# Patient Record
Sex: FEMALE | Race: WHITE | Employment: OTHER | URBAN - METROPOLITAN AREA
[De-identification: names, ages, dates, MRNs, and addresses within clinical notes are randomized per-mention and may not be internally consistent; named-entity substitution may affect disease eponyms.]

---

## 2020-06-23 ENCOUNTER — IP HISTORICAL/CONVERTED ENCOUNTER (OUTPATIENT)
Dept: OTHER | Age: 80
End: 2020-06-23

## 2022-01-29 ENCOUNTER — APPOINTMENT (OUTPATIENT)
Dept: CT IMAGING | Age: 82
DRG: 175 | End: 2022-01-29
Attending: STUDENT IN AN ORGANIZED HEALTH CARE EDUCATION/TRAINING PROGRAM
Payer: MEDICARE

## 2022-01-29 ENCOUNTER — APPOINTMENT (OUTPATIENT)
Dept: NON INVASIVE DIAGNOSTICS | Age: 82
DRG: 175 | End: 2022-01-29
Attending: STUDENT IN AN ORGANIZED HEALTH CARE EDUCATION/TRAINING PROGRAM
Payer: MEDICARE

## 2022-01-29 ENCOUNTER — HOSPITAL ENCOUNTER (INPATIENT)
Age: 82
LOS: 12 days | Discharge: HOME | DRG: 175 | End: 2022-02-10
Attending: STUDENT IN AN ORGANIZED HEALTH CARE EDUCATION/TRAINING PROGRAM | Admitting: INTERNAL MEDICINE
Payer: MEDICARE

## 2022-01-29 ENCOUNTER — APPOINTMENT (OUTPATIENT)
Dept: GENERAL RADIOLOGY | Age: 82
DRG: 175 | End: 2022-01-29
Attending: STUDENT IN AN ORGANIZED HEALTH CARE EDUCATION/TRAINING PROGRAM
Payer: MEDICARE

## 2022-01-29 DIAGNOSIS — R09.02 HYPOXIA: ICD-10-CM

## 2022-01-29 DIAGNOSIS — I26.99 ACUTE PULMONARY EMBOLISM, UNSPECIFIED PULMONARY EMBOLISM TYPE, UNSPECIFIED WHETHER ACUTE COR PULMONALE PRESENT (HCC): Primary | ICD-10-CM

## 2022-01-29 PROBLEM — D64.9 ANEMIA: Status: ACTIVE | Noted: 2022-01-29

## 2022-01-29 PROBLEM — U07.1 COVID-19: Chronic | Status: ACTIVE | Noted: 2022-01-29

## 2022-01-29 PROBLEM — J96.01 ACUTE RESPIRATORY FAILURE WITH HYPOXIA (HCC): Status: ACTIVE | Noted: 2022-01-29

## 2022-01-29 PROBLEM — I48.91 ATRIAL FIBRILLATION (HCC): Status: ACTIVE | Noted: 2022-01-29

## 2022-01-29 PROBLEM — J90 PLEURAL EFFUSION: Status: ACTIVE | Noted: 2022-01-29

## 2022-01-29 PROBLEM — J85.2 LUNG ABSCESS (HCC): Status: ACTIVE | Noted: 2022-01-29

## 2022-01-29 PROBLEM — Q44.1 GALLBLADDER ANOMALY: Status: ACTIVE | Noted: 2022-01-29

## 2022-01-29 PROBLEM — K59.00 CONSTIPATION: Status: ACTIVE | Noted: 2022-01-29

## 2022-01-29 PROBLEM — K25.7 CHRONIC GASTRIC ULCER: Chronic | Status: ACTIVE | Noted: 2022-01-29

## 2022-01-29 PROBLEM — J90 BILATERAL PLEURAL EFFUSION: Status: ACTIVE | Noted: 2022-01-29

## 2022-01-29 PROBLEM — K92.2 GIB (GASTROINTESTINAL BLEEDING): Chronic | Status: ACTIVE | Noted: 2022-01-29

## 2022-01-29 PROBLEM — K44.9 HIATAL HERNIA: Status: ACTIVE | Noted: 2022-01-29

## 2022-01-29 PROBLEM — E87.6 HYPOKALEMIA: Status: ACTIVE | Noted: 2022-01-29

## 2022-01-29 PROBLEM — I95.9 HYPOTENSION: Status: ACTIVE | Noted: 2022-01-29

## 2022-01-29 PROBLEM — I82.409 DEEP VEIN THROMBOSIS (DVT) OF LOWER EXTREMITY (HCC): Status: ACTIVE | Noted: 2022-01-29

## 2022-01-29 LAB
ALBUMIN SERPL-MCNC: 1.6 G/DL (ref 3.5–5)
ALBUMIN/GLOB SERPL: 0.5 {RATIO} (ref 1.1–2.2)
ALP SERPL-CCNC: 105 U/L (ref 45–117)
ALT SERPL-CCNC: 54 U/L (ref 12–78)
ANION GAP SERPL CALC-SCNC: 2 MMOL/L (ref 5–15)
APTT PPP: 32 SEC (ref 21.2–34.1)
AST SERPL W P-5'-P-CCNC: 60 U/L (ref 15–37)
BASOPHILS # BLD: 0 K/UL (ref 0–0.1)
BASOPHILS NFR BLD: 0 % (ref 0–1)
BILIRUB SERPL-MCNC: 1.6 MG/DL (ref 0.2–1)
BNP SERPL-MCNC: 2570 PG/ML
BUN SERPL-MCNC: 26 MG/DL (ref 6–20)
BUN/CREAT SERPL: 49 (ref 12–20)
CA-I BLD-MCNC: 7.6 MG/DL (ref 8.5–10.1)
CHLORIDE SERPL-SCNC: 102 MMOL/L (ref 97–108)
CO2 SERPL-SCNC: 36 MMOL/L (ref 21–32)
COVID-19 RAPID TEST, COVR: NOT DETECTED
CREAT SERPL-MCNC: 0.53 MG/DL (ref 0.55–1.02)
D DIMER PPP FEU-MCNC: 1.91 UG/ML(FEU)
DIFFERENTIAL METHOD BLD: ABNORMAL
ECHO AO ROOT DIAM: 2.1 CM
ECHO AO ROOT INDEX: 1.44 CM/M2
ECHO AV PEAK GRADIENT: 3 MMHG
ECHO AV PEAK VELOCITY: 0.9 M/S
ECHO EST RA PRESSURE: 15 MMHG
ECHO LA DIAMETER INDEX: 2.19 CM/M2
ECHO LA DIAMETER: 3.2 CM
ECHO LA TO AORTIC ROOT RATIO: 1.52
ECHO LV FRACTIONAL SHORTENING: 31 % (ref 28–44)
ECHO LV INTERNAL DIMENSION DIASTOLE INDEX: 2.19 CM/M2
ECHO LV INTERNAL DIMENSION DIASTOLIC: 3.2 CM (ref 3.9–5.3)
ECHO LV INTERNAL DIMENSION SYSTOLIC INDEX: 1.51 CM/M2
ECHO LV INTERNAL DIMENSION SYSTOLIC: 2.2 CM
ECHO LV IVSD: 1.3 CM (ref 0.6–0.9)
ECHO LV MASS 2D: 135.7 G (ref 67–162)
ECHO LV MASS INDEX 2D: 92.9 G/M2 (ref 43–95)
ECHO LV POSTERIOR WALL DIASTOLIC: 1.3 CM (ref 0.6–0.9)
ECHO LV RELATIVE WALL THICKNESS RATIO: 0.81
ECHO MV A VELOCITY: 0.56 M/S
ECHO MV E DECELERATION TIME (DT): 102 MS
ECHO MV E VELOCITY: 0.72 M/S
ECHO MV E/A RATIO: 1.29
ECHO PULMONARY ARTERY SYSTOLIC PRESSURE (PASP): 33 MMHG
ECHO PV MAX VELOCITY: 0.8 M/S
ECHO PV PEAK GRADIENT: 3 MMHG
ECHO RIGHT VENTRICULAR SYSTOLIC PRESSURE (RVSP): 33 MMHG
ECHO RV INTERNAL DIMENSION: 3.1 CM
ECHO TV REGURGITANT MAX VELOCITY: 2.13 M/S
ECHO TV REGURGITANT PEAK GRADIENT: 18 MMHG
EOSINOPHIL # BLD: 0.4 K/UL (ref 0–0.4)
EOSINOPHIL NFR BLD: 9 % (ref 0–7)
ERYTHROCYTE [DISTWIDTH] IN BLOOD BY AUTOMATED COUNT: 19.6 % (ref 11.5–14.5)
GLOBULIN SER CALC-MCNC: 3 G/DL (ref 2–4)
GLUCOSE SERPL-MCNC: 110 MG/DL (ref 65–100)
HCT VFR BLD AUTO: 28.3 % (ref 35–47)
HGB BLD-MCNC: 8.9 G/DL (ref 11.5–16)
IMM GRANULOCYTES # BLD AUTO: 0 K/UL (ref 0–0.04)
IMM GRANULOCYTES NFR BLD AUTO: 1 % (ref 0–0.5)
INR PPP: 1.5 (ref 0.9–1.1)
LACTATE SERPL-SCNC: 1.1 MMOL/L (ref 0.4–2)
LIPASE SERPL-CCNC: 59 U/L (ref 73–393)
LYMPHOCYTES # BLD: 0.7 K/UL (ref 0.8–3.5)
LYMPHOCYTES NFR BLD: 17 % (ref 12–49)
MCH RBC QN AUTO: 31 PG (ref 26–34)
MCHC RBC AUTO-ENTMCNC: 31.4 G/DL (ref 30–36.5)
MCV RBC AUTO: 98.6 FL (ref 80–99)
MONOCYTES # BLD: 0.4 K/UL (ref 0–1)
MONOCYTES NFR BLD: 10 % (ref 5–13)
NEUTS SEG # BLD: 2.5 K/UL (ref 1.8–8)
NEUTS SEG NFR BLD: 63 % (ref 32–75)
NRBC # BLD: 0 K/UL (ref 0–0.01)
NRBC BLD-RTO: 0 PER 100 WBC
PLATELET # BLD AUTO: 161 K/UL (ref 150–400)
PMV BLD AUTO: 10.9 FL (ref 8.9–12.9)
POTASSIUM SERPL-SCNC: 3 MMOL/L (ref 3.5–5.1)
PROT SERPL-MCNC: 4.6 G/DL (ref 6.4–8.2)
PROTHROMBIN TIME: 17.8 SEC (ref 11.9–14.7)
RBC # BLD AUTO: 2.87 M/UL (ref 3.8–5.2)
SODIUM SERPL-SCNC: 140 MMOL/L (ref 136–145)
SPECIMEN SOURCE: NORMAL
THERAPEUTIC RANGE,PTTT: NORMAL SEC (ref 82–109)
TROPONIN-HIGH SENSITIVITY: 18 NG/L (ref 0–51)
WBC # BLD AUTO: 4 K/UL (ref 3.6–11)

## 2022-01-29 PROCEDURE — 83605 ASSAY OF LACTIC ACID: CPT

## 2022-01-29 PROCEDURE — 99285 EMERGENCY DEPT VISIT HI MDM: CPT

## 2022-01-29 PROCEDURE — 74011250637 HC RX REV CODE- 250/637: Performed by: STUDENT IN AN ORGANIZED HEALTH CARE EDUCATION/TRAINING PROGRAM

## 2022-01-29 PROCEDURE — 85730 THROMBOPLASTIN TIME PARTIAL: CPT

## 2022-01-29 PROCEDURE — 74177 CT ABD & PELVIS W/CONTRAST: CPT

## 2022-01-29 PROCEDURE — 83690 ASSAY OF LIPASE: CPT

## 2022-01-29 PROCEDURE — 74011250636 HC RX REV CODE- 250/636: Performed by: INTERNAL MEDICINE

## 2022-01-29 PROCEDURE — 71045 X-RAY EXAM CHEST 1 VIEW: CPT

## 2022-01-29 PROCEDURE — 36415 COLL VENOUS BLD VENIPUNCTURE: CPT

## 2022-01-29 PROCEDURE — 85610 PROTHROMBIN TIME: CPT

## 2022-01-29 PROCEDURE — 83880 ASSAY OF NATRIURETIC PEPTIDE: CPT

## 2022-01-29 PROCEDURE — 93005 ELECTROCARDIOGRAM TRACING: CPT

## 2022-01-29 PROCEDURE — 87040 BLOOD CULTURE FOR BACTERIA: CPT

## 2022-01-29 PROCEDURE — 87635 SARS-COV-2 COVID-19 AMP PRB: CPT

## 2022-01-29 PROCEDURE — 84484 ASSAY OF TROPONIN QUANT: CPT

## 2022-01-29 PROCEDURE — 85025 COMPLETE CBC W/AUTO DIFF WBC: CPT

## 2022-01-29 PROCEDURE — 65270000029 HC RM PRIVATE

## 2022-01-29 PROCEDURE — 74011000636 HC RX REV CODE- 636: Performed by: STUDENT IN AN ORGANIZED HEALTH CARE EDUCATION/TRAINING PROGRAM

## 2022-01-29 PROCEDURE — 96361 HYDRATE IV INFUSION ADD-ON: CPT

## 2022-01-29 PROCEDURE — 74011250636 HC RX REV CODE- 250/636: Performed by: STUDENT IN AN ORGANIZED HEALTH CARE EDUCATION/TRAINING PROGRAM

## 2022-01-29 PROCEDURE — 71275 CT ANGIOGRAPHY CHEST: CPT

## 2022-01-29 PROCEDURE — 96375 TX/PRO/DX INJ NEW DRUG ADDON: CPT

## 2022-01-29 PROCEDURE — 93306 TTE W/DOPPLER COMPLETE: CPT

## 2022-01-29 PROCEDURE — 85379 FIBRIN DEGRADATION QUANT: CPT

## 2022-01-29 PROCEDURE — 80053 COMPREHEN METABOLIC PANEL: CPT

## 2022-01-29 PROCEDURE — 96374 THER/PROPH/DIAG INJ IV PUSH: CPT

## 2022-01-29 PROCEDURE — 74011000250 HC RX REV CODE- 250: Performed by: INTERNAL MEDICINE

## 2022-01-29 PROCEDURE — 74011250637 HC RX REV CODE- 250/637: Performed by: INTERNAL MEDICINE

## 2022-01-29 PROCEDURE — P9045 ALBUMIN (HUMAN), 5%, 250 ML: HCPCS | Performed by: INTERNAL MEDICINE

## 2022-01-29 RX ORDER — SODIUM CHLORIDE 0.9 % (FLUSH) 0.9 %
5-40 SYRINGE (ML) INJECTION AS NEEDED
Status: DISCONTINUED | OUTPATIENT
Start: 2022-01-29 | End: 2022-02-10 | Stop reason: HOSPADM

## 2022-01-29 RX ORDER — POLYETHYLENE GLYCOL 3350 17 G/17G
17 POWDER, FOR SOLUTION ORAL DAILY PRN
Status: DISCONTINUED | OUTPATIENT
Start: 2022-01-29 | End: 2022-02-10 | Stop reason: HOSPADM

## 2022-01-29 RX ORDER — HEPARIN SODIUM 1000 [USP'U]/ML
80 INJECTION, SOLUTION INTRAVENOUS; SUBCUTANEOUS AS NEEDED
Status: DISCONTINUED | OUTPATIENT
Start: 2022-01-29 | End: 2022-01-29

## 2022-01-29 RX ORDER — POLYETHYLENE GLYCOL 3350 17 G/17G
17 POWDER, FOR SOLUTION ORAL DAILY
Status: DISCONTINUED | OUTPATIENT
Start: 2022-01-29 | End: 2022-02-10 | Stop reason: HOSPADM

## 2022-01-29 RX ORDER — ACETAMINOPHEN 325 MG/1
650 TABLET ORAL
Status: DISCONTINUED | OUTPATIENT
Start: 2022-01-29 | End: 2022-02-10 | Stop reason: HOSPADM

## 2022-01-29 RX ORDER — SODIUM CHLORIDE 0.9 % (FLUSH) 0.9 %
5-40 SYRINGE (ML) INJECTION EVERY 8 HOURS
Status: DISCONTINUED | OUTPATIENT
Start: 2022-01-29 | End: 2022-02-10 | Stop reason: HOSPADM

## 2022-01-29 RX ORDER — SODIUM CHLORIDE 9 MG/ML
250 INJECTION, SOLUTION INTRAVENOUS ONCE
Status: COMPLETED | OUTPATIENT
Start: 2022-01-29 | End: 2022-01-29

## 2022-01-29 RX ORDER — HEPARIN SODIUM 1000 [USP'U]/ML
40 INJECTION, SOLUTION INTRAVENOUS; SUBCUTANEOUS AS NEEDED
Status: DISCONTINUED | OUTPATIENT
Start: 2022-01-29 | End: 2022-01-29

## 2022-01-29 RX ORDER — ACETAMINOPHEN 650 MG/1
650 SUPPOSITORY RECTAL
Status: DISCONTINUED | OUTPATIENT
Start: 2022-01-29 | End: 2022-02-10 | Stop reason: HOSPADM

## 2022-01-29 RX ORDER — AMIODARONE HYDROCHLORIDE 200 MG/1
200 TABLET ORAL DAILY
Status: DISCONTINUED | OUTPATIENT
Start: 2022-01-30 | End: 2022-02-10 | Stop reason: HOSPADM

## 2022-01-29 RX ORDER — POTASSIUM CHLORIDE 7.45 MG/ML
10 INJECTION INTRAVENOUS
Status: COMPLETED | OUTPATIENT
Start: 2022-01-29 | End: 2022-01-29

## 2022-01-29 RX ORDER — FUROSEMIDE 10 MG/ML
20 INJECTION INTRAMUSCULAR; INTRAVENOUS ONCE
Status: ACTIVE | OUTPATIENT
Start: 2022-01-29 | End: 2022-01-30

## 2022-01-29 RX ORDER — ONDANSETRON 2 MG/ML
4 INJECTION INTRAMUSCULAR; INTRAVENOUS
Status: DISCONTINUED | OUTPATIENT
Start: 2022-01-29 | End: 2022-02-10 | Stop reason: HOSPADM

## 2022-01-29 RX ORDER — HEPARIN SODIUM 1000 [USP'U]/ML
80 INJECTION, SOLUTION INTRAVENOUS; SUBCUTANEOUS ONCE
Status: DISCONTINUED | OUTPATIENT
Start: 2022-01-29 | End: 2022-01-29

## 2022-01-29 RX ORDER — POTASSIUM CHLORIDE 1.5 G/1.77G
40 POWDER, FOR SOLUTION ORAL
Status: COMPLETED | OUTPATIENT
Start: 2022-01-29 | End: 2022-01-29

## 2022-01-29 RX ORDER — MIDODRINE HYDROCHLORIDE 5 MG/1
5 TABLET ORAL 2 TIMES DAILY WITH MEALS
Status: DISCONTINUED | OUTPATIENT
Start: 2022-01-29 | End: 2022-02-08

## 2022-01-29 RX ORDER — ONDANSETRON 4 MG/1
4 TABLET, ORALLY DISINTEGRATING ORAL
Status: DISCONTINUED | OUTPATIENT
Start: 2022-01-29 | End: 2022-02-10 | Stop reason: HOSPADM

## 2022-01-29 RX ORDER — ALBUMIN HUMAN 50 G/1000ML
25 SOLUTION INTRAVENOUS ONCE
Status: COMPLETED | OUTPATIENT
Start: 2022-01-29 | End: 2022-01-30

## 2022-01-29 RX ORDER — PANTOPRAZOLE SODIUM 40 MG/1
40 TABLET, DELAYED RELEASE ORAL 2 TIMES DAILY
Status: DISCONTINUED | OUTPATIENT
Start: 2022-01-29 | End: 2022-02-10 | Stop reason: HOSPADM

## 2022-01-29 RX ADMIN — PANTOPRAZOLE SODIUM 40 MG: 40 TABLET, DELAYED RELEASE ORAL at 20:37

## 2022-01-29 RX ADMIN — SODIUM CHLORIDE, PRESERVATIVE FREE 10 ML: 5 INJECTION INTRAVENOUS at 22:11

## 2022-01-29 RX ADMIN — MIDODRINE HYDROCHLORIDE 5 MG: 5 TABLET ORAL at 21:07

## 2022-01-29 RX ADMIN — IOPAMIDOL 100 ML: 755 INJECTION, SOLUTION INTRAVENOUS at 14:00

## 2022-01-29 RX ADMIN — SODIUM CHLORIDE 250 ML: 9 INJECTION, SOLUTION INTRAVENOUS at 13:11

## 2022-01-29 RX ADMIN — POLYETHYLENE GLYCOL 3350 17 G: 17 POWDER, FOR SOLUTION ORAL at 16:56

## 2022-01-29 RX ADMIN — POTASSIUM CHLORIDE 10 MEQ: 7.46 INJECTION, SOLUTION INTRAVENOUS at 14:37

## 2022-01-29 RX ADMIN — SODIUM CHLORIDE, PRESERVATIVE FREE 1 G: 5 INJECTION INTRAVENOUS at 17:15

## 2022-01-29 RX ADMIN — LACTULOSE 15 ML: 20 SOLUTION ORAL at 17:17

## 2022-01-29 RX ADMIN — APIXABAN 5 MG: 5 TABLET, FILM COATED ORAL at 20:37

## 2022-01-29 RX ADMIN — SODIUM CHLORIDE 500 ML: 9 INJECTION, SOLUTION INTRAVENOUS at 22:26

## 2022-01-29 RX ADMIN — ALBUMIN (HUMAN) 25 G: 12.5 INJECTION, SOLUTION INTRAVENOUS at 20:35

## 2022-01-29 RX ADMIN — POTASSIUM CHLORIDE 40 MEQ: 1.5 POWDER, FOR SOLUTION ORAL at 20:37

## 2022-01-29 RX ADMIN — POTASSIUM CHLORIDE 10 MEQ: 7.46 INJECTION, SOLUTION INTRAVENOUS at 14:31

## 2022-01-29 NOTE — ED TRIAGE NOTES
Pt from home called EMS for abd pain x5 days, EMS arrives to pt with sat of 88% on RA, with no improvement in saturation per EMS on NC or NRB.  Arrives on CPAP in NAD

## 2022-01-29 NOTE — H&P
History and Physical    Patient: Sanam Miranda MRN: [id number]  SSN: [id number]    YOB: 1940  Age: 79 y.o. Sex: female      Subjective:      Sanam Miranda is a 79 y.o. female who presents to ER with abd pain/constipation. However when EMS found patient she was hypoxic and required O2 and brought in on CPAP; in ER she was continued on NC. Patient is elderly and can give ltd hx. Most hx obtained from daughter Samira Pena) at bedside. Patient has very complex acute and subacute medical course with Covid in December 2021 and most recently hospitalized at [facility] and treated for Lung Abscess, DVT/PE with subsequent GIB (requiring EGD cauterization of gastric ulceration) and then resumption of Eliquis, At Fib, and now at on home IV Merrem 1g q8 with RUE PICC. Has not been active, eating well; no BM for many days. Patient has [facility]. She has received Covid vaccines.       PMH: At Fib, Gastric Ulcer, Covid-19, GIB, Anemia, Lung Abscess, DVT/PE    Past Surgical History:   Procedure Laterality Date    PA EGD DELIVER THERMAL ENERGY SPHNCTR/CARDIA GERD        FH: Reviewed and negative    SH: Lives at home; no tobacco     MEDS: per daughter --> Amiodarone, Eliquis, Atorvastatin, Merrem, Antacid    No Known Allergies    Review of Systems:  Constitutional: No fevers, No chills, No night sweats, + fatigue/weakness, No significant weight change  Eyes: No visual disturbance, No loss of vision  HENT: No nasal congestion, No sore throat, No head lesion, No hearing deficit  Respiratory: + cough, No sputum, No wheezing, + SOB, No hemoptysis, No pleuritic CP  Cardiovascular: No chest pain, No lower extremity edema, No palpitations, No PND, No orthopnea  Gastrointestinal: No nausea, No vomiting, No diarrhea, ++ constipation, + abdominal pain, No Melena, No hematemesis, No BRBPR  Genitourinary: No frequency, No dysuria, No hematuria  Integument/Breast: No rash, No new skin lesion(s), No dryness, No new palpable nodule  Musculoskeletal: No arthralgias, No neck pain, No back pain, No joint pain, No fall or injury  Neurological: No headaches, No dizziness, No confusion,  No seizures, No focal weakness, No LOC, No paresthesia  Heme/Onc: No overt bleeding noted since GIB  Behavioral/Psychiatric: No change in mood; no SI; no hallucination      Objective:     Vitals:    01/29/22 1220 01/29/22 1253 01/29/22 1334 01/29/22 1442   BP: (!) 87/56 (!) 87/61 93/66 95/64   Pulse: 93 93 98 100   Resp: 16 14 16 18   Temp:       SpO2: 97% 98% 98% 98%   Weight:       Height:            Physical Exam:    General: Alert and responsive; elderly/frail  Head: atraumatic  Eye: No overt orbital findings, PERRLA   ENT: No overt hearing loss noted; No nasal lesion; Throat heriberto  Neck: Supple, No overt palpable mass, No significant palpable lymph nodes  Vascular: RUE with PICC  Lung: Coarse breath sounds, right  Heart: S1S2, irreg  Abdomen: Soft, NT, No rigidity, No rebound, Bowel sounds +; flat  Extremities: No edema  M/S: No traumatic change, active mobility noted  Skin: No cyanosis, No rash of significance (other than chronic lesions)  Neurologic: No overt focal motor deficit. Oriented.    Psychiatric: Coherent and age appropriate    Recent Results (from the past 24 hour(s))   COVID-19 RAPID TEST    Collection Time: 01/29/22 12:00 PM   Result Value Ref Range    Specimen source Nasopharyngeal      COVID-19 rapid test Not Detected Not Detected     CBC WITH AUTOMATED DIFF    Collection Time: 01/29/22 12:01 PM   Result Value Ref Range    WBC 4.0 3.6 - 11.0 K/uL    RBC 2.87 (L) 3.80 - 5.20 M/uL    HGB 8.9 (L) 11.5 - 16.0 g/dL    HCT 28.3 (L) 35.0 - 47.0 %    MCV 98.6 80.0 - 99.0 FL    MCH 31.0 26.0 - 34.0 PG    MCHC 31.4 30.0 - 36.5 g/dL    RDW 19.6 (H) 11.5 - 14.5 %    PLATELET 440 107 - 441 K/uL    MPV 10.9 8.9 - 12.9 FL    NRBC 0.0 0.0  WBC    ABSOLUTE NRBC 0.00 0.00 - 0.01 K/uL    NEUTROPHILS 63 32 - 75 %    LYMPHOCYTES 17 12 - 49 % MONOCYTES 10 5 - 13 %    EOSINOPHILS 9 (H) 0 - 7 %    BASOPHILS 0 0 - 1 %    IMMATURE GRANULOCYTES 1 (H) 0 - 0.5 %    ABS. NEUTROPHILS 2.5 1.8 - 8.0 K/UL    ABS. LYMPHOCYTES 0.7 (L) 0.8 - 3.5 K/UL    ABS. MONOCYTES 0.4 0.0 - 1.0 K/UL    ABS. EOSINOPHILS 0.4 0.0 - 0.4 K/UL    ABS. BASOPHILS 0.0 0.0 - 0.1 K/UL    ABS. IMM. GRANS. 0.0 0.00 - 0.04 K/UL    DF AUTOMATED     METABOLIC PANEL, COMPREHENSIVE    Collection Time: 01/29/22 12:01 PM   Result Value Ref Range    Sodium 140 136 - 145 mmol/L    Potassium 3.0 (L) 3.5 - 5.1 mmol/L    Chloride 102 97 - 108 mmol/L    CO2 36 (H) 21 - 32 mmol/L    Anion gap 2 (L) 5 - 15 mmol/L    Glucose 110 (H) 65 - 100 mg/dL    BUN 26 (H) 6 - 20 mg/dL    Creatinine 0.53 (L) 0.55 - 1.02 mg/dL    BUN/Creatinine ratio 49 (H) 12 - 20      GFR est AA >60 >60 ml/min/1.73m2    GFR est non-AA >60 >60 ml/min/1.73m2    Calcium 7.6 (L) 8.5 - 10.1 mg/dL    Bilirubin, total 1.6 (H) 0.2 - 1.0 mg/dL    AST (SGOT) 60 (H) 15 - 37 U/L    ALT (SGPT) 54 12 - 78 U/L    Alk.  phosphatase 105 45 - 117 U/L    Protein, total 4.6 (L) 6.4 - 8.2 g/dL    Albumin 1.6 (L) 3.5 - 5.0 g/dL    Globulin 3.0 2.0 - 4.0 g/dL    A-G Ratio 0.5 (L) 1.1 - 2.2     TROPONIN-HIGH SENSITIVITY    Collection Time: 01/29/22 12:01 PM   Result Value Ref Range    Troponin-High Sensitivity 18 0 - 51 ng/L   NT-PRO BNP    Collection Time: 01/29/22 12:01 PM   Result Value Ref Range    NT pro-BNP 2,570 (H) <450 pg/mL   LIPASE    Collection Time: 01/29/22 12:01 PM   Result Value Ref Range    Lipase 59 (L) 73 - 393 U/L   LACTIC ACID    Collection Time: 01/29/22 12:01 PM   Result Value Ref Range    Lactic acid 1.1 0.4 - 2.0 mmol/L   D DIMER    Collection Time: 01/29/22 12:01 PM   Result Value Ref Range    D DIMER 1.91 (H) <0.50 ug/ml(FEU)   PROTHROMBIN TIME + INR    Collection Time: 01/29/22 12:01 PM   Result Value Ref Range    Prothrombin time 17.8 (H) 11.9 - 14.7 sec    INR 1.5 (H) 0.9 - 1.1     PTT    Collection Time: 01/29/22 12:01 PM Result Value Ref Range    aPTT 32.0 21.2 - 34.1 sec    aPTT, therapeutic range   82 - 109 sec   ECHO ADULT COMPLETE    Collection Time: 01/29/22  3:42 PM   Result Value Ref Range    AV Peak Velocity 0.9 m/s    AV Peak Gradient 3 mmHg    Aortic Root 2.1 cm    IVSd 1.3 (A) 0.6 - 0.9 cm    LVIDd 3.2 (A) 3.9 - 5.3 cm    LVIDs 2.2 cm    LVPWd 1.3 (A) 0.6 - 0.9 cm    LA Diameter 3.2 cm    MV E Wave Deceleration Time 102.0 ms    MV A Velocity 0.56 m/s    MV E Velocity 0.72 m/s    PV Max Velocity 0.8 m/s    PV Peak Gradient 3 mmHg    Est. RA Pressure 15 mmHg    RVIDd 3.1 cm    TR Max Velocity 2.13 m/s    TR Peak Gradient 18 mmHg    Fractional Shortening 2D 31 28 - 44 %    LVIDd Index 2.19 cm/m2    LVIDs Index 1.51 cm/m2    LV RWT Ratio 0.81     LV Mass 2D 135.7 67 - 162 g    LV Mass 2D Index 92.9 43 - 95 g/m2    MV E/A 1.29     LA Size Index 2.19 cm/m2    LA/AO Root Ratio 1.52     Ao Root Index 1.44 cm/m2    RVSP 33 mmHg    PASP 33 mmHg       XR Results (maximum last 3): Results from [place] encounter on 01/29/22    XR CHEST PORT    Narrative  Chest one view. AP upright portable at 1204 dated 1/29/2022. Comparison 6/22/2020. The right arm PICC line tip is in the right atrium. The heart is normal in size. There is tortuosity in the aorta. The lungs are hyperexpanded with new right  hemithorax pleural thickening/pleural fluid. Opacification to the right of the trachea in the superior mediastinum may  reflect a thyroid lesion or tortuosity in brachiocephalic vessels, stable. There is incompletely evaluated thoracic/lumbar spine curvature which is convex  to the left. Impression  Comparison 6/22/2020. Central line position as above. Right hemithorax pleural fluid/pleural thickening. Additional chronic findings  as above. CT Results (maximum last 3):   Results from [place] encounter on 01/29/22    CT ABD PELV W CONT    Narrative  Dose Reduction:  All CT scans at this facility are performed using dose reduction optimization  techniques as appropriate to a performed exam including the following: Automated  exposure control, adjustments of the mA and/or kV according to patient size, or  use of iterative reconstruction technique. IV contrast: 100 cc Isovue 370    TECHNIQUE: Contrast-enhanced images of chest, abdomen, and pelvis. MIP reformats  of thoracic CT angiography. CTA chest: Prominent enlargement of thyroid gland. Asymmetric extension of left  thyroid lobe with multiple calcified nodules to involve the superior and middle  mediastinum, with slight mass effect on the airway at the thoracic inlet and the  mid and upper thoracic esophagus. Calcified tortuous thoracic aorta, without  focal aneurysmal dilatation. Cardiomegaly with dilated left atrium and enlarged  right atrium and right ventricle. Reflux of contrast into dilated IVC and  hepatic veins, consistent with right-sided cardiac dysfunction. Right lower lobe  pulmonary arterial filling defects, consistent with pulmonary emboli, acute  versus subacute. Main pulmonary artery 2.5 cm. Small bilateral pleural  effusions. Hiatus hernia with partially intrathoracic stomach. Thin-walled  cavitary lesion, right middle lobe, possibly abscess or pneumatocele, less  likely neoplasm. Upper lobe/apical predominant emphysema. Diffuse bronchial wall  thickening with endobronchial opacities in the lower lobes bilaterally. No focal  lytic or blastic bone lesion. Degenerative changes of mid thoracic spine. CT abdomen/pelvis: Subcentimeter bilateral hepatic lobe lesions are  statistically likely benign but too small to characterize definitively. Layering  sludge is suspected in the gallbladder. No evidence of radiodense gallbladder  stones. Normal caliber bile duct. Borderline prominence of pancreatic duct. No  focal abnormality is seen involving the pancreas, spleen, or adrenal glands. Subcentimeter renal lesions, not well characterized. No intrinsic abnormality of  the urinary bladder. Hysterectomy. Moderate rectal distention by stool. Mild  edema of perirectal fat. No evidence of intestinal wall thickening or mechanical  obstruction. Appendix not discretely visualized. No definite pneumoperitoneum. Atherosclerotic calcification and tortuosity of the aorta, without aneurysmal  dilatation. Prominent atherosclerotic calcification of iliac and femoral  arteries. No pathologic pelvic or retroperitoneal adenopathy. Degenerative  changes of the lumbar spine with convex leftward lumbar scoliosis. Impression  Positive for pulmonary embolus, right lower lobe, acute versus subacute; the  report communicated to Cheyanne Guajardo, 1451 hours. Cardiomegaly with bilateral  pleural effusions. Atherosclerosis. Hiatus hernia with partially intrathoracic  stomach. Distended gallbladder with sludge. Moderate rectal distention by stool. Degenerative changes of the spine. Convex leftward scoliosis, centered at the  L1-L2 level. CTA CHEST W OR W WO CONT    Narrative  Dose Reduction:  All CT scans at this facility are performed using dose reduction optimization  techniques as appropriate to a performed exam including the following: Automated  exposure control, adjustments of the mA and/or kV according to patient size, or  use of iterative reconstruction technique. IV contrast: 100 cc Isovue 370    TECHNIQUE: Contrast-enhanced images of chest, abdomen, and pelvis. MIP reformats  of thoracic CT angiography. CTA chest: Prominent enlargement of thyroid gland. Asymmetric extension of left  thyroid lobe with multiple calcified nodules to involve the superior and middle  mediastinum, with slight mass effect on the airway at the thoracic inlet and the  mid and upper thoracic esophagus. Calcified tortuous thoracic aorta, without  focal aneurysmal dilatation. Cardiomegaly with dilated left atrium and enlarged  right atrium and right ventricle.  Reflux of contrast into dilated IVC and  hepatic veins, consistent with right-sided cardiac dysfunction. Right lower lobe  pulmonary arterial filling defects, consistent with pulmonary emboli, acute  versus subacute. Main pulmonary artery 2.5 cm. Small bilateral pleural  effusions. Hiatus hernia with partially intrathoracic stomach. Thin-walled  cavitary lesion, right middle lobe, possibly abscess or pneumatocele, less  likely neoplasm. Upper lobe/apical predominant emphysema. Diffuse bronchial wall  thickening with endobronchial opacities in the lower lobes bilaterally. No focal  lytic or blastic bone lesion. Degenerative changes of mid thoracic spine. CT abdomen/pelvis: Subcentimeter bilateral hepatic lobe lesions are  statistically likely benign but too small to characterize definitively. Layering  sludge is suspected in the gallbladder. No evidence of radiodense gallbladder  stones. Normal caliber bile duct. Borderline prominence of pancreatic duct. No  focal abnormality is seen involving the pancreas, spleen, or adrenal glands. Subcentimeter renal lesions, not well characterized. No intrinsic abnormality of  the urinary bladder. Hysterectomy. Moderate rectal distention by stool. Mild  edema of perirectal fat. No evidence of intestinal wall thickening or mechanical  obstruction. Appendix not discretely visualized. No definite pneumoperitoneum. Atherosclerotic calcification and tortuosity of the aorta, without aneurysmal  dilatation. Prominent atherosclerotic calcification of iliac and femoral  arteries. No pathologic pelvic or retroperitoneal adenopathy. Degenerative  changes of the lumbar spine with convex leftward lumbar scoliosis. Impression  Positive for pulmonary embolus, right lower lobe, acute versus subacute; the  report communicated to Sandra Elam, 1451 hours. Cardiomegaly with bilateral  pleural effusions. Atherosclerosis. Hiatus hernia with partially intrathoracic  stomach. Distended gallbladder with sludge.  Moderate rectal distention by stool. Degenerative changes of the spine. Convex leftward scoliosis, centered at the  L1-L2 level. STAT ECHO    Principal Problem:    Acute respiratory failure with hypoxia ([address] (1/29/2022)    Active Problems:    Pulmonary emboli ([address] (1/29/2022)      Lung abscess ([address] (1/29/2022)      Deep vein thrombosis (DVT) of lower extremity (HCC) (1/29/2022)      Atrial fibrillation ([address] (1/29/2022)      Constipation (1/29/2022)      GIB (gastrointestinal bleeding) (1/29/2022)      Overview: Recent      Chronic gastric ulcer (1/29/2022)      Overview: Recent      COVID-19 (1/29/2022)      Overview: Dec 2021      Anemia (1/29/2022)      Hypokalemia (1/29/2022)      Bilateral pleural effusion (1/29/2022)      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 Questionable GB distention with sludge (probably incidental). I discussed with ER Physician and patient's daughter (at bedside in ER, Heriberto Ko) of all findings and mgmt plans.    ---> ADMIT    1) Reconsult her subspecialists: Pulm, ID, Card  2) O2  3) Cont Merrem, Amiodarone  4) Heparin gtt for now as ordered by ER ---> STAT ECHO (as reported shows no RV Strain) --> Resume Eliquis  5) Midodrine;  Albumin/Lasix x1  6) Laxatives  7) Replace K+  8) PT/OT      DVT Prophylaxis: On active AC  Code Status: Full per daughter  POA: Daughter Ryan Hazel)    Critical Care Total Time: 45 mins      Signed By: Michael Del Castillo MD     January 29, 2022

## 2022-01-30 ENCOUNTER — APPOINTMENT (OUTPATIENT)
Dept: GENERAL RADIOLOGY | Age: 82
DRG: 175 | End: 2022-01-30
Attending: INTERNAL MEDICINE
Payer: MEDICARE

## 2022-01-30 LAB
ALBUMIN SERPL-MCNC: 1.7 G/DL (ref 3.5–5)
ALBUMIN/GLOB SERPL: 0.8 {RATIO} (ref 1.1–2.2)
ALP SERPL-CCNC: 82 U/L (ref 45–117)
ALT SERPL-CCNC: 53 U/L (ref 12–78)
ANION GAP SERPL CALC-SCNC: 0 MMOL/L (ref 5–15)
AST SERPL W P-5'-P-CCNC: 59 U/L (ref 15–37)
BASOPHILS # BLD: 0 K/UL (ref 0–0.1)
BASOPHILS NFR BLD: 0 % (ref 0–1)
BILIRUB SERPL-MCNC: 1.7 MG/DL (ref 0.2–1)
BUN SERPL-MCNC: 23 MG/DL (ref 6–20)
BUN/CREAT SERPL: 47 (ref 12–20)
CA-I BLD-MCNC: 7.3 MG/DL (ref 8.5–10.1)
CHLORIDE SERPL-SCNC: 105 MMOL/L (ref 97–108)
CO2 SERPL-SCNC: 36 MMOL/L (ref 21–32)
CREAT SERPL-MCNC: 0.49 MG/DL (ref 0.55–1.02)
DIFFERENTIAL METHOD BLD: ABNORMAL
EOSINOPHIL # BLD: 0.4 K/UL (ref 0–0.4)
EOSINOPHIL NFR BLD: 20 % (ref 0–7)
ERYTHROCYTE [DISTWIDTH] IN BLOOD BY AUTOMATED COUNT: 19.8 % (ref 11.5–14.5)
GLOBULIN SER CALC-MCNC: 2.2 G/DL (ref 2–4)
GLUCOSE SERPL-MCNC: 84 MG/DL (ref 65–100)
HCT VFR BLD AUTO: 23.4 % (ref 35–47)
HCT VFR BLD AUTO: 27.5 % (ref 35–47)
HGB BLD-MCNC: 7.4 G/DL (ref 11.5–16)
HGB BLD-MCNC: 8.7 G/DL (ref 11.5–16)
IMM GRANULOCYTES # BLD AUTO: 0 K/UL (ref 0–0.04)
IMM GRANULOCYTES NFR BLD AUTO: 1 % (ref 0–0.5)
LYMPHOCYTES # BLD: 0.3 K/UL (ref 0.8–3.5)
LYMPHOCYTES NFR BLD: 16 % (ref 12–49)
MCH RBC QN AUTO: 31.2 PG (ref 26–34)
MCHC RBC AUTO-ENTMCNC: 31.6 G/DL (ref 30–36.5)
MCV RBC AUTO: 98.7 FL (ref 80–99)
MONOCYTES # BLD: 0.1 K/UL (ref 0–1)
MONOCYTES NFR BLD: 6 % (ref 5–13)
NEUTS SEG # BLD: 1.2 K/UL (ref 1.8–8)
NEUTS SEG NFR BLD: 57 % (ref 32–75)
NRBC # BLD: 0 K/UL (ref 0–0.01)
NRBC BLD-RTO: 0 PER 100 WBC
PLATELET # BLD AUTO: 147 K/UL (ref 150–400)
PMV BLD AUTO: 10.6 FL (ref 8.9–12.9)
POTASSIUM SERPL-SCNC: 3 MMOL/L (ref 3.5–5.1)
PROT SERPL-MCNC: 3.9 G/DL (ref 6.4–8.2)
RBC # BLD AUTO: 2.37 M/UL (ref 3.8–5.2)
SODIUM SERPL-SCNC: 141 MMOL/L (ref 136–145)
WBC # BLD AUTO: 2.1 K/UL (ref 3.6–11)

## 2022-01-30 PROCEDURE — 74011000250 HC RX REV CODE- 250: Performed by: INTERNAL MEDICINE

## 2022-01-30 PROCEDURE — 74011250636 HC RX REV CODE- 250/636: Performed by: INTERNAL MEDICINE

## 2022-01-30 PROCEDURE — 36415 COLL VENOUS BLD VENIPUNCTURE: CPT

## 2022-01-30 PROCEDURE — 65270000029 HC RM PRIVATE

## 2022-01-30 PROCEDURE — 74011250637 HC RX REV CODE- 250/637: Performed by: INTERNAL MEDICINE

## 2022-01-30 PROCEDURE — 77010033678 HC OXYGEN DAILY

## 2022-01-30 PROCEDURE — 94761 N-INVAS EAR/PLS OXIMETRY MLT: CPT

## 2022-01-30 PROCEDURE — 94760 N-INVAS EAR/PLS OXIMETRY 1: CPT

## 2022-01-30 PROCEDURE — 94640 AIRWAY INHALATION TREATMENT: CPT

## 2022-01-30 PROCEDURE — 74011250637 HC RX REV CODE- 250/637: Performed by: STUDENT IN AN ORGANIZED HEALTH CARE EDUCATION/TRAINING PROGRAM

## 2022-01-30 PROCEDURE — 94640 AIRWAY INHALATION TREATMENT: CPT | Performed by: INTERNAL MEDICINE

## 2022-01-30 PROCEDURE — 71045 X-RAY EXAM CHEST 1 VIEW: CPT

## 2022-01-30 PROCEDURE — 80053 COMPREHEN METABOLIC PANEL: CPT

## 2022-01-30 PROCEDURE — 85018 HEMOGLOBIN: CPT

## 2022-01-30 PROCEDURE — 85025 COMPLETE CBC W/AUTO DIFF WBC: CPT

## 2022-01-30 RX ORDER — IPRATROPIUM BROMIDE AND ALBUTEROL SULFATE 2.5; .5 MG/3ML; MG/3ML
3 SOLUTION RESPIRATORY (INHALATION)
Status: DISCONTINUED | OUTPATIENT
Start: 2022-01-30 | End: 2022-02-10 | Stop reason: HOSPADM

## 2022-01-30 RX ORDER — GUAIFENESIN 600 MG/1
600 TABLET, EXTENDED RELEASE ORAL EVERY 12 HOURS
Status: DISCONTINUED | OUTPATIENT
Start: 2022-01-30 | End: 2022-02-10 | Stop reason: HOSPADM

## 2022-01-30 RX ORDER — IPRATROPIUM BROMIDE AND ALBUTEROL SULFATE 2.5; .5 MG/3ML; MG/3ML
3 SOLUTION RESPIRATORY (INHALATION)
Status: DISCONTINUED | OUTPATIENT
Start: 2022-01-30 | End: 2022-01-30

## 2022-01-30 RX ADMIN — SODIUM CHLORIDE, PRESERVATIVE FREE 1 G: 5 INJECTION INTRAVENOUS at 08:07

## 2022-01-30 RX ADMIN — IPRATROPIUM BROMIDE AND ALBUTEROL SULFATE 3 ML: .5; 2.5 SOLUTION RESPIRATORY (INHALATION) at 20:00

## 2022-01-30 RX ADMIN — APIXABAN 5 MG: 5 TABLET, FILM COATED ORAL at 08:01

## 2022-01-30 RX ADMIN — SODIUM CHLORIDE, PRESERVATIVE FREE 10 ML: 5 INJECTION INTRAVENOUS at 06:55

## 2022-01-30 RX ADMIN — APIXABAN 5 MG: 5 TABLET, FILM COATED ORAL at 22:08

## 2022-01-30 RX ADMIN — PANTOPRAZOLE SODIUM 40 MG: 40 TABLET, DELAYED RELEASE ORAL at 22:08

## 2022-01-30 RX ADMIN — SODIUM CHLORIDE, PRESERVATIVE FREE 1 G: 5 INJECTION INTRAVENOUS at 01:15

## 2022-01-30 RX ADMIN — POLYETHYLENE GLYCOL 3350 17 G: 17 POWDER, FOR SOLUTION ORAL at 08:01

## 2022-01-30 RX ADMIN — MIDODRINE HYDROCHLORIDE 5 MG: 5 TABLET ORAL at 18:48

## 2022-01-30 RX ADMIN — PANTOPRAZOLE SODIUM 40 MG: 40 TABLET, DELAYED RELEASE ORAL at 08:01

## 2022-01-30 RX ADMIN — MIDODRINE HYDROCHLORIDE 5 MG: 5 TABLET ORAL at 07:54

## 2022-01-30 RX ADMIN — GUAIFENESIN 600 MG: 600 TABLET, EXTENDED RELEASE ORAL at 22:08

## 2022-01-30 RX ADMIN — AMIODARONE HYDROCHLORIDE 200 MG: 200 TABLET ORAL at 10:08

## 2022-01-30 RX ADMIN — SODIUM CHLORIDE, PRESERVATIVE FREE 10 ML: 5 INJECTION INTRAVENOUS at 22:09

## 2022-01-30 RX ADMIN — IPRATROPIUM BROMIDE AND ALBUTEROL SULFATE 3 ML: .5; 3 SOLUTION RESPIRATORY (INHALATION) at 12:00

## 2022-01-30 RX ADMIN — SODIUM CHLORIDE, PRESERVATIVE FREE 10 ML: 5 INJECTION INTRAVENOUS at 14:00

## 2022-01-30 RX ADMIN — SODIUM CHLORIDE, PRESERVATIVE FREE 1 G: 5 INJECTION INTRAVENOUS at 18:16

## 2022-01-30 NOTE — PROGRESS NOTES
Hospitalist Progress Note    Subjective:   Daily Progress Note: 1/30/2022 3:36 PM    On Admission:     Diana Gayle is a 79 y.o. female who presents to ER with abd pain/constipation. However when EMS found patient she was hypoxic and required O2 and brought in on CPAP; in ER she was continued on NC. Patient is elderly and can give ltd hx. Most hx obtained from daughter Governor Giovani) at bedside. Patient has very complex acute and subacute medical course with Covid in December 2021 and most recently hospitalized at [facility] and treated for Lung Abscess, DVT/PE with subsequent GIB (requiring EGD cauterization of duodenal bulb ulceration) and then resumption of Eliquis, At [facility], and now at on home IV Merrem 1g q8 with [facility] PICC. Has not been active, eating well; no BM for many days. Patient has [facility]. She has received Covid vaccines.        Subjective: Feels better. Still no BM. But trying to eat. Son at bedside. No active CP. + Cough/congestion. No active N/V.         Current Facility-Administered Medications   Medication Dose Route Frequency    albuterol-ipratropium (DUO-NEB) 2.5 MG-0.5 MG/3 ML  3 mL Nebulization Q6HWA    guaiFENesin ER (MUCINEX) tablet 600 mg  600 mg Oral Q12H    polyethylene glycol (MIRALAX) packet 17 g  17 g Oral DAILY    meropenem (MERREM) 1 g in 0.9% sodium chloride 20 mL IV syringe  1 g IntraVENous Q8H    amiodarone (CORDARONE) tablet 200 mg  200 mg Oral DAILY    pantoprazole (PROTONIX) tablet 40 mg  40 mg Oral BID    midodrine (PROAMATINE) tablet 5 mg  5 mg Oral BID WITH MEALS    apixaban (ELIQUIS) tablet 5 mg  5 mg Oral BID    sodium chloride (NS) flush 5-40 mL  5-40 mL IntraVENous Q8H    sodium chloride (NS) flush 5-40 mL  5-40 mL IntraVENous PRN    acetaminophen (TYLENOL) tablet 650 mg  650 mg Oral Q6H PRN    Or    acetaminophen (TYLENOL) suppository 650 mg  650 mg Rectal Q6H PRN    polyethylene glycol (MIRALAX) packet 17 g  17 g Oral DAILY PRN    ondansetron (ZOFRAN ODT) tablet 4 mg  4 mg Oral Q8H PRN    Or    ondansetron (ZOFRAN) injection 4 mg  4 mg IntraVENous Q6H PRN     No current outpatient medications on file. Review of Systems    Constitutional: No - fever, chills    HEENT: No - sore throat, sinus pressure, ear pain    Respiratory: No - hemoptysis    Cardiovascular: No - chest pain, palpitations, extremity edema    Gastrointestinal: No - n/v/d/bleeding    Genitourinary: No - dysuria, hematuria    Neurological: No - headache, focal weakness    Skin: No - new rash    M/S: No - joint pain, back pain      Objective:     Visit Vitals  /63   Pulse 100   Temp 98.1 °F (36.7 °C)   Resp 16   Ht 5' 4\" (1.626 m)   Wt 45.4 kg (100 lb)   SpO2 100%   BMI 17.16 kg/m²    O2 Flow Rate (L/min): 3 l/min O2 Device: Nasal cannula    No data recorded. No intake/output data recorded. 01/28 1901 - 01/30 0700  In: 1000 [I.V.:1000]  Out: -       PHYSICAL EXAM    Constitutional: NAD, Awake; much more alert    HEENT: No lesions, rash    Neck: Supple     Cardiovascular: S1S2, irreg    Respiratory:  Coarse right post/lat     GI: Soft, NT; no rigidity; + bowel sounds    : voiding, clear yellow urine    Musculoskeletal: Moving all extremities spontaneously; no overt injury    Neurological:  AxOx2; No new focal weakness; No tremors    Psychiatric: Mood appears appropriate     Skin: No new rash    Vascular: Palpable pulses;  No edema      Data Review    Recent Results (from the past 24 hour(s))   ECHO ADULT COMPLETE    Collection Time: 01/29/22  3:42 PM   Result Value Ref Range    AV Peak Velocity 0.9 m/s    AV Peak Gradient 3 mmHg    Aortic Root 2.1 cm    IVSd 1.3 (A) 0.6 - 0.9 cm    LVIDd 3.2 (A) 3.9 - 5.3 cm    LVIDs 2.2 cm    LVPWd 1.3 (A) 0.6 - 0.9 cm    LA Diameter 3.2 cm    MV E Wave Deceleration Time 102.0 ms    MV A Velocity 0.56 m/s    MV E Velocity 0.72 m/s    PV Max Velocity 0.8 m/s    PV Peak Gradient 3 mmHg    Est. RA Pressure 15 mmHg    RVIDd 3.1 cm    TR Max Velocity 2.13 m/s    TR Peak Gradient 18 mmHg    Fractional Shortening 2D 31 28 - 44 %    LVIDd Index 2.19 cm/m2    LVIDs Index 1.51 cm/m2    LV RWT Ratio 0.81     LV Mass 2D 135.7 67 - 162 g    LV Mass 2D Index 92.9 43 - 95 g/m2    MV E/A 1.29     LA Size Index 2.19 cm/m2    LA/AO Root Ratio 1.52     Ao Root Index 1.44 cm/m2    RVSP 33 mmHg    PASP 33 mmHg   CBC WITH AUTOMATED DIFF    Collection Time: 01/30/22  5:09 AM   Result Value Ref Range    WBC 2.1 (L) 3.6 - 11.0 K/uL    RBC 2.37 (L) 3.80 - 5.20 M/uL    HGB 7.4 (L) 11.5 - 16.0 g/dL    HCT 23.4 (L) 35.0 - 47.0 %    MCV 98.7 80.0 - 99.0 FL    MCH 31.2 26.0 - 34.0 PG    MCHC 31.6 30.0 - 36.5 g/dL    RDW 19.8 (H) 11.5 - 14.5 %    PLATELET 162 (L) 289 - 400 K/uL    MPV 10.6 8.9 - 12.9 FL    NRBC 0.0 0.0  WBC    ABSOLUTE NRBC 0.00 0.00 - 0.01 K/uL    NEUTROPHILS 57 32 - 75 %    LYMPHOCYTES 16 12 - 49 %    MONOCYTES 6 5 - 13 %    EOSINOPHILS 20 (H) 0 - 7 %    BASOPHILS 0 0 - 1 %    IMMATURE GRANULOCYTES 1 (H) 0 - 0.5 %    ABS. NEUTROPHILS 1.2 (L) 1.8 - 8.0 K/UL    ABS. LYMPHOCYTES 0.3 (L) 0.8 - 3.5 K/UL    ABS. MONOCYTES 0.1 0.0 - 1.0 K/UL    ABS. EOSINOPHILS 0.4 0.0 - 0.4 K/UL    ABS. BASOPHILS 0.0 0.0 - 0.1 K/UL    ABS. IMM. GRANS. 0.0 0.00 - 0.04 K/UL    DF AUTOMATED     METABOLIC PANEL, COMPREHENSIVE    Collection Time: 01/30/22  5:09 AM   Result Value Ref Range    Sodium 141 136 - 145 mmol/L    Potassium 3.0 (L) 3.5 - 5.1 mmol/L    Chloride 105 97 - 108 mmol/L    CO2 36 (H) 21 - 32 mmol/L    Anion gap 0 (L) 5 - 15 mmol/L    Glucose 84 65 - 100 mg/dL    BUN 23 (H) 6 - 20 mg/dL    Creatinine 0.49 (L) 0.55 - 1.02 mg/dL    BUN/Creatinine ratio 47 (H) 12 - 20      GFR est AA >60 >60 ml/min/1.73m2    GFR est non-AA >60 >60 ml/min/1.73m2    Calcium 7.3 (L) 8.5 - 10.1 mg/dL    Bilirubin, total 1.7 (H) 0.2 - 1.0 mg/dL    AST (SGOT) 59 (H) 15 - 37 U/L    ALT (SGPT) 53 12 - 78 U/L    Alk.  phosphatase 82 45 - 117 U/L    Protein, total 3.9 (L) 6.4 - 8.2 g/dL Albumin 1.7 (L) 3.5 - 5.0 g/dL    Globulin 2.2 2.0 - 4.0 g/dL    A-G Ratio 0.8 (L) 1.1 - 2.2         XR CHEST PORT   Final Result   Comparison 1/29/2022. Interval development of mild pulmonary edema. CTA CHEST W OR W WO CONT   Final Result   Positive for pulmonary embolus, right lower lobe, acute versus subacute; the   report communicated to Gary Mormon, 1451 hours. Cardiomegaly with bilateral   pleural effusions. Atherosclerosis. Hiatus hernia with partially intrathoracic   stomach. Distended gallbladder with sludge. Moderate rectal distention by stool. Degenerative changes of the spine. Convex leftward scoliosis, centered at the   L1-L2 level. CT ABD PELV W CONT   Final Result   Positive for pulmonary embolus, right lower lobe, acute versus subacute; the   report communicated to Gary Mormon, 1451 hours. Cardiomegaly with bilateral   pleural effusions. Atherosclerosis. Hiatus hernia with partially intrathoracic   stomach. Distended gallbladder with sludge. Moderate rectal distention by stool. Degenerative changes of the spine. Convex leftward scoliosis, centered at the   L1-L2 level. XR CHEST PORT   Final Result   Comparison 6/22/2020. Central line position as above. Right hemithorax pleural fluid/pleural thickening. Additional chronic findings   as above. ECHO 1/29/2022  Left Ventricle: Left ventricle size is normal. Mildly increased wall thickness. Normal wall motion. Normal left ventricular systolic function with a visually estimated EF of 65 - 70%.   Mitral Valve: Mild mitral annular calcification.   Right Atrium: Right atrium is mildly dilated.   Pericardium: Small (<1 cm) pericardial effusion present. No indication of cardiac tamponade.   Technical qualifiers: Echo study was technically difficult due to low parasternal window.     PA systolic pressure is 91YNF of Hg        Principal Problem:    Acute respiratory failure with hypoxia ([address] (1/29/2022)    Active Problems:    Pulmonary emboli ([facility] (1/29/2022)      Lung abscess ([facility] (1/29/2022)      Deep vein thrombosis (DVT) of lower extremity ([facility]) (1/29/2022)      Atrial fibrillation ([address] (1/29/2022)      Constipation (1/29/2022)      GIB (gastrointestinal bleeding) (1/29/2022)      Overview: Recent      Chronic gastric ulcer (1/29/2022)      Overview: Recent      COVID-19 (1/29/2022)      Overview: Dec 2021      Anemia (1/29/2022)      Hypokalemia (1/29/2022)      Bilateral pleural effusion (1/29/2022)      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 O2 better. Continue current measures. Evaluated by ID, Pulm and Card.        DVT Prophylaxis: On AC  Code Status: Full Code  POA: Daughter Austen Jalloh)    Care Plan discussed with: son and Dr. Arpita Malone  _______________________________________________________________    Aroldo Nicole MD

## 2022-01-30 NOTE — ED NOTES
TRANSFER - OUT REPORT:    Verbal report given to 4w RN (name) on Dwight Quiles  being transferred to 4w(unit) for routine progression of care       Report consisted of patients Situation, Background, Assessment and   Recommendations(SBAR). Information from the following report(s) SBAR and MAR was reviewed with the receiving nurse. Lines:   PICC Single Lumen  Right (Active)       PICC Single Lumen Right;Basilic (Active)   Central Line Being Utilized Yes 01/29/22 2255   Criteria for Appropriate Use Long term IV/antibiotic administration 01/29/22 2255   Site Assessment Clean, dry, & intact 01/29/22 2255   Phlebitis Assessment 0 01/29/22 2255   Dressing Status Clean, dry, & intact 01/29/22 2255   Dressing Type 4 X 4 01/29/22 2255   Hub Color/Line Status Blue 01/29/22 2255       Peripheral IV 01/29/22 Right Antecubital (Active)   Site Assessment Clean, dry, & intact 01/29/22 2253   Phlebitis Assessment 0 01/29/22 2253   Dressing Status Clean, dry, & intact 01/29/22 2253   Dressing Type 4 X 4 01/29/22 2253   Hub Color/Line Status Blue 01/29/22 2253        Opportunity for questions and clarification was provided.       Patient transported with:   Monitor  Registered Nurse

## 2022-01-30 NOTE — CONSULTS
Full consultation dictated. [id number]  Patient has subacute pulmonary emboli. Continue with Eliquis. History of pulmonary abscess and was getting IV meropenem at home via right upper extremity PICC line. Continue with meropenem. ID has been consulted. History of COVID-19 pneumonia in December 2021. History of atrial fibrillation but also history of gastric ulcer/upper GI bleed requiring EGD and cauterization at [facility]. Continue with Protonix. She has small to moderate bilateral pleural effusions due to malnutrition. Albumin is very low. Furthermore I suspect that she may be aspirating. We will consult speech therapist.  She is frail, emaciated and appears to have lost weight. Thank you.

## 2022-01-30 NOTE — ED PROVIDER NOTES
EMERGENCY DEPARTMENT HISTORY AND PHYSICAL EXAM      Date: 1/29/2022  Patient Name: Vitaliy Gómez    History of Presenting Illness     Chief Complaint   Patient presents with    Abdominal Pain       HPI: Vitaliy Gómez, 79 y.o. female with a past medical history of lung abscess/empyema, DVT/PE, GI bleed with EGD characterization, A. fib on Eliquis, and home meropenem. She is presenting today for abdominal pain however she was found to be hypoxic and brought in with CPAP. She denies any chest pain. No lightheadedness, dizziness, or syncope. No worsening confusion.       PCP: Keron Elam DO    Current Facility-Administered Medications   Medication Dose Route Frequency Provider Last Rate Last Admin    guaiFENesin ER (MUCINEX) tablet 600 mg  600 mg Oral Q12H Wendy Daniels MD        albuterol-ipratropium (DUO-NEB) 2.5 MG-0.5 MG/3 ML  3 mL Nebulization Q6HWA RT Michael Alaniz DO   3 mL at 01/30/22 2000    polyethylene glycol (MIRALAX) packet 17 g  17 g Oral DAILY Reggie Loya MD   17 g at 01/30/22 0801    meropenem (MERREM) 1 g in 0.9% sodium chloride 20 mL IV syringe  1 g IntraVENous Q8H Monse Watts MD   1 g at 01/30/22 1816    amiodarone (CORDARONE) tablet 200 mg  200 mg Oral DAILY Monse Watts MD   200 mg at 01/30/22 1008    pantoprazole (PROTONIX) tablet 40 mg  40 mg Oral BID Monse Watts MD   40 mg at 01/30/22 0801    midodrine (PROAMATINE) tablet 5 mg  5 mg Oral BID WITH MEALS Monse Watts MD   5 mg at 01/30/22 1848    apixaban (ELIQUIS) tablet 5 mg  5 mg Oral BID Monse Watts MD   5 mg at 01/30/22 0801    sodium chloride (NS) flush 5-40 mL  5-40 mL IntraVENous Q8H Monse Watts MD   10 mL at 01/30/22 1400    sodium chloride (NS) flush 5-40 mL  5-40 mL IntraVENous PRN Monse Watts MD        acetaminophen (TYLENOL) tablet 650 mg  650 mg Oral Q6H PRN Monse Watts MD        Or    acetaminophen (TYLENOL) suppository 650 mg  650 mg Rectal Q6H PRN Monse Watts MD       [facility] polyethylene glycol (MIRALAX) packet 17 g  17 g Oral DAILY PRN Mesfin Lovett MD        ondansetron (ZOFRAN ODT) tablet 4 mg  4 mg Oral Q8H PRN Mesfin Lovett MD        Or    [facility] injection 4 mg  4 mg IntraVENous Q6H PRN Mesfin Lovett MD           Medical History   I reviewed the medical, surgical, family, and social history, as well as allergies:    Past Medical History:  History reviewed. No pertinent past medical history. Past Surgical History:  Past Surgical History:   Procedure Laterality Date    FL EGD DELIVER THERMAL ENERGY SPHNCTR/CARDIA GERD         Family History:  Family History   Family history unknown: Yes       Social History:  Social History     Tobacco Use    Smoking status: Not on file    Smokeless tobacco: Not on file   Substance Use Topics    Alcohol use: Not on file    Drug use: Not on file       Allergies: Allergies   Allergen Reactions    Penicillins Other (comments)       Review of Systems     Review of Systems  All other systems negative. Physical Exam and Vital Signs   Vital Signs - Reviewed the patient's vital signs. Patient Vitals for the past 12 hrs:   Pulse Resp BP SpO2   01/30/22 1948    95 %   01/30/22 1759    98 %   01/30/22 1317    100 %   01/30/22 1006 100 16 103/63 99 %   01/30/22 0925 94 15 90/60 98 %       Physical Exam:    GENERAL: awake, alert, cooperative, not in distress  HEENT:  * Pupils equal, EOMI  * Head atraumatic  CV:  * regular rhythm  * warm and perfused extremities bilaterally  PULMONARY: Good air movement, no wheezes or crackles  ABDOMEN: soft, not distended, no guarding, not tenderness to palpation  : No suprapubic tenderness  EXTREMITIES/BACK: warm and perfused, no tenderness, no edema  SKIN: no rashes or signs of trauma  NEURO:  * Speech clear  * Moves U&LE to command      Medical Decision Making and ED Course   - I am the first and primary provider for this patient and am the primary provider of record.   - I reviewed the vital signs, available nursing notes, past medical history, past surgical history, family history and social history. - Initial assessment performed. The patients presenting problems have been discussed, and the staff are in agreement with the care plan formulated and outlined with them. I have encouraged them to ask questions as they arise throughout their visit. - Available medical records, nursing notes, old EKGs, and EMS run sheets (if patient was EMS transported) were reviewed    MDM:   Patient is a 79 y.o. female presenting for AP. Vitals reveal hypoxia and physical exam reveals no abnormalities. Based on the history, physical exam, risk factors, and vitals signs, differential includes: S1 CHF component, compression, PE, colitis, enteritis, hepatitis, pancreatitis, ALEJANDRO, electrolyte abnormalities. Results     Labs:  No results found for this or any previous visit (from the past 12 hour(s)). Radiologic Studies:  CT Results  (Last 48 hours)               01/29/22 1359  CTA CHEST W OR W WO CONT Final result    Impression:  Positive for pulmonary embolus, right lower lobe, acute versus subacute; the   report communicated to [facility], 1451 hours. Cardiomegaly with bilateral   pleural effusions. Atherosclerosis. Hiatus hernia with partially intrathoracic   stomach. Distended gallbladder with sludge. Moderate rectal distention by stool. Degenerative changes of the spine. Convex leftward scoliosis, centered at the   L1-L2 level. Narrative:  Dose Reduction:    All CT scans at this facility are performed using dose reduction optimization   techniques as appropriate to a performed exam including the following: Automated   exposure control, adjustments of the mA and/or kV according to patient size, or   use of iterative reconstruction technique. IV contrast: 100 cc Isovue 370       TECHNIQUE: Contrast-enhanced images of chest, abdomen, and pelvis.  MIP reformats   of thoracic CT angiography. CTA chest: Prominent enlargement of thyroid gland. Asymmetric extension of left   thyroid lobe with multiple calcified nodules to involve the superior and middle   mediastinum, with slight mass effect on the airway at the thoracic inlet and the   mid and upper thoracic esophagus. Calcified tortuous thoracic aorta, without   focal aneurysmal dilatation. Cardiomegaly with dilated left atrium and enlarged   right atrium and right ventricle. Reflux of contrast into dilated IVC and   hepatic veins, consistent with right-sided cardiac dysfunction. Right lower lobe   pulmonary arterial filling defects, consistent with pulmonary emboli, acute   versus subacute. Main pulmonary artery 2.5 cm. Small bilateral pleural   effusions. Hiatus hernia with partially intrathoracic stomach. Thin-walled   cavitary lesion, right middle lobe, possibly abscess or pneumatocele, less   likely neoplasm. Upper lobe/apical predominant emphysema. Diffuse bronchial wall   thickening with endobronchial opacities in the lower lobes bilaterally. No focal   lytic or blastic bone lesion. Degenerative changes of mid thoracic spine. CT abdomen/pelvis: Subcentimeter bilateral hepatic lobe lesions are   statistically likely benign but too small to characterize definitively. Layering   sludge is suspected in the gallbladder. No evidence of radiodense gallbladder   stones. Normal caliber bile duct. Borderline prominence of pancreatic duct. No   focal abnormality is seen involving the pancreas, spleen, or adrenal glands. Subcentimeter renal lesions, not well characterized. No intrinsic abnormality of   the urinary bladder. Hysterectomy. Moderate rectal distention by stool. Mild   edema of perirectal fat. No evidence of intestinal wall thickening or mechanical   obstruction. Appendix not discretely visualized. No definite pneumoperitoneum. Atherosclerotic calcification and tortuosity of the aorta, without aneurysmal   dilatation. Prominent atherosclerotic calcification of iliac and femoral   arteries. No pathologic pelvic or retroperitoneal adenopathy. Degenerative   changes of the lumbar spine with convex leftward lumbar scoliosis. 01/29/22 1359  CT ABD PELV W CONT Final result    Impression:  Positive for pulmonary embolus, right lower lobe, acute versus subacute; the   report communicated to [facility], 1451 hours. Cardiomegaly with bilateral   pleural effusions. Atherosclerosis. Hiatus hernia with partially intrathoracic   stomach. Distended gallbladder with sludge. Moderate rectal distention by stool. Degenerative changes of the spine. Convex leftward scoliosis, centered at the   L1-L2 level. Narrative:  Dose Reduction:    All CT scans at this facility are performed using dose reduction optimization   techniques as appropriate to a performed exam including the following: Automated   exposure control, adjustments of the mA and/or kV according to patient size, or   use of iterative reconstruction technique. IV contrast: 100 cc Isovue 370       TECHNIQUE: Contrast-enhanced images of chest, abdomen, and pelvis. MIP reformats   of thoracic CT angiography. CTA chest: Prominent enlargement of thyroid gland. Asymmetric extension of left   thyroid lobe with multiple calcified nodules to involve the superior and middle   mediastinum, with slight mass effect on the airway at the thoracic inlet and the   mid and upper thoracic esophagus. Calcified tortuous thoracic aorta, without   focal aneurysmal dilatation. Cardiomegaly with dilated left atrium and enlarged   right atrium and right ventricle. Reflux of contrast into dilated IVC and   hepatic veins, consistent with right-sided cardiac dysfunction. Right lower lobe   pulmonary arterial filling defects, consistent with pulmonary emboli, acute   versus subacute. Main pulmonary artery 2.5 cm. Small bilateral pleural   effusions.  Hiatus hernia with partially intrathoracic stomach. Thin-walled   cavitary lesion, right middle lobe, possibly abscess or pneumatocele, less   likely neoplasm. Upper lobe/apical predominant emphysema. Diffuse bronchial wall   thickening with endobronchial opacities in the lower lobes bilaterally. No focal   lytic or blastic bone lesion. Degenerative changes of mid thoracic spine. CT abdomen/pelvis: Subcentimeter bilateral hepatic lobe lesions are   statistically likely benign but too small to characterize definitively. Layering   sludge is suspected in the gallbladder. No evidence of radiodense gallbladder   stones. Normal caliber bile duct. Borderline prominence of pancreatic duct. No   focal abnormality is seen involving the pancreas, spleen, or adrenal glands. Subcentimeter renal lesions, not well characterized. No intrinsic abnormality of   the urinary bladder. Hysterectomy. Moderate rectal distention by stool. Mild   edema of perirectal fat. No evidence of intestinal wall thickening or mechanical   obstruction. Appendix not discretely visualized. No definite pneumoperitoneum. Atherosclerotic calcification and tortuosity of the aorta, without aneurysmal   dilatation. Prominent atherosclerotic calcification of iliac and femoral   arteries. No pathologic pelvic or retroperitoneal adenopathy. Degenerative   changes of the lumbar spine with convex leftward lumbar scoliosis. CXR Results  (Last 48 hours)               01/30/22 0926  XR CHEST PORT Final result    Impression:  Comparison 1/29/2022. Interval development of mild pulmonary edema. Narrative:  Chest one view. AP semiupright portable at 0925 dated 1/30/2022. Comparison 1/29/2022. A right arm PICC line remains in place. The heart is enlarged with calcified   plaque and tortuosity in the aorta. Right pleural fluid/pleural thickening is redemonstrated.        There has been interval development of mild interstitial edema in perihilar and lower lobe distributions. There is no lobar consolidation or pneumothorax. 01/29/22 1204  XR CHEST PORT Final result    Impression:  Comparison 6/22/2020. Central line position as above. Right hemithorax pleural fluid/pleural thickening. Additional chronic findings   as above. Narrative:  Chest one view. AP upright portable at 1204 dated 1/29/2022. Comparison 6/22/2020. The right arm PICC line tip is in the right atrium. The heart is normal in size. There is tortuosity in the aorta. The lungs are hyperexpanded with new right   hemithorax pleural thickening/pleural fluid. Opacification to the right of the trachea in the superior mediastinum may   reflect a thyroid lesion or tortuosity in brachiocephalic vessels, stable. There is incompletely evaluated thoracic/lumbar spine curvature which is convex   to the left.                  Medications ordered:  Medications   polyethylene glycol (MIRALAX) packet 17 g (17 g Oral Given 1/30/22 0801)   meropenem (MERREM) 1 g in 0.9% sodium chloride 20 mL IV syringe (1 g IntraVENous Given 1/30/22 1816)   amiodarone (CORDARONE) tablet 200 mg (200 mg Oral Given 1/30/22 1008)   pantoprazole (PROTONIX) tablet 40 mg (40 mg Oral Given 1/30/22 0801)   furosemide (LASIX) injection 20 mg ( IntraVENous Canceled Entry 1/30/22 0800)   midodrine (PROAMATINE) tablet 5 mg (5 mg Oral Given 1/30/22 1848)   apixaban (ELIQUIS) tablet 5 mg (5 mg Oral Given 1/30/22 0801)   sodium chloride (NS) flush 5-40 mL (10 mL IntraVENous Given 1/30/22 1400)   sodium chloride (NS) flush 5-40 mL (has no administration in time range)   acetaminophen (TYLENOL) tablet 650 mg (has no administration in time range)     Or   acetaminophen (TYLENOL) suppository 650 mg (has no administration in time range)   polyethylene glycol (MIRALAX) packet 17 g (has no administration in time range)   ondansetron (ZOFRAN ODT) tablet 4 mg (has no administration in time range)     Or   ondansetron (ZOFRAN) injection 4 mg (has no administration in time range)   guaiFENesin ER (MUCINEX) tablet 600 mg (0 mg Oral Held 1/30/22 1022)   albuterol-ipratropium (DUO-NEB) 2.5 MG-0.5 MG/3 ML (3 mL Nebulization Given 1/30/22 2000)   0.9% sodium chloride infusion 250 mL (0 mL IntraVENous IV Completed 1/29/22 1337)   potassium chloride 10 mEq in 100 ml IVPB (0 mEq IntraVENous IV Completed 1/29/22 1657)   iopamidoL (ISOVUE-370) 76 % injection 100 mL (100 mL IntraVENous Given 1/29/22 1400)   potassium chloride (KLOR-CON) packet for solution 40 mEq (40 mEq Oral Given 1/29/22 2037)   lactulose (CHRONULAC) 10 gram/15 mL solution 15 mL (15 mL Oral Given 1/29/22 1717)   albumin human 5% (BUMINATE) solution 25 g (0 g IntraVENous IV Completed 1/30/22 0112)   sodium chloride 0.9 % bolus infusion 500 mL (0 mL IntraVENous IV Completed 1/29/22 2326)        ED Course     ED Course:     ED Course as of 01/30/22 2009   Sat Jan 29, 2022   1245 CBC shows a white count of 4 and a hemoglobin of 8.9, unknown baselines. [SS]   1328 BNP elevated, CXRR shows pleural effusion. Hypokalemia noted. Trop 18, will trend. DD elevated will do CTPE. Lactate is within normal limits. No concern for severe sepsis, septic shock, or ischemia. Lipase is not significantly elevated. [SS]   239 [id number] testing is negative.   [SS]   [phone] showed PE with concern for RHS. Will obtain stat echo and cards consult. Will add high intensity heparin. [SS]      ED Course User Index  [SS] Hero Kwok MD       Reassessment / Disposition / Discussion:    Will admit    Final Disposition     Disposition: Condition stable  Admitted to Floor Medical Floor the case was discussed with the admitting physician     ADMISSION: After completion of ED workup and discussion of results and diagnoses with the patient, patient was admitted to the hospital. All the patient's questions were answered.  Case was discussed with the receiving team.        Diagnosis     Clinical Impression:   1. Acute pulmonary embolism, unspecified pulmonary embolism type, unspecified whether acute cor pulmonale present ([facility] [address]    2. Hypoxia        Attestations:    Winnie Garcia MD    Please note that this dictation was completed with [employer], the computer voice recognition software. Quite often unanticipated grammatical, syntax, homophones, and other interpretive errors are inadvertently transcribed by the computer software. Please disregard these errors. Please excuse any errors that have escaped final proofreading. Thank you.

## 2022-01-30 NOTE — ED NOTES
Spoke with Dr. Ciarra Hunter in regards to the patient's blood pressure. States to have the patient with a blood pressure map between 60-65. Will notify Dr. Ciarra Hunter if patient's blood pressure falls out of the goal limits of 60-65 to have further interventions.

## 2022-01-30 NOTE — PROGRESS NOTES
SLP attempted bedside swallow evaluation. However, patient was in pain and not appropriate at this time. Respiratory therapist, Nurse, and patient's daughter was present. SLP will attempt tomorrow.

## 2022-01-30 NOTE — CONSULTS
Consult Date: 1/30/2022    IP CONSULT TO INFECTIOUS DISEASES  Consult performed by: Rose Mary Mullins MD  Consult ordered by: Temo Rueda MD      4K with past medical history of hypertension hyperlipidemia CVA. Reportedly positive for coronavirus the first week of december 2021. 1/2/22 presents to [facility] with altered mental status. During the ED course was found to have an infiltrate on chest imaging, and started on broad spectrum antibiotics. Admitted to hospital and improved during the hospital course with meropenem. Hospital course further complicated by pulmonary embolism. 1/6/22 discharged from hospital on oral anticoagulation. 1/7/22 returns to hospital for acute hypoxic respiratory failure. CT reveals new development of Cavitary lesion of the inferior lateral right middle lobe. Admitted to hospital and I have been asked to see her in consultation. 1/10/22 underwent EGD with Dr Sudeep Granger  Impression:   Large duodenal bulb ulcer - injected and cauterized  Hiatal hernia  Recommendations:   PPI continuous infusion  NPO, IVF    For the cavitary pneumonia I had her on empiric meropenem with an anticipated stop date of 2/4/22.  1/24/22 discharged from [facility]    1/29/22 presents to [facility] because of lower abdominal pains. Reports constipation for the past few days. No improvement with enemas at home. Admitted to hospital for stabilization and further workup of her condition. Subjective     History reviewed. No pertinent past medical history.    Past Surgical History:   Procedure Laterality Date    [place] EGD DELIVER THERMAL ENERGY SPHNCTR/CARDIA GERD       Family History   Family history unknown: Yes      Social History     Tobacco Use    Smoking status: Not on file    Smokeless tobacco: Not on file   Substance Use Topics    Alcohol use: Not on file       Current Facility-Administered Medications   Medication Dose Route Frequency Provider Last Rate Last Admin    albuterol-ipratropium (DUO-NEB) 2.5 MG-0.5 MG/3 ML  3 mL Nebulization Q6HWA Eleno Daniels MD        guaiFENesin ER (MUCINEX) tablet 600 mg  600 mg Oral Q12H Nalini Daniels MD        polyethylene glycol (MIRALAX) packet 17 g  17 g Oral DAILY Reggie Loya MD   17 g at 01/30/22 0801    meropenem (MERREM) 1 g in 0.9% sodium chloride 20 mL IV syringe  1 g IntraVENous Q8H Bao Dennis MD   1 g at 01/30/22 6033    amiodarone (CORDARONE) tablet 200 mg  200 mg Oral DAILY Bao Dennis MD   200 mg at 01/30/22 1008    pantoprazole (PROTONIX) tablet 40 mg  40 mg Oral BID Bao Dennis MD   40 mg at 01/30/22 0801    midodrine (PROAMATINE) tablet 5 mg  5 mg Oral BID WITH MEALS Bao Dennis MD   5 mg at 01/30/22 0754    apixaban (ELIQUIS) tablet 5 mg  5 mg Oral BID Bao Dennis MD   5 mg at 01/30/22 0801    sodium chloride (NS) flush 5-40 mL  5-40 mL IntraVENous Q8H Bao Dennis MD   10 mL at 01/30/22 0655    sodium chloride (NS) flush 5-40 mL  5-40 mL IntraVENous PRN Bao Dennis MD        acetaminophen (TYLENOL) tablet 650 mg  650 mg Oral Q6H PRN Bao Dennis MD        Or   Coreen Portillo acetaminophen (TYLENOL) suppository 650 mg  650 mg Rectal Q6H PRN Bao Dennis MD        polyethylene glycol (MIRALAX) packet 17 g  17 g Oral DAILY PRN Bao Dennis MD        ondansetron (ZOFRAN ODT) tablet 4 mg  4 mg Oral Q8H PRN Bao Dennis MD        Or    ondansetron [facility]) injection 4 mg  4 mg IntraVENous Q6H PRN Bao Dennis MD            Review of Systems   All other systems reviewed and are negative. Objective     Vital signs for last 24 hours:  Visit Vitals  /63   Pulse 100   Temp 98.1 °F (36.7 °C)   Resp 16   Ht 5' 4\" (1.626 m)   Wt 45.4 kg (100 lb)   SpO2 99%   BMI 17.16 kg/m²       Intake/Output this shift:  Current Shift: No intake/output data recorded.   Last 3 Shifts: 01/28 1901 - 01/30 0700  In: 1000 [I.V.:1000]  Out: -     Data Review:   Recent Results (from the past 24 hour(s))   ECHO ADULT COMPLETE    Collection Time: 01/29/22  3:42 PM   Result Value Ref Range    AV Peak Velocity 0.9 m/s    AV Peak Gradient 3 mmHg    Aortic Root 2.1 cm    IVSd 1.3 (A) 0.6 - 0.9 cm    LVIDd 3.2 (A) 3.9 - 5.3 cm    LVIDs 2.2 cm    LVPWd 1.3 (A) 0.6 - 0.9 cm    LA Diameter 3.2 cm    MV E Wave Deceleration Time 102.0 ms    MV A Velocity 0.56 m/s    MV E Velocity 0.72 m/s    PV Max Velocity 0.8 m/s    PV Peak Gradient 3 mmHg    Est. RA Pressure 15 mmHg    RVIDd 3.1 cm    TR Max Velocity 2.13 m/s    TR Peak Gradient 18 mmHg    Fractional Shortening 2D 31 28 - 44 %    LVIDd Index 2.19 cm/m2    LVIDs Index 1.51 cm/m2    LV RWT Ratio 0.81     LV Mass 2D 135.7 67 - 162 g    LV Mass 2D Index 92.9 43 - 95 g/m2    MV E/A 1.29     LA Size Index 2.19 cm/m2    LA/AO Root Ratio 1.52     Ao Root Index 1.44 cm/m2    RVSP 33 mmHg    PASP 33 mmHg   CBC WITH AUTOMATED DIFF    Collection Time: 01/30/22  5:09 AM   Result Value Ref Range    WBC 2.1 (L) 3.6 - 11.0 K/uL    RBC 2.37 (L) 3.80 - 5.20 M/uL    HGB 7.4 (L) 11.5 - 16.0 g/dL    HCT 23.4 (L) 35.0 - 47.0 %    MCV 98.7 80.0 - 99.0 FL    MCH 31.2 26.0 - 34.0 PG    MCHC 31.6 30.0 - 36.5 g/dL    RDW 19.8 (H) 11.5 - 14.5 %    PLATELET 158 (L) 525 - 400 K/uL    MPV 10.6 8.9 - 12.9 FL    NRBC 0.0 0.0  WBC    ABSOLUTE NRBC 0.00 0.00 - 0.01 K/uL    NEUTROPHILS 57 32 - 75 %    LYMPHOCYTES 16 12 - 49 %    MONOCYTES 6 5 - 13 %    EOSINOPHILS 20 (H) 0 - 7 %    BASOPHILS 0 0 - 1 %    IMMATURE GRANULOCYTES 1 (H) 0 - 0.5 %    ABS. NEUTROPHILS 1.2 (L) 1.8 - 8.0 K/UL    ABS. LYMPHOCYTES 0.3 (L) 0.8 - 3.5 K/UL    ABS. MONOCYTES 0.1 0.0 - 1.0 K/UL    ABS. EOSINOPHILS 0.4 0.0 - 0.4 K/UL    ABS. BASOPHILS 0.0 0.0 - 0.1 K/UL    ABS. IMM.  GRANS. 0.0 0.00 - 0.04 K/UL    DF AUTOMATED     METABOLIC PANEL, COMPREHENSIVE    Collection Time: 01/30/22  5:09 AM   Result Value Ref Range    Sodium 141 136 - 145 mmol/L    Potassium 3.0 (L) 3.5 - 5.1 mmol/L    Chloride 105 97 - 108 mmol/L    CO2 36 (H) 21 - 32 mmol/L    Anion gap 0 (L) 5 - 15 mmol/L    Glucose 84 65 - 100 mg/dL    BUN 23 (H) 6 - 20 mg/dL    Creatinine 0.49 (L) 0.55 - 1.02 mg/dL    BUN/Creatinine ratio 47 (H) 12 - 20      GFR est AA >60 >60 ml/min/1.73m2    GFR est non-AA >60 >60 ml/min/1.73m2    Calcium 7.3 (L) 8.5 - 10.1 mg/dL    Bilirubin, total 1.7 (H) 0.2 - 1.0 mg/dL    AST (SGOT) 59 (H) 15 - 37 U/L    ALT (SGPT) 53 12 - 78 U/L    Alk. phosphatase 82 45 - 117 U/L    Protein, total 3.9 (L) 6.4 - 8.2 g/dL    Albumin 1.7 (L) 3.5 - 5.0 g/dL    Globulin 2.2 2.0 - 4.0 g/dL    A-G Ratio 0.8 (L) 1.1 - 2.2         Physical Exam  General: Alert and responsive; elderly/frail  Head: atraumatic  Eye: No overt orbital findings, PERRLA   ENT: No overt hearing loss noted; No nasal lesion; Throat heriberto  Neck: Supple, No overt palpable mass, No significant palpable lymph nodes  Vascular: RUE with PICC  Lung: Coarse breath sounds, right  Heart: S1S2, irreg  Abdomen: Soft, NT, No rigidity, No rebound, Bowel sounds +; flat  Extremities: No edema  M/S: No traumatic change, active mobility noted  Skin: No cyanosis, No rash of significance (other than chronic lesions)  Neurologic: No overt focal motor deficit. Oriented. Psychiatric: Coherent and age appropriate    CT Results  (Last 48 hours)               01/29/22 1359  CTA CHEST W OR W WO CONT Final result    Impression:  Positive for pulmonary embolus, right lower lobe, acute versus subacute; the   report communicated to [facility], [place], operated by [facility], 1451 hours. Cardiomegaly with bilateral   pleural effusions. Atherosclerosis. Hiatus hernia with partially intrathoracic   stomach. Distended gallbladder with sludge. Moderate rectal distention by stool. Degenerative changes of the spine. Convex leftward scoliosis, centered at the   L1-L2 level.        Narrative:  Dose Reduction:    All CT scans at this facility are performed using dose reduction optimization   techniques as appropriate to a performed exam including the following: Automated   exposure control, adjustments of the mA and/or kV according to patient size, or   use of iterative reconstruction technique. IV contrast: 100 cc Isovue 370       TECHNIQUE: Contrast-enhanced images of chest, abdomen, and pelvis. MIP reformats   of thoracic CT angiography. CTA chest: Prominent enlargement of thyroid gland. Asymmetric extension of left   thyroid lobe with multiple calcified nodules to involve the superior and middle   mediastinum, with slight mass effect on the airway at the thoracic inlet and the   mid and upper thoracic esophagus. Calcified tortuous thoracic aorta, without   focal aneurysmal dilatation. Cardiomegaly with dilated left atrium and enlarged   right atrium and right ventricle. Reflux of contrast into dilated IVC and   hepatic veins, consistent with right-sided cardiac dysfunction. Right lower lobe   pulmonary arterial filling defects, consistent with pulmonary emboli, acute   versus subacute. Main pulmonary artery 2.5 cm. Small bilateral pleural   effusions. Hiatus hernia with partially intrathoracic stomach. Thin-walled   cavitary lesion, right middle lobe, possibly abscess or pneumatocele, less   likely neoplasm. Upper lobe/apical predominant emphysema. Diffuse bronchial wall   thickening with endobronchial opacities in the lower lobes bilaterally. No focal   lytic or blastic bone lesion. Degenerative changes of mid thoracic spine. CT abdomen/pelvis: Subcentimeter bilateral hepatic lobe lesions are   statistically likely benign but too small to characterize definitively. Layering   sludge is suspected in the gallbladder. No evidence of radiodense gallbladder   stones. Normal caliber bile duct. Borderline prominence of pancreatic duct. No   focal abnormality is seen involving the pancreas, spleen, or adrenal glands. Subcentimeter renal lesions, not well characterized. No intrinsic abnormality of   the urinary bladder. Hysterectomy. Moderate rectal distention by stool.  Mild   edema of perirectal fat. No evidence of intestinal wall thickening or mechanical   obstruction. Appendix not discretely visualized. No definite pneumoperitoneum. Atherosclerotic calcification and tortuosity of the aorta, without aneurysmal   dilatation. Prominent atherosclerotic calcification of iliac and femoral   arteries. No pathologic pelvic or retroperitoneal adenopathy. Degenerative   changes of the lumbar spine with convex leftward lumbar scoliosis. 01/29/22 1359  CT ABD PELV W CONT Final result    Impression:  Positive for pulmonary embolus, right lower lobe, acute versus subacute; the   report communicated to [facility], 1451 hours. Cardiomegaly with bilateral   pleural effusions. Atherosclerosis. Hiatus hernia with partially intrathoracic   stomach. Distended gallbladder with sludge. Moderate rectal distention by stool. Degenerative changes of the spine. Convex leftward scoliosis, centered at the   L1-L2 level. Narrative:  Dose Reduction:    All CT scans at this facility are performed using dose reduction optimization   techniques as appropriate to a performed exam including the following: Automated   exposure control, adjustments of the mA and/or kV according to patient size, or   use of iterative reconstruction technique. IV contrast: 100 cc Isovue 370       TECHNIQUE: Contrast-enhanced images of chest, abdomen, and pelvis. MIP reformats   of thoracic CT angiography. CTA chest: Prominent enlargement of thyroid gland. Asymmetric extension of left   thyroid lobe with multiple calcified nodules to involve the superior and middle   mediastinum, with slight mass effect on the airway at the thoracic inlet and the   mid and upper thoracic esophagus. Calcified tortuous thoracic aorta, without   focal aneurysmal dilatation. Cardiomegaly with dilated left atrium and enlarged   right atrium and right ventricle.  Reflux of contrast into dilated IVC and   hepatic veins, consistent with right-sided cardiac dysfunction. Right lower lobe   pulmonary arterial filling defects, consistent with pulmonary emboli, acute   versus subacute. Main pulmonary artery 2.5 cm. Small bilateral pleural   effusions. Hiatus hernia with partially intrathoracic stomach. Thin-walled   cavitary lesion, right middle lobe, possibly abscess or pneumatocele, less   likely neoplasm. Upper lobe/apical predominant emphysema. Diffuse bronchial wall   thickening with endobronchial opacities in the lower lobes bilaterally. No focal   lytic or blastic bone lesion. Degenerative changes of mid thoracic spine. CT abdomen/pelvis: Subcentimeter bilateral hepatic lobe lesions are   statistically likely benign but too small to characterize definitively. Layering   sludge is suspected in the gallbladder. No evidence of radiodense gallbladder   stones. Normal caliber bile duct. Borderline prominence of pancreatic duct. No   focal abnormality is seen involving the pancreas, spleen, or adrenal glands. Subcentimeter renal lesions, not well characterized. No intrinsic abnormality of   the urinary bladder. Hysterectomy. Moderate rectal distention by stool. Mild   edema of perirectal fat. No evidence of intestinal wall thickening or mechanical   obstruction. Appendix not discretely visualized. No definite pneumoperitoneum. Atherosclerotic calcification and tortuosity of the aorta, without aneurysmal   dilatation. Prominent atherosclerotic calcification of iliac and femoral   arteries. No pathologic pelvic or retroperitoneal adenopathy. Degenerative   changes of the lumbar spine with convex leftward lumbar scoliosis. 81F with past medical history of hypertension hyperlipidemia CVA. Reportedly positive for coronavirus the first week of december 2021. 1/2/22 presents to [facility] with altered mental status. During the ED course was found to have an infiltrate on chest imaging, and started on broad spectrum antibiotics. Admitted to hospital and improved during the hospital course with meropenem. Hospital course further complicated by pulmonary embolism. 1/6/22 discharged from hospital on oral anticoagulation. 1/7/22 returns to hospital for acute hypoxic respiratory failure. CT reveals new development of Cavitary lesion of the inferior lateral right middle lobe. Admitted to hospital and I have been asked to see her in consultation. 1/10/22 underwent EGD with Dr Martin Arguello  Impression:   Large duodenal bulb ulcer - injected and cauterized  Hiatal hernia  Recommendations:   PPI continuous infusion  NPO, IVF    For the cavitary pneumonia I had her on empiric meropenem with an anticipated stop date of 2/4/22.  1/24/22 discharged from [facility]    1/29/22 presents to [facility] because of lower abdominal pains. Reports constipation for the past few days. No improvement with enemas at home. Admitted to hospital for stabilization and further workup of her condition. MICROBIOLOGY    1/2/22  Covid 19 Positive  1/2/22  Blood  Negative  1/3/22  Urine  Negative    1/7/22  Blood  Negative    1/29/22 Covid 19 Negative  1/29/22 Blood  Negative so far    ASSESSMENT AND RECOMMENDATIONS    1) Pneumonia with development in the setting of SARS-CoV-2 coronaviral respiratory syndrome. Further complicated by new development of right sided cavitary lesion, improved with a long course of meropenem.      Repeat CT chest shows improvement in her pulmonary condition   Meropenem iv through 2/4/22

## 2022-01-30 NOTE — CONSULTS
Consult    Patient: Madiha Reis MRN: [id number]  SSN: [id number]    YOB: 1940  Age: 79 y.o. Sex: female      Subjective:      Madiha Reis is a 79 y.o. female who is being seen for atrial fibrillation. PMHx of covid, pna, on meropenem, history of PE, found to have PE again on CT scan along with bilateral pleural effusion, malnutrition, ?aspiration. Cardiology was asked to help with the management of patient's atrial fibrillation. She has been getting weaker and mostly with issues of abdominal pain - not able to defecate for close to a week now. Patient has had poor PO intake as well, denies any chest pain or shortness of breathe, dizziness, or lightheadedness. History reviewed. No pertinent past medical history.   Past Surgical History:   Procedure Laterality Date    [place] EGD DELIVER THERMAL ENERGY SPHNCTR/CARDIA GERD        Family History   Family history unknown: Yes     Social History     Tobacco Use    Smoking status: Not on file    Smokeless tobacco: Not on file   Substance Use Topics    Alcohol use: Not on file      Current Facility-Administered Medications   Medication Dose Route Frequency Provider Last Rate Last Admin    albuterol-ipratropium (DUO-NEB) 2.5 MG-0.5 MG/3 ML  3 mL Nebulization Q6HWA Nalini Daniels MD   3 mL at 01/30/22 1200    guaiFENesin ER (MUCINEX) tablet 600 mg  600 mg Oral Q12H Nalini Daniels MD        polyethylene glycol (MIRALAX) packet 17 g  17 g Oral DAILY Reggie Loya MD   17 g at 01/30/22 0801    meropenem (MERREM) 1 g in 0.9% sodium chloride 20 mL IV syringe  1 g IntraVENous Q8H Daniella Ca MD   1 g at 01/30/22 0274    amiodarone (CORDARONE) tablet 200 mg  200 mg Oral DAILY Daniella Ca MD   200 mg at 01/30/22 1008    pantoprazole (PROTONIX) tablet 40 mg  40 mg Oral BID Daniella Ca MD   40 mg at 01/30/22 0801    midodrine (PROAMATINE) tablet 5 mg  5 mg Oral BID WITH MEALS Daniella Ca MD   5 mg at 01/30/22 0754    apixaban (ELIQUIS) tablet 5 mg  5 mg Oral BID Bao Dennis MD   5 mg at 01/30/22 0801    sodium chloride (NS) flush 5-40 mL  5-40 mL IntraVENous Q8H Bao Dennis MD   10 mL at 01/30/22 1400    sodium chloride (NS) flush 5-40 mL  5-40 mL IntraVENous PRN Bao Dennis MD        acetaminophen (TYLENOL) tablet 650 mg  650 mg Oral Q6H PRN Bao Dennis MD        Or   Coreen Portillo acetaminophen (TYLENOL) suppository 650 mg  650 mg Rectal Q6H PRN Bao Dennis MD        polyethylene glycol (MIRALAX) packet 17 g  17 g Oral DAILY PRN Bao Dennis MD        ondansetron (ZOFRAN ODT) tablet 4 mg  4 mg Oral Q8H PRN Bao Dennis MD        Or    ondansetron [facility] injection 4 mg  4 mg IntraVENous Q6H PRN Bao Dennis MD            Allergies   Allergen Reactions    Penicillins Other (comments)       Review of Systems:  As per hpi    Objective:     Vitals:    01/30/22 0715 01/30/22 0925 01/30/22 1006 01/30/22 1317   BP: (!) 87/61 90/60 103/63    Pulse: 90 94 100    Resp: 16 15 16    Temp:       SpO2: 95% 98% 99% 100%   Weight:       Height:            Physical Exam:    Cachexia/frail  ncat eomi perrl  irregulary irregular heart sounds with variable s2, decreased breathe sounds at lung bases.    Assessment:     Hospital Problems  Date Reviewed: 1/29/2022          Codes Class Noted POA    Pulmonary emboli ([facility].) ICD-10-CM: I26.99  ICD-9-CM: 415.19  1/29/2022 Yes        * (Principal) Acute respiratory failure with hypoxia [facility]) ICD-10-CM: J96.01  ICD-9-CM: 518.81  1/29/2022 Yes        Lung [facility] ([facility] ICD-10-CM: J85.2  ICD-9-CM: 513.0  1/29/2022 Yes        Deep vein thrombosis (DVT) of lower extremity (HCC) ICD-10-CM: I82.409  ICD-9-CM: 453.40  1/29/2022 Yes        Atrial fibrillation (HCC) ICD-10-CM: I48.91  ICD-9-CM: 427.31  1/29/2022 Yes        Constipation ICD-10-CM: K59.00  ICD-9-CM: 564.00  1/29/2022 Yes        GIB (gastrointestinal bleeding) (Chronic) ICD-10-CM: B52.8  ICD-9-CM: 578.9  1/29/2022 Yes    Overview Signed 1/29/2022  5:04 PM by Wilmar Zamora MD     Recent             Chronic gastric ulcer (Chronic) ICD-10-CM: K25.7  ICD-9-CM: 531.70  1/29/2022 No    Overview Signed 1/29/2022  5:05 PM by Wilmar Zamora MD     Recent             COVID-19 (Chronic) ICD-10-CM: U07.1  ICD-9-CM: 079.89  1/29/2022 Yes    Overview Signed 1/29/2022  5:05 PM by Wilmar Zamora MD     Dec 2021             Anemia ICD-10-CM: D64.9  ICD-9-CM: 285.9  1/29/2022 Yes        Hypokalemia ICD-10-CM: E87.6  ICD-9-CM: 276.8  1/29/2022 Yes        Bilateral pleural effusion ICD-10-CM: J90  ICD-9-CM: 511.9  1/29/2022 Yes        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 I agree that her pleural effusion likely a combination of prior pna and malnutrition. I would hold off on diuretics at this time as she examines intravascular volume depleted. Keep on telemetry -       Left Ventricle: Left ventricle size is normal. Mildly increased wall thickness. Normal wall motion. Normal left ventricular systolic function with a visually estimated EF of 65 - 70%.   Mitral Valve: Mild mitral annular calcification.   Right Atrium: Right atrium is mildly dilated.   Pericardium: Small (<1 cm) pericardial effusion present. No indication of cardiac tamponade.   Technical qualifiers: Echo study was technically difficult due to low parasternal window.   PA systolic pressure is 62ZRV of Hg      Cardiology will continue to follow, thank you for the consult.     Signed By: Karol Crain DO     January 30, 2022

## 2022-01-30 NOTE — ED NOTES
Bedside shift change report given to Sh[address] [place] (oncoming nurse) by Mau Flores (offgoing nurse). Report included the following information SBAR, ED Summary, Procedure Summary, Intake/Output, MAR, Recent Results and Cardiac Rhythm Sinus.

## 2022-01-30 NOTE — CONSULTS
[address] [place] [address]    Name:  Jeffrey Cee  MR#:  [id number]  :  1940  ACCOUNT #:  [de-identified]  DATE OF SERVICE:  2022    ATTENDING PHYSICIAN:  Dr. Rosendo Romberg, hospitalist    REASON FOR CONSULTATION:  The patient with pulmonary embolism, history of COVID-19 pneumonia, lung abscess, and COPD. HISTORY OF PRESENT ILLNESS:  The patient is an 50-year-old frail  female. Information is obtained from current medical records and also from the son at the bedside. I have known the patient from [facility] as well. She has a history of very extensive tobacco abuse and she quit smoking just a few months ago. Apparently in 2021, she was diagnosed with COVID-19. She has received her vaccine some months ago but has not received the booster because it is not time yet. She had a more recent hospitalization at [facility] with venous thromboembolism but was also diagnosed to have a right middle lobe pulmonary abscess. ID was consulted. She was discharged home on Monday of last week with IV meropenem to finish 10 days of treatment. Also on Eliquis. It should be mentioned that her hospitalization was complicated with upper GI bleed due to gastric ulcer which was cauterized and Eliquis was resumed. She also has atrial fibrillation. The son states that ever since her discharge since Monday, she has just been doing okay. Her appetite has been poor. She has not been up on her feet for about a month. However, she has been constipated. She was complaining of abdominal pain because of constipation. She was sweating. Because of this, paramedics were called and she was brought over to the emergency room. She has been on oxygen 2 L at home. Further investigations ensued. She had a CT of the chest as well as abdominal CT, discussed below. Currently, she is rather comfortable on 2 L of oxygen. She is a full code.     PAST MEDICAL HISTORY:  Significant for underlying COPD with a history of very extensive tobacco abuse, COVID-19 pneumonia in 12/2021, atrial fibrillation, dyslipidemia, right middle lobe lung abscess and has been on meropenem IV at home, upper GI bleed due to gastric abscess, status post EGD and cauterization at [facility], venous thromboembolism and has been on Eliquis. MEDICATIONS:  Currently, the patient is started on:  1. Amiodarone 200 mg p.o. daily. 2.  Eliquis 5 mg p.o. b.i.d.  3.  Meropenem 1 g IV q. 8 hourly. 4.  Midodrine 5 mg p.o. b.i.d. with meals. 5.  Protonix 40 mg p.o. b.i.d.  6.  MiraLax 17 g p.o. daily. 7.  KVO normal saline. 8.  She is also on other p.r.n. medications. ALLERGIES:  PENICILLIN. SOCIAL HISTORY:  She currently lives at home. She has not been on her feet for the last one month or so. There is a history of very extensive tobacco abuse and she quit a few months ago. No drug or alcohol abuse. FAMILY HISTORY:  Mostly noncontributory. REVIEW OF SYSTEMS:  Complete review of systems was undertaken. Pertinent findings are discussed above. All other systems were reviewed and are negative. PHYSICAL EXAMINATION:  GENERAL:  The patient is an elderly frail white female who does not appear to be in any distress. She is somnolent but arousable. On 2 L oxygen. Saturation 98%. VITAL SIGNS:  Temperature is 98.1, pulse is 94 per minute, respiratory rate is 15 per minute, and blood pressure is 90/60. She does run chronically low blood pressure. HEENT:  Pupils are equal and reactive to light. Pallor is noted. No icterus. Nasal passages are patent. She is on nasal cannula O2. Oropharynx without thrush. She is edentulous. NECK:  Supple. Trachea is central.  No obvious JVD or lymphadenopathy. She is not using accessory muscle of respiration. CHEST:  Symmetrical.  She has a few scattered rhonchi which improve by coughing. She has some basilar crackles. No wheezing at this time.   HEART: Rhythm is irregularly irregular with monitor showing atrial fibrillation. No loud murmur or gallop. ABDOMEN:  Soft. However, she has some generalized tenderness. She grimaces with palpation. No masses or organomegaly. EXTREMITIES:  Do not show any cyanosis or clubbing. No ankle edema. However, she has dependent edema of her elbows. Pulses are palpable. NEUROLOGICAL:  Examination is grossly intact. SKIN:  Warm and dry. She has right upper extremity PICC line in place. DIAGNOSTIC AND LABORATORY DATA:  She had a CT of the chest done on 01/29/2022, personally reviewed. There is subacute pulmonary emboli in the right lower lobe identified. PA artery measures 2.5 cm in size. She has mild to moderate bilateral pleural effusion with associated mild atelectasis. There is a large hiatal hernia with part of the stomach in the chest.  There is a cystic lesion in the right middle lobe with minimal fluid accumulation in it. There is bronchial wall thickening in the lower lobe with some debris in it. There is enlargement of the thyroid with extension to the mediastinum as well as mediastinal calcified lymphadenopathy. BNP is elevated at 2570. WBC count is 2.1 with neutrophils of 57%, hemoglobin is 7.4, platelet count of 601. INR is 1.5. D-dimer is 1.91. Electrolytes are within normal limits, although potassium is 3.0.  BUN is 23, creatinine is 0.49. Blood glucose of 84. Rapid COVID test was negative. Echocardiogram done on 01/29/2022 showed an EF of 65-70% and PA pressure of 33. Right ventricle is not dilated. CT of the abdomen showed gallbladder sludge and constipation. ASSESSMENT AND PLAN:  1. An 70-year-old frail  female with a history of very extensive tobacco abuse. She has underlying chronic obstructive pulmonary disease but does not appear to be in exacerbation. She had a recent hospitalization in [facility] a couple of times.   In 12/2021, she had COVID-19 pneumonia. More recently hospitalized with right middle lobe abscess and has been getting IV meropenem at home. Also venous thromboembolism and has been on Eliquis. Meropenem and Eliquis have been resumed. CTA shows subacute thrombus. There is no focal airspace disease. Continue with oxygen 2 L which she already has at home. I will start her on nebulized bronchodilator treatments around-the-clock and Mucinex. However, her CT scan findings are also suggestive of occult aspiration. I will involve speech therapist.  She appears to be at risk for aspiration. Her oral intake has been poor and there is apparently some weight loss. 2.  Small to moderate bilateral pleural effusions. She appears to be third spacing. She also has dependent edema. Albumin is 1.7 only. Her EF is normal.  I believe that her effusions and third spacing is due to hypoalbuminemia and malnutrition. 3.  Atrial fibrillation and venous thromboembolism. Continue with Eliquis. Monitor hemoglobin. 4.  History of gastrointestinal bleed due to gastric ulcer, status post cauterization at [facility]. Continue with Protonix p.o. b.i.d. She also has gallbladder sludge. 5.  Constipation. Further management as per primary attending. 6.  Enlarged thyroid and part of that appears to be retrosternal.  She also has mediastinal calcified lymphadenopathy. I believe this is all part and parcel of prior granulomatous disease. No acute problems. Thank you for allowing me to participate in the care of this patient. I will follow the patient closely with you. Discussed with her son at the bedside. She is a full code. Prognosis, however, appears to be poor. The patient was evaluated in the ER. Discussed with the nursing staff.       MD [email or web address] [id number]  D:  01/30/2022 10:12  T:  01/30/2022 14:04  JOB #:  [id number]

## 2022-01-31 LAB
ALBUMIN SERPL-MCNC: 1.8 G/DL (ref 3.5–5)
ALBUMIN/GLOB SERPL: 0.7 {RATIO} (ref 1.1–2.2)
ALP SERPL-CCNC: 97 U/L (ref 45–117)
ALT SERPL-CCNC: 80 U/L (ref 12–78)
ANION GAP SERPL CALC-SCNC: 2 MMOL/L (ref 5–15)
AST SERPL W P-5'-P-CCNC: 76 U/L (ref 15–37)
ATRIAL RATE: 300 BPM
BASOPHILS # BLD: 0 K/UL (ref 0–0.1)
BASOPHILS NFR BLD: 0 % (ref 0–1)
BILIRUB SERPL-MCNC: 1.9 MG/DL (ref 0.2–1)
BUN SERPL-MCNC: 24 MG/DL (ref 6–20)
BUN/CREAT SERPL: 43 (ref 12–20)
CA-I BLD-MCNC: 7.8 MG/DL (ref 8.5–10.1)
CALCULATED R AXIS, ECG10: -10 DEGREES
CALCULATED T AXIS, ECG11: -120 DEGREES
CHLORIDE SERPL-SCNC: 105 MMOL/L (ref 97–108)
CO2 SERPL-SCNC: 33 MMOL/L (ref 21–32)
CREAT SERPL-MCNC: 0.56 MG/DL (ref 0.55–1.02)
DIAGNOSIS, 93000: NORMAL
DIFFERENTIAL METHOD BLD: ABNORMAL
EOSINOPHIL # BLD: 0.2 K/UL (ref 0–0.4)
EOSINOPHIL NFR BLD: 18 % (ref 0–7)
ERYTHROCYTE [DISTWIDTH] IN BLOOD BY AUTOMATED COUNT: 19.8 % (ref 11.5–14.5)
GLOBULIN SER CALC-MCNC: 2.7 G/DL (ref 2–4)
GLUCOSE SERPL-MCNC: 113 MG/DL (ref 65–100)
HCT VFR BLD AUTO: 28.3 % (ref 35–47)
HGB BLD-MCNC: 8.9 G/DL (ref 11.5–16)
IMM GRANULOCYTES # BLD AUTO: 0 K/UL (ref 0–0.04)
IMM GRANULOCYTES NFR BLD AUTO: 1 % (ref 0–0.5)
LYMPHOCYTES # BLD: 0.5 K/UL (ref 0.8–3.5)
LYMPHOCYTES NFR BLD: 47 % (ref 12–49)
MCH RBC QN AUTO: 31.1 PG (ref 26–34)
MCHC RBC AUTO-ENTMCNC: 31.4 G/DL (ref 30–36.5)
MCV RBC AUTO: 99 FL (ref 80–99)
MONOCYTES # BLD: 0.1 K/UL (ref 0–1)
MONOCYTES NFR BLD: 11 % (ref 5–13)
NEUTS SEG # BLD: 0.2 K/UL (ref 1.8–8)
NEUTS SEG NFR BLD: 23 % (ref 32–75)
NRBC # BLD: 0 K/UL (ref 0–0.01)
NRBC BLD-RTO: 0 PER 100 WBC
PLATELET # BLD AUTO: 211 K/UL (ref 150–400)
PMV BLD AUTO: 11.2 FL (ref 8.9–12.9)
POTASSIUM SERPL-SCNC: 3.6 MMOL/L (ref 3.5–5.1)
PROT SERPL-MCNC: 4.5 G/DL (ref 6.4–8.2)
Q-T INTERVAL, ECG07: 342 MS
QRS DURATION, ECG06: 82 MS
QTC CALCULATION (BEZET), ECG08: 436 MS
RBC # BLD AUTO: 2.86 M/UL (ref 3.8–5.2)
SODIUM SERPL-SCNC: 140 MMOL/L (ref 136–145)
VENTRICULAR RATE, ECG03: 98 BPM
WBC # BLD AUTO: 1 K/UL (ref 3.6–11)

## 2022-01-31 PROCEDURE — 36415 COLL VENOUS BLD VENIPUNCTURE: CPT

## 2022-01-31 PROCEDURE — 80053 COMPREHEN METABOLIC PANEL: CPT

## 2022-01-31 PROCEDURE — 74011000250 HC RX REV CODE- 250: Performed by: INTERNAL MEDICINE

## 2022-01-31 PROCEDURE — 74011250637 HC RX REV CODE- 250/637: Performed by: INTERNAL MEDICINE

## 2022-01-31 PROCEDURE — 65270000029 HC RM PRIVATE

## 2022-01-31 PROCEDURE — 97162 PT EVAL MOD COMPLEX 30 MIN: CPT

## 2022-01-31 PROCEDURE — 94640 AIRWAY INHALATION TREATMENT: CPT

## 2022-01-31 PROCEDURE — 77010033678 HC OXYGEN DAILY

## 2022-01-31 PROCEDURE — 97530 THERAPEUTIC ACTIVITIES: CPT

## 2022-01-31 PROCEDURE — 94760 N-INVAS EAR/PLS OXIMETRY 1: CPT

## 2022-01-31 PROCEDURE — 74011250636 HC RX REV CODE- 250/636: Performed by: INTERNAL MEDICINE

## 2022-01-31 PROCEDURE — 97165 OT EVAL LOW COMPLEX 30 MIN: CPT

## 2022-01-31 PROCEDURE — 74011250637 HC RX REV CODE- 250/637: Performed by: STUDENT IN AN ORGANIZED HEALTH CARE EDUCATION/TRAINING PROGRAM

## 2022-01-31 PROCEDURE — 92610 EVALUATE SWALLOWING FUNCTION: CPT

## 2022-01-31 PROCEDURE — 85025 COMPLETE CBC W/AUTO DIFF WBC: CPT

## 2022-01-31 RX ADMIN — POLYETHYLENE GLYCOL 3350 17 G: 17 POWDER, FOR SOLUTION ORAL at 10:06

## 2022-01-31 RX ADMIN — SODIUM CHLORIDE, PRESERVATIVE FREE 1 G: 5 INJECTION INTRAVENOUS at 10:05

## 2022-01-31 RX ADMIN — PANTOPRAZOLE SODIUM 40 MG: 40 TABLET, DELAYED RELEASE ORAL at 21:19

## 2022-01-31 RX ADMIN — IPRATROPIUM BROMIDE AND ALBUTEROL SULFATE 3 ML: .5; 2.5 SOLUTION RESPIRATORY (INHALATION) at 07:41

## 2022-01-31 RX ADMIN — LACTULOSE 30 ML: 20 SOLUTION ORAL at 14:20

## 2022-01-31 RX ADMIN — SODIUM CHLORIDE, PRESERVATIVE FREE 10 ML: 5 INJECTION INTRAVENOUS at 14:00

## 2022-01-31 RX ADMIN — IPRATROPIUM BROMIDE AND ALBUTEROL SULFATE 3 ML: .5; 2.5 SOLUTION RESPIRATORY (INHALATION) at 19:26

## 2022-01-31 RX ADMIN — MIDODRINE HYDROCHLORIDE 5 MG: 5 TABLET ORAL at 17:04

## 2022-01-31 RX ADMIN — AMIODARONE HYDROCHLORIDE 200 MG: 200 TABLET ORAL at 10:05

## 2022-01-31 RX ADMIN — SODIUM CHLORIDE, PRESERVATIVE FREE 1 G: 5 INJECTION INTRAVENOUS at 01:53

## 2022-01-31 RX ADMIN — SODIUM CHLORIDE, PRESERVATIVE FREE 1 G: 5 INJECTION INTRAVENOUS at 17:04

## 2022-01-31 RX ADMIN — GUAIFENESIN 600 MG: 600 TABLET, EXTENDED RELEASE ORAL at 21:19

## 2022-01-31 RX ADMIN — APIXABAN 5 MG: 5 TABLET, FILM COATED ORAL at 21:19

## 2022-01-31 RX ADMIN — MIDODRINE HYDROCHLORIDE 5 MG: 5 TABLET ORAL at 10:05

## 2022-01-31 RX ADMIN — PANTOPRAZOLE SODIUM 40 MG: 40 TABLET, DELAYED RELEASE ORAL at 10:06

## 2022-01-31 RX ADMIN — IPRATROPIUM BROMIDE AND ALBUTEROL SULFATE 3 ML: .5; 2.5 SOLUTION RESPIRATORY (INHALATION) at 13:17

## 2022-01-31 RX ADMIN — APIXABAN 5 MG: 5 TABLET, FILM COATED ORAL at 10:06

## 2022-01-31 RX ADMIN — SODIUM CHLORIDE, PRESERVATIVE FREE 10 ML: 5 INJECTION INTRAVENOUS at 07:07

## 2022-01-31 RX ADMIN — GUAIFENESIN 600 MG: 600 TABLET, EXTENDED RELEASE ORAL at 10:05

## 2022-01-31 RX ADMIN — SODIUM CHLORIDE, PRESERVATIVE FREE 10 ML: 5 INJECTION INTRAVENOUS at 21:19

## 2022-01-31 NOTE — PROGRESS NOTES
Infectious Diseases Progress Note  Alex Serra MD  [phone]    Date:2022       Room:[facility]  Patient Chico Carlson     YOB: 1940     Age:81 y.o. 81F with past medical history of hypertension hyperlipidemia CVA. Reportedly positive for coronavirus the first week of 2021.     22 presents to [facility] with altered mental status. During the ED course was found to have an infiltrate on chest imaging, and started on broad spectrum antibiotics. Admitted to hospital and improved during the hospital course with meropenem. Hospital course further complicated by pulmonary embolism. 22 discharged from hospital on oral anticoagulation.     22 returns to hospital for acute hypoxic respiratory failure. CT reveals new development of Cavitary lesion of the inferior lateral right middle lobe. Admitted to hospital and I have been asked to see her in consultation.     1/10/22 underwent EGD with Dr Malaika Mackey:   Large duodenal bulb ulcer - injected and cauterized  Hiatal hernia  Recommendations:   PPI continuous infusion  NPO, IVF     For the cavitary pneumonia I had her on empiric meropenem with an anticipated stop date of 22.  22 discharged from [facility]     22 presents to [facility] because of lower abdominal pains. Reports constipation for the past few days. No improvement with enemas at home. Admitted to hospital for stabilization and further workup of her condition. Subjective    Subjective:  Symptoms:  Stable. Review of Systems   All other systems reviewed and are negative.     Objective         Vitals Last 24 Hours:  TEMPERATURE:  Temp  Av.7 °F (36.5 °C)  Min: 97.6 °F (36.4 °C)  Max: 97.8 °F (36.6 °C)  RESPIRATIONS RANGE: Resp  Av.3  Min: 16  Max: 18  PULSE OXIMETRY RANGE: SpO2  Av.6 %  Min: 95 %  Max: 100 %  PULSE RANGE: Pulse  Av.6  Min: 80  Max: 115  BLOOD PRESSURE RANGE: Systolic (12JDX), KLL:51 , Min:90 , PTJ:950   ; Diastolic (24hrs), Av, Min:60, Max:75    I/O (24Hr): Intake/Output Summary (Last 24 hours) at 2022 1217  Last data filed at 2022 0220  Gross per 24 hour   Intake    Output 1 ml   Net -1 ml     Objective:  General Appearance:  Comfortable. Vital signs: (most recent): Blood pressure 97/75, pulse 89, temperature 97.7 °F (36.5 °C), resp. rate 16, height 5' 4\" (1.626 m), weight 45.4 kg (100 lb), SpO2 100 %. Vital signs are normal.    HEENT: Normal HEENT exam.    Lungs:  Normal effort and normal respiratory rate. Heart: Normal rate. Abdomen: Abdomen is soft. Neurological: Patient is alert. Skin:  Warm and dry. Labs/Imaging/Diagnostics    Labs:  CBC:  Recent Labs     22  20422  0509 22  1201   WBC  --  2.1* 4.0   RBC  --  2.37* 2.87*   HGB 8.7* 7.4* 8.9*   HCT 27.5* 23.4* 28.3*   MCV  --  98.7 98.6   RDW  --  19.8* 19.6*   PLT  --  147* 161     CHEMISTRIES:  Recent Labs     22  0509 22  1201    140   K 3.0* 3.0*    102   CO2 36* 36*   BUN 23* 26*   CA 7.3* 7.6*   PT/INR:  Recent Labs     22  1201   INR 1.5*     APTT:  Recent Labs     22  1201   APTT 32.0     LIVER PROFILE:  Recent Labs     22  0509 22  1201   AST 59* 60*   ALT 53 54     Lab Results   Component Value Date/Time    ALT (SGPT) 53 2022 05:09 AM    AST (SGOT) 59 (H) 2022 05:09 AM    Alk. phosphatase 82 2022 05:09 AM    Bilirubin, total 1.7 (H) 2022 05:09 AM       Imaging Last 24 Hours:  No results found.   Assessment//Plan   Principal Problem:    Acute respiratory failure with hypoxia ([address] (2022)    Active Problems:    Pulmonary emboli ([address] (2022)      Lung abscess ([address] (2022)      Deep vein thrombosis (DVT) of lower extremity (HCC) (2022)      Atrial fibrillation ([address] (2022)      Constipation (2022)      GIB (gastrointestinal bleeding) (2022)      Overview: Recent      Chronic gastric ulcer (2022) Method Of Treatment: injection Overview: Recent      COVID-19 (1/29/2022)      Overview: Dec 2021      Anemia (1/29/2022)      Hypokalemia (1/29/2022)      Bilateral pleural effusion (1/29/2022)      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 Reportedly positive for coronavirus the first week of december 2021.     1/2/22 presents to [facility] with altered mental status. During the ED course was found to have an infiltrate on chest imaging, and started on broad spectrum antibiotics. Admitted to hospital and improved during the hospital course with meropenem. Hospital course further complicated by pulmonary embolism. 1/6/22 discharged from hospital on oral anticoagulation.     1/7/22 returns to hospital for acute hypoxic respiratory failure. CT reveals new development of Cavitary lesion of the inferior lateral right middle lobe. Admitted to hospital and I have been asked to see her in consultation.     1/10/22 underwent EGD with Dr Warner Soni:   Large duodenal bulb ulcer - injected and cauterized  Hiatal hernia  Recommendations:   PPI continuous infusion  NPO, IVF     For the cavitary pneumonia I had her on empiric meropenem with an anticipated stop date of 2/4/22.  1/24/22 discharged from [facility]     1/29/22 presents to [facility] because of lower abdominal pains. Reports constipation for the past few days. No improvement with enemas at home.  Admitted to hospital for stabilization and further workup of her condition.     MICROBIOLOGY     1/2/22              Covid 19          Positive  1/2/22              Blood               Negative  1/3/22              Urine                Negative  1/7/22              Blood               Negative  1/29/22            Covid 19          Negative  1/29/22            Blood               Negative     ASSESSMENT AND RECOMMENDATIONS     1) Concentration (Units/Cc): 1 Pneumonia with development in the setting of SARS-CoV-2 coronaviral respiratory syndrome.  Further complicated by new development of right sided cavitary lesion, improved with a long course of meropenem.                 Repeat CT chest shows improvement in her pulmonary condition              Meropenem iv through 2/4/22      Electronically signed by Michelet Kendall MD on 1/31/2022 at 12:17 PM Consent: The patient's consent was obtained including but not limited to risks of crusting, scabbing, scarring, blistering, flagellate hyperpigmentation, local vasospasm, darker or lighter pigmentary change, recurrence, incomplete removal and infection. Post-Care Instructions: I reviewed with the patient in detail post-care instructions. The patient understands that the treated areas should be washed off 6 to 8 hours after application. Bill J-Code: yes Total Volume (Ccs): 0.6 Add 52 Modifier (Optional): no Detail Level: Detailed Anesthesia Type: 1% lidocaine with epinephrine

## 2022-01-31 NOTE — PROGRESS NOTES
Pulmonary/CC Progress Note  Elderly patient with history of extensive tobacco abuse, COPD recently treated for COVID-19 pneumonia in 2021. Recently she was hospitalized with right middle lobe abscess and had been on IV meropenem. CT scan showed subacute thrombus    Subjective:   Daily Progress Note: 2022 4:16 PM    Patient without significant distress. Afebrile.     Current Facility-Administered Medications   Medication Dose Route Frequency    lactulose (CHRONULAC) 10 gram/15 mL solution 30 mL  30 mL Oral DAILY PRN    guaiFENesin ER (MUCINEX) tablet 600 mg  600 mg Oral Q12H    albuterol-ipratropium (DUO-NEB) 2.5 MG-0.5 MG/3 ML  3 mL Nebulization Q6HWA RT    polyethylene glycol (MIRALAX) packet 17 g  17 g Oral DAILY    meropenem (MERREM) 1 g in 0.9% sodium chloride 20 mL IV syringe  1 g IntraVENous Q8H    amiodarone (CORDARONE) tablet 200 mg  200 mg Oral DAILY    pantoprazole (PROTONIX) tablet 40 mg  40 mg Oral BID    midodrine (PROAMATINE) tablet 5 mg  5 mg Oral BID WITH MEALS    apixaban (ELIQUIS) tablet 5 mg  5 mg Oral BID    sodium chloride (NS) flush 5-40 mL  5-40 mL IntraVENous Q8H    sodium chloride (NS) flush 5-40 mL  5-40 mL IntraVENous PRN    acetaminophen (TYLENOL) tablet 650 mg  650 mg Oral Q6H PRN    Or    acetaminophen (TYLENOL) suppository 650 mg  650 mg Rectal Q6H PRN    polyethylene glycol (MIRALAX) packet 17 g  17 g Oral DAILY PRN    ondansetron (ZOFRAN ODT) tablet 4 mg  4 mg Oral Q8H PRN    Or    ondansetron (ZOFRAN) injection 4 mg  4 mg IntraVENous Q6H PRN        Review of Systems  Unchanged from initial consult    Objective:     Visit Vitals  BP 95/63 (BP 1 Location: Left upper arm)   Pulse 66   Temp 97.9 °F (36.6 °C)   Resp 20   Ht 5' 4\" (1.626 m)   Wt 45.4 kg (100 lb)   SpO2 94%   BMI 17.16 kg/m²    O2 Flow Rate (L/min): 2 l/min O2 Device: Nasal cannula    Temp (24hrs), Av.7 °F (36.5 °C), Min:97.3 °F (36.3 °C), Max:97.9 °F (36.6 °C)      No intake/output data recorded. 01/29 1901 - 01/31 0700  In: 1000 [I.V.:1000]  Out: 1     HEENT: Normal HEENT exam.    Lungs:  Normal effort and normal respiratory rate. Heart: Normal rate. Abdomen: Abdomen is soft. Neurological: Patient is alert. Skin:  Warm and dry. Lab/Data Review:  Recent Labs     01/31/22  1316 01/30/22  2042 01/30/22  0509 01/29/22  1201 01/29/22  1201   WBC 1.0*  --  2.1*  --  4.0   HGB 8.9* 8.7* 7.4*   < > 8.9*   HCT 28.3* 27.5* 23.4*   < > 28.3*     --  147*  --  161    < > = values in this interval not displayed. Recent Labs     01/31/22  1316 01/30/22  0509 01/29/22  1201    141 140   K 3.6 3.0* 3.0*    105 102   CO2 33* 36* 36*   * 84 110*   BUN 24* 23* 26*   CREA 0.56 0.49* 0.53*   CA 7.8* 7.3* 7.6*   ALB 1.8* 1.7* 1.6*   TBILI 1.9* 1.7* 1.6*   ALT 80* 53 54   INR  --   --  1.5*     No results for input(s): PH, PCO2, PO2, HCO3, FIO2 in the last 72 hours. Diagnostics   CXR Results  (Last 48 hours)               01/30/22 0926  XR CHEST PORT Final result    Impression:  Comparison 1/29/2022. Interval development of mild pulmonary edema. Narrative:  Chest one view. AP semiupright portable at 0925 dated 1/30/2022. Comparison 1/29/2022. A right arm PICC line remains in place. The heart is enlarged with calcified   plaque and tortuosity in the aorta. Right pleural fluid/pleural thickening is redemonstrated. There has been interval development of mild interstitial edema in perihilar and   lower lobe distributions. There is no lobar consolidation or pneumothorax.                     Assessment/Plan:     Principal Problem:    Acute respiratory failure with hypoxia ([facility] (1/29/2022)    Active Problems:    Pulmonary emboli ([address] (1/29/2022)      Lung abscess ([address] (1/29/2022)      Deep vein thrombosis (DVT) of lower extremity ([address] (1/29/2022)      Atrial fibrillation ([address] (1/29/2022)      Constipation (1/29/2022)      GIB (gastrointestinal bleeding) (1/29/2022)      Overview: Recent      Chronic gastric ulcer (1/29/2022)      Overview: Recent      COVID-19 (1/29/2022)      Overview: Dec 2021      Anemia (1/29/2022)      Hypokalemia (1/29/2022)      Bilateral pleural effusion (1/29/2022)      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 An 18-year-old frail  female with a history of very extensive tobacco abuse. She has underlying chronic obstructive pulmonary disease but does not appear to be in exacerbation. She had a recent hospitalization in [facility] a couple of times. In 12/2021, she had COVID-19 pneumonia. More recently hospitalized with right middle lobe abscess and has been getting IV meropenem at home. Also venous thromboembolism and has been on Eliquis. Meropenem and Eliquis have been resumed. CTA shows subacute thrombus. There is no focal airspace disease. Continue with oxygen 2 L which she already has at home. I will start her on nebulized bronchodilator treatments around-the-clock and Mucinex. However, her CT scan findings are also suggestive of occult aspiration. I will involve speech therapist.  She appears to be at risk for aspiration. Her oral intake has been poor and there is apparently some weight loss. 2.  Small to moderate bilateral pleural effusions. She appears to be third spacing. She also has dependent edema. Albumin is 1.7 only. Her EF is normal.  I believe that her effusions and third spacing is due to hypoalbuminemia and malnutrition. 3.  Atrial fibrillation and venous thromboembolism. Continue with Eliquis. Monitor hemoglobin. 4.  History of gastrointestinal bleed due to gastric ulcer, status post cauterization at [facility]. Continue with Protonix p.o. b.i.d. She also has gallbladder sludge. 5.  Constipation. Further management as per primary attending.   6. Enlarged thyroid and part of that appears to be retrosternal.  She also has mediastinal calcified lymphadenopathy. I believe this is all part and parcel of prior granulomatous disease. No acute problems. Care Plan discussed with: Patient    Total time spent with patient: 30 minutes.     Yvon Gao MD  [facility]

## 2022-01-31 NOTE — PROGRESS NOTES
Problem: Dysphagia (Adult)  Goal: *Acute Goals and Plan of Care (Insert Text)  Description: Speech Therapy Goals  Initiated 1/31/2022  -Patient will tolerate soft/bite size diet with thin liquids without signs/symptoms of aspiration given no cues within 7 day(s). [ ] Not met  [ ]  MET   [ ] Progressing  [ ] Amy Banuelos  -Patient will demonstrate understanding of swallow safety precautions and aspiration precautions, diet recs with no cues within 7  day(s). [ ] Not met  [ ]  MET   [ ] Progressing  [ ] Discontinue    Outcome: Not Met   SPEECH [address] EVALUATION  Patient: Dwight Quiles (21 y.o. female)  Date: 1/31/2022  Primary Diagnosis: Acute respiratory failure with hypoxia (HCC) [J96.01]  Pleural effusion [J90]  Lung abscess ([address] [J85.2]        Precautions: aspiration       ASSESSMENT :  Based on the objective data described below, the patient presents with oropharyngeal sw function largely [facility] with underlying GI concerns. Pt seen for bedside sw evaluation. Pt with complicated recent medical hx with note of recent COVID-19 dx, gastric ulcer requiring cauterization at OSH. Left pleural effusion noted, lung abscess. CTA chest results note with RLL PE per report. Pt and daughter report pt with significant ongoing constipation with significant abdominal pain with PO intake. Typical diet is regular/thin. Oral motor function is symmetrical with mild global weakness present. Pt given trials of thin via straw, puree and soft solids. Upper and lower dentures present. Oral phase largely [facility] for trials provided. Pharyngeal phase with min delay, WFL once initiated. Pt with DELAYED wet cough after rapid sequential sips of thin, otherwise, no overt s/s aspiration with limited trials accepted. Pt moans in pain after PO trials. Pt also noted to have wet cough at baseline. Patient will benefit from skilled intervention to address the above impairments.   Patients rehabilitation potential is considered to be Guarded     PLAN :  Recommendations and Planned Interventions: At this time, pt's oropharyngeal sw function is appropriate for soft and bite sized diet with thin liquids with 1:1 assistance with ALL PO intake, STRICT aspiration and GERD precautions, monitor pt closely for s/s aspiration, meds crushed if able in puree, FEED ONLY IF AWAKE AND ALERT. However, given pt's significant abdominal pain, pt may benefit from downgrade to clear liquids pending further GI assessment. Would not recommend MBS at this time due to constipation and pain reported; however, will request MBS if aspiration concerns persist.   Frequency/Duration: Patient will be followed by speech-language pathology 4 times a week to address goals. Discharge Recommendations: cont SLP tx at this time. SUBJECTIVE:   Patient alert, agreeable, Daughter Gladys Nicole is present with pt's consent. OBJECTIVE:     CXR Results  (Last 48 hours)                 01/30/22 0926  XR CHEST PORT Final result    Impression:  Comparison 1/29/2022. Interval development of mild pulmonary edema. Narrative:  Chest one view. AP semiupright portable at 0925 dated 1/30/2022. Comparison 1/29/2022. A right arm PICC line remains in place. The heart is enlarged with calcified   plaque and tortuosity in the aorta. Right pleural fluid/pleural thickening is redemonstrated. There has been interval development of mild interstitial edema in perihilar and   lower lobe distributions. There is no lobar consolidation or pneumothorax. 01/29/22 1204  XR CHEST PORT Final result    Impression:  Comparison 6/22/2020. Central line position as above. Right hemithorax pleural fluid/pleural thickening. Additional chronic findings   as above. Narrative:  Chest one view. AP upright portable at 1204 dated 1/29/2022. Comparison 6/22/2020. The right arm PICC line tip is in the right atrium.  The heart is normal in size. There is tortuosity in the aorta. The lungs are hyperexpanded with new right   hemithorax pleural thickening/pleural fluid. Opacification to the right of the trachea in the superior mediastinum may   reflect a thyroid lesion or tortuosity in brachiocephalic vessels, stable. There is incompletely evaluated thoracic/lumbar spine curvature which is convex   to the left. CT Results  (Last 48 hours)                 01/29/22 1359  CTA CHEST W OR W WO CONT Final result    Impression:  Positive for pulmonary embolus, right lower lobe, acute versus subacute; the   report communicated to Sadia Jones, 1451 hours. Cardiomegaly with bilateral   pleural effusions. Atherosclerosis. Hiatus hernia with partially intrathoracic   stomach. Distended gallbladder with sludge. Moderate rectal distention by stool. Degenerative changes of the spine. Convex leftward scoliosis, centered at the   L1-L2 level. Narrative:  Dose Reduction:    All CT scans at this facility are performed using dose reduction optimization   techniques as appropriate to a performed exam including the following: Automated   exposure control, adjustments of the mA and/or kV according to patient size, or   use of iterative reconstruction technique. IV contrast: 100 cc Isovue 370       TECHNIQUE: Contrast-enhanced images of chest, abdomen, and pelvis. MIP reformats   of thoracic CT angiography. CTA chest: Prominent enlargement of thyroid gland. Asymmetric extension of left   thyroid lobe with multiple calcified nodules to involve the superior and middle   mediastinum, with slight mass effect on the airway at the thoracic inlet and the   mid and upper thoracic esophagus. Calcified tortuous thoracic aorta, without   focal aneurysmal dilatation. Cardiomegaly with dilated left atrium and enlarged   right atrium and right ventricle.  Reflux of contrast into dilated IVC and   hepatic veins, consistent with right-sided cardiac dysfunction. Right lower lobe   pulmonary arterial filling defects, consistent with pulmonary emboli, acute   versus subacute. Main pulmonary artery 2.5 cm. Small bilateral pleural   effusions. Hiatus hernia with partially intrathoracic stomach. Thin-walled   cavitary lesion, right middle lobe, possibly abscess or pneumatocele, less   likely neoplasm. Upper lobe/apical predominant emphysema. Diffuse bronchial wall   thickening with endobronchial opacities in the lower lobes bilaterally. No focal   lytic or blastic bone lesion. Degenerative changes of mid thoracic spine. CT abdomen/pelvis: Subcentimeter bilateral hepatic lobe lesions are   statistically likely benign but too small to characterize definitively. Layering   sludge is suspected in the gallbladder. No evidence of radiodense gallbladder   stones. Normal caliber bile duct. Borderline prominence of pancreatic duct. No   focal abnormality is seen involving the pancreas, spleen, or adrenal glands. Subcentimeter renal lesions, not well characterized. No intrinsic abnormality of   the urinary bladder. Hysterectomy. Moderate rectal distention by stool. Mild   edema of perirectal fat. No evidence of intestinal wall thickening or mechanical   obstruction. Appendix not discretely visualized. No definite pneumoperitoneum. Atherosclerotic calcification and tortuosity of the aorta, without aneurysmal   dilatation. Prominent atherosclerotic calcification of iliac and femoral   arteries. No pathologic pelvic or retroperitoneal adenopathy. Degenerative   changes of the lumbar spine with convex leftward lumbar scoliosis. 01/29/22 1359  CT ABD PELV W CONT Final result    Impression:  Positive for pulmonary embolus, right lower lobe, acute versus subacute; the   report communicated to Kasia Armenta, 1451 hours. Cardiomegaly with bilateral   pleural effusions. Atherosclerosis. Hiatus hernia with partially intrathoracic   stomach. Distended gallbladder with sludge. Moderate rectal distention by stool. Degenerative changes of the spine. Convex leftward scoliosis, centered at the   L1-L2 level. Narrative:  Dose Reduction:    All CT scans at this facility are performed using dose reduction optimization   techniques as appropriate to a performed exam including the following: Automated   exposure control, adjustments of the mA and/or kV according to patient size, or   use of iterative reconstruction technique. IV contrast: 100 cc Isovue 370       TECHNIQUE: Contrast-enhanced images of chest, abdomen, and pelvis. MIP reformats   of thoracic CT angiography. CTA chest: Prominent enlargement of thyroid gland. Asymmetric extension of left   thyroid lobe with multiple calcified nodules to involve the superior and middle   mediastinum, with slight mass effect on the airway at the thoracic inlet and the   mid and upper thoracic esophagus. Calcified tortuous thoracic aorta, without   focal aneurysmal dilatation. Cardiomegaly with dilated left atrium and enlarged   right atrium and right ventricle. Reflux of contrast into dilated IVC and   hepatic veins, consistent with right-sided cardiac dysfunction. Right lower lobe   pulmonary arterial filling defects, consistent with pulmonary emboli, acute   versus subacute. Main pulmonary artery 2.5 cm. Small bilateral pleural   effusions. Hiatus hernia with partially intrathoracic stomach. Thin-walled   cavitary lesion, right middle lobe, possibly abscess or pneumatocele, less   likely neoplasm. Upper lobe/apical predominant emphysema. Diffuse bronchial wall   thickening with endobronchial opacities in the lower lobes bilaterally. No focal   lytic or blastic bone lesion. Degenerative changes of mid thoracic spine. CT abdomen/pelvis: Subcentimeter bilateral hepatic lobe lesions are   statistically likely benign but too small to characterize definitively.  Layering   sludge is suspected in the gallbladder. No evidence of radiodense gallbladder   stones. Normal caliber bile duct. Borderline prominence of pancreatic duct. No   focal abnormality is seen involving the pancreas, spleen, or adrenal glands. Subcentimeter renal lesions, not well characterized. No intrinsic abnormality of   the urinary bladder. Hysterectomy. Moderate rectal distention by stool. Mild   edema of perirectal fat. No evidence of intestinal wall thickening or mechanical   obstruction. Appendix not discretely visualized. No definite pneumoperitoneum. Atherosclerotic calcification and tortuosity of the aorta, without aneurysmal   dilatation. Prominent atherosclerotic calcification of iliac and femoral   arteries. No pathologic pelvic or retroperitoneal adenopathy. Degenerative   changes of the lumbar spine with convex leftward lumbar scoliosis. History reviewed. No pertinent past medical history. Past Surgical History:   Procedure Laterality Date    MO EGD DELIVER THERMAL ENERGY SPHNCTR/CARDIA GERD       Prior Level of Function/Home Situation:   Home Situation  Home Environment: Private residence  One/Two Story Residence: One story  Living Alone: No  Support Systems: Child(mynor),Other Family Member(s)  Patient Expects to be Discharged to[de-identified] Home  Current DME Used/Available at Home: Oxygen, portable,Walker  Diet prior to admission: soft-regular  Current Diet:  Regular/thin   Cognitive and Communication Status:  Neurologic State: Alert  Orientation Level: Oriented to person,Oriented to place    Pain:  Pain Scale 1: Numeric (0 - 10)  Pain Intensity 1: 0       After treatment:   Patient left in no apparent distress in bed, Call bell within reach, Nursing notified and Caregiver / family present    COMMUNICATION/EDUCATION:   Patient and daughter were educated regarding purpose of SLP assessment, POC, diet recs and sw safety precautions. Patient demonstrated Good understanding as evidenced by verbal responsiveness.     The patient's plan of care including recommendations, planned interventions, and recommended diet changes were discussed with: Registered nurse and Physician. Patient/family have participated as able in goal setting and plan of care. Patient/family agree to work toward stated goals and plan of care.     Thank you for this referral.  Aramis Matamoros M.S. CCC-SLP  Time Calculation: 17 mins

## 2022-01-31 NOTE — PROGRESS NOTES
Problem: Pressure Injury - Risk of  Goal: *Prevention of pressure injury  Description: Document Zeyad Scale and appropriate interventions in the flowsheet. Outcome: Progressing Towards Goal  Note: Pressure Injury Interventions:  Sensory Interventions: Discuss PT/OT consult with provider,Keep linens dry and wrinkle-free,Turn and reposition approx. every two hours (pillows and wedges if needed)    Moisture Interventions: Limit adult briefs,Moisture barrier,Offer toileting Q_hr    Activity Interventions: Increase time out of bed,PT/OT evaluation    Mobility Interventions: HOB 30 degrees or less,PT/OT evaluation                          Problem: Falls - Risk of  Goal: *Absence of Falls  Description: Document Esther Fall Risk and appropriate interventions in the flowsheet.   Outcome: Progressing Towards Goal  Note: Fall Risk Interventions:       Mentation Interventions: Bed/chair exit alarm,More frequent rounding,Reorient patient,Toileting rounds    Medication Interventions: Bed/chair exit alarm,Patient to call before getting OOB,Teach patient to arise slowly    Elimination Interventions: Bed/chair exit alarm,Call light in reach,Toileting schedule/hourly rounds

## 2022-01-31 NOTE — PROGRESS NOTES
Comprehensive Nutrition Assessment    Type and Reason for Visit: Initial,Positive nutrition screen (BMI)    Nutrition Recommendations/Plan:   Continue diet (CL). Recommend ON(Clear Liquid)x3  Recommend Protein modular(Gelatein) x1  Recommend GI consult. Recommend obtaining weight via bed scale. Nutrition Assessment:  Admitted for ARF w/ hypoxia, abdominal pain and constipation. H/o frequent rehospitalization- EGD on 1/10 for upper GI bleed at [facility]. Pt seen asleep in room w/ daughter. Daughter reported Pt just turned by Nsg and went to sleep. PO intake reported as fair w/ intake prior to admission poor; not documented in I/Os. Fluids, foods high in fiber and enema were encouraged while at home for improvement in BMs- unsuccessful. Per MD, Pt suspected of aspirating- SLP consult was placed. SLP rec'd Clear Liquids at this time. Recommend ONS while on Clear liquids. Rec'd GI consult given continued constipation and daughter stating Pt reports attempt to void or defecate results in sharp pain in abdomen. Pt previously on 1L Fluid restritction; orders D/C'd- no h/o CHF documented. Labs:  K 3.0, CO2 36, BUN 23, Cr 0.49, Ca 7.3, Bili 1.7, AST 59, Alb 1.7, Phos 3.9, H/H 8.7/27.5. Monitor given risk for refeeding. Meds: PPI, midodrine, merem, miralax, amiodarone. Malnutrition Assessment:  Malnutrition Status:  Mild malnutrition    Context:  Acute illness     Findings of the 6 clinical characteristics of malnutrition:   Energy Intake:  1 - 75% or less of est energy req for 7 or more days  Weight Loss:  No significant weight loss (VWG=222 lbs in 12/21.)     Body Fat Loss:  1 - Mild body fat loss, Orbital,Buccal region   Muscle Mass Loss:  Unable to assess,    Fluid Accumulation:  No significant fluid accumulation,     Strength:  Not performed     Estimated Daily Nutrient Needs:  Energy (kcal): 1650 kcal/d (30 kcal/kg); Weight Used for Energy Requirements: Ideal  Protein (g): 66 gm/d (1.2 gm/kg);  Weight Used for Protein Requirements: Ideal  Fluid (ml/day): 1650 mL/d; Method Used for Fluid Requirements: 1 ml/kcal    Nutrition Related Findings:   No NFPE completed at this time given Pt asleep. No edema reported per MD. No N/V/D reported per daughter. Reported no BM since prior to admission; per I/Os small black formed stool on 1/30. Diet modified to Clear Liquids. Wounds:    Moisture associate skin damage (MASD- sacrum, groin.)       Current Nutrition Therapies:  ADULT DIET Clear Liquid  ADULT ORAL NUTRITION SUPPLEMENT Breakfast, Lunch, Dinner; Clear Liquid  ADULT ORAL NUTRITION SUPPLEMENT Breakfast, Lunch, Dinner; Protein Modular    Anthropometric Measures:  · Height:  5' 4\" (162.6 cm)  · Current Body Wt:  45.4 kg (100 lb)   · Admission Body Wt:       · Usual Body Wt:  47.6 kg (105 lb)     · Ideal Body Wt:  120 lbs:  83.3 %   · Adjusted Body Weight:   ; Weight Adjustment for: No adjustment   · Adjusted BMI:       · BMI Category:  Underweight (BMI less than 22) age over 72       Nutrition Diagnosis:   · Inadequate oral intake related to altered GI function,cognitive or neurological impairment as evidenced by NPO or clear liquid status due to medical condition,BMI,poor intake prior to admission,constipation,GI abnormality,lab values      Nutrition Interventions:   Food and/or Nutrient Delivery: Continue current diet,Start oral nutrition supplement  Nutrition Education and Counseling: No recommendations at this time  Coordination of Nutrition Care: Continue to monitor while inpatient,Speech therapy    Goals:  Meet >65% of EENs in 3-5 days. Gradual weight gain of 1-2 lbs/week. Improve labs/lytes to WNL. Improve skin integrity.        Nutrition Monitoring and Evaluation:   Behavioral-Environmental Outcomes: None identified  Food/Nutrient Intake Outcomes: Food and nutrient intake,Diet advancement/tolerance,Supplement intake  Physical Signs/Symptoms Outcomes: Biochemical data,Chewing or swallowing,Weight,GI status,Constipation,Meal time behavior    Discharge Planning: Too soon to determine     Electronically signed by Rosie Adame RD on 1/31/2022 at 2:23 PM    Contact: Andrew.

## 2022-01-31 NOTE — PROGRESS NOTES
Patient complaining of abdominal pain and constipation. Patient can not remember last BM. Patient refused a laxative or enema and also refused something for the abdominal pain. After patient had a small BM, she stated her abdomin felt a little better and patient then resting with eyes closed.

## 2022-01-31 NOTE — PROGRESS NOTES
PHYSICAL THERAPY EVALUATION  Patient: Loletta Favre (13 y.o. female)  Date: 1/31/2022  Primary Diagnosis: Acute respiratory failure with hypoxia (HCC) [J96.01]  Pleural effusion [J90]  Lung abscess [facility]) [J85.2]        Precautions:  Fall    ASSESSMENT  Pt is an 78 yo female admitted on 1/29/2022 for abdominal pain and contispation; chest CT showed acute/subacute right lower lobe PE. Pt is currently on 2L O2 via NC.    PMH: afib, gastric ulcer, covid 19, GIB, anemia, lung abscess, DVT/PE. Pt semi-supine upon PT/OT arrival, agreeable to evaluation. Pt A&O x self only; knew she was in a hospital but thought [facility]. Per pt report pt resides with daughter but unknown home environment, states she I with ADLs/IADLs, and has walker. Based on the objective data described below, the patient presents with generalized weakness, impaired functional mobility, impaired amb, impaired balance, decreased activity tolerance, poor command following, confusion, and increased letheragy. Pt noted to be soiled upon arrival, PT/OT assisted with toileting hygiene, chux, and linen change (see OT noted for details). Pt required mod to max A for bed mobility, mod to max Ax2 supine <> sit, max to total A sit <> stand, and max to total A for stand pivot transfers. Amb not attempted this session due to poor command following and sig assistance required for bed <> chair transfers. Pt did poor with session today with poor command following, confusion, c/o pain while seated in chair requiring increased cueing to remain in upright position. Pt will benefit from continued skilled PT to address above deficits and return to PLOF. Current PT DC recommendation SNF. Would benefit from clarification of PLOF.       Current Level of Function Impacting Discharge (mobility/balance): mod to total A    Other factors to consider for discharge: severity of deficits, confusion, poor command following      PLAN :  Recommendations and Planned Interventions: bed mobility training, transfer training, gait training, therapeutic exercises, neuromuscular re-education, patient and family training/education and therapeutic activities      Recommend with staff: bedpan for toileting     Frequency/Duration: Patient will be followed by physical therapy:  3-5x/week to address goals. Recommendation for discharge: (in order for the patient to meet his/her long term goals)  Krishan Belcher discharge recommendation:  Has been made in collaboration with the attending provider and/or case management    IF patient discharges home will need the following DME: to be determined (TBD)         SUBJECTIVE:   Patient complained of severe buttock pain while seated in chair. OBJECTIVE DATA SUMMARY:   HISTORY:    History reviewed. No pertinent past medical history. Past Surgical History:   Procedure Laterality Date    [place] EGD DELIVER THERMAL ENERGY SPHNCTR/CARDIA GERD         Home Situation  Home Environment: Private residence  One/Two Story Residence: One story  Living Alone: No  Support Systems: Child(mynor)  Patient Expects to be Discharged to[de-identified] Home  Current DME Used/Available at Home: Walker,Oxygen, portable    EXAMINATION/PRESENTATION/DECISION MAKING:   Critical Behavior:  Neurologic State: Alert  Orientation Level: Oriented to person,Disoriented to place,Disoriented to situation,Disoriented to time  Cognition: Impaired decision making,Poor safety awareness,Decreased command following     Hearing: Auditory  Auditory Impairment: Hard of hearing, bilateral  Skin:  Sig redness and macerated skin noted in buttock and sacral region  Edema: none noted  Range Of Motion:  AROM: Generally decreased, functional           PROM: Generally decreased, functional           Strength:    Strength: Generally decreased, functional (BLE grossly 3-/5)             Functional Mobility:  Bed Mobility:  Rolling:  Moderate assistance;Maximum assistance  Supine to Sit: Moderate assistance;Maximum assistance;Assist x2  Sit to Supine: Maximum assistance;Assist x2  Scooting: Moderate assistance;Maximum assistance  Transfers:  Sit to Stand: Maximum assistance  Stand to Sit: Maximum assistance  Stand Pivot Transfers: Maximum assistance                    Balance:   Sitting: Impaired; With support  Sitting - Static: Poor (constant support); Prop sitting  Sitting - Dynamic: Poor (constant support); Prop sitting  Standing: Impaired; With support  Standing - Static: Poor;Constant support  Standing - Dynamic : Poor;Constant support  Ambulation/Gait Training:              Gait Description (WDL): Exceptions to WDL          Therapeutic Exercises:   Not completed this session    Functional Measure:  [address] Short Form  How much difficulty does the patient currently have. .. Unable A Lot A Little None   1. Turning over in bed (including adjusting bedclothes, sheets and blankets)? [] 1   [x] 2   [] 3   [] 4   2. Sitting down on and standing up from a chair with arms ( e.g., wheelchair, bedside commode, etc.)   [] 1   [x] 2   [] 3   [] 4   3. Moving from lying on back to sitting on the side of the bed? [] 1   [x] 2   [] 3   [] 4          How much help from another person does the patient currently need. .. Total A Lot A Little None   4. Moving to and from a bed to a chair (including a wheelchair)? [] 1   [x] 2   [] 3   [] 4   5. Need to walk in hospital room? [] 1   [x] 2   [] 3   [] 4   6. Climbing 3-5 steps with a railing? [x] 1   [] 2   [] 3   [] 4   © 2007, Trustees of [address] [place] [address] [address] [address], under license to [employer]. All rights reserved     Score:  Initial: 11/24 Most Recent: X (Date: 1/31/22 )   Interpretation of Tool:  Represents activities that are increasingly more difficult (i.e. Bed mobility, Transfers, Gait).   Score 24 23 22-20 19-15 14-10 9-7 6   Modifier CH CI CJ CK CL CM CN         Physical Therapy Evaluation Charge Determination   History Examination Presentation Decision-Making   HIGH Complexity :3+ comorbidities / personal factors will impact the outcome/ POC  HIGH Complexity : 4+ Standardized tests and measures addressing body structure, function, activity limitation and / or participation in recreation  MEDIUM Complexity : Evolving with changing characteristics  Other outcome measures ampac 6  mod      Based on the above components, the patient evaluation is determined to be of the following complexity level: MEDIUM    Pain Rating:  Pt c/o severe buttock pain, no grimace noted, unable to quantify    Activity Tolerance:   Poor, desaturates with exertion and requires oxygen, requires rest breaks and pain limiting mobility     After treatment patient left in no apparent distress:   Supine in bed, Call bell within reach, Bed / chair alarm activated and Side rails x 3 and nsg updated. GOALS:    Problem: Mobility Impaired (Adult and Pediatric)  Goal: *Acute Goals and Plan of Care (Insert Text)  Description: Pt will be I with LE HEP in 7 days. Pt will perform bed mobility with min Ax1 in 7 days. Pt will perform transfers with min Ax1 in 7 days. Pt will amb 25-50 feet with LRAD safely with min Ax1 in 7 days. Outcome: Not Met       COMMUNICATION/EDUCATION:   The patients plan of care was discussed with: Occupational therapist, Registered nurse and Case management. Fall prevention education was provided and the patient/caregiver indicated understanding., Patient/family have participated as able in goal setting and plan of care. and Patient/family agree to work toward stated goals and plan of care. requires reinforcement. PT/OT sessions occurred together for increased safety of pt and clinician.      Thank you for this referral.  Dorota Reina, PT, DPT   Time Calculation: 36 mins

## 2022-01-31 NOTE — PROGRESS NOTES
Progress Note    Patient: Shemar Mom MRN: [id number]  SSN: [id number]    YOB: 1940  Age: 79 y.o. Sex: female      Admit Date: 1/29/2022    LOS: 2 days     Subjective:     Patient seen and examined. Patient is followed for atrial fibrillation. Patient has multiple co-morbidities. She is resting comfortably in the bed. Daughter at the bedside. States that her mother has not had a bowel movement in several days. No active chest pain this morning. Telemetry Review: No acute events noted in the past 24 hours. Sinus rhythm; no ectopy. Review of Symptoms:   Review of Systems   Constitutional: Negative. Cardiovascular: Negative for chest pain, dyspnea on exertion, irregular heartbeat and palpitations. Respiratory: Negative for cough, shortness of breath and wheezing. Gastrointestinal: Negative for abdominal pain, constipation, nausea and vomiting. Neurological: Negative for light-headedness. Objective:     Vitals:    01/31/22 0400 01/31/22 0450 01/31/22 0741 01/31/22 0743   BP:  106/70  97/75   Pulse: 80 80  89   Resp:  18  16   Temp:  97.8 °F (36.6 °C)  97.7 °F (36.5 °C)   SpO2:  98% 100% 100%   Weight:       Height:            Intake and Output:  Current Shift: No intake/output data recorded. Last three shifts: 01/29 1901 - 01/31 0700  In: 1000 [I.V.:1000]  Out: 1     Physical Exam:   . Cloteal Menjivar Physical Exam  Constitutional:       General: She is not in acute distress. Cardiovascular:      Rate and Rhythm: Normal rate and regular rhythm. Pulses: Normal pulses. Heart sounds: Normal heart sounds. Pulmonary:      Effort: Pulmonary effort is normal. No respiratory distress. Breath sounds: Normal breath sounds. No stridor. No wheezing, rhonchi or rales. Abdominal:      General: Abdomen is flat. Bowel sounds are normal. There is no distension. Palpations: Abdomen is soft. Tenderness: There is no abdominal tenderness. There is no rebound.    Skin:     General: Skin is warm and dry. Neurological:      General: No focal deficit present. Mental Status: She is alert and oriented to person, place, and time. Psychiatric:         Mood and Affect: Mood normal.         Behavior: Behavior normal.           Lab/Data Review: All lab results for the last 24 hours reviewed.          Current Facility-Administered Medications:     guaiFENesin ER (MUCINEX) tablet 600 mg, 600 mg, Oral, Q12H, Nalini Daniels MD, 600 mg at 01/31/22 1005    albuterol-ipratropium (DUO-NEB) 2.5 MG-0.5 MG/3 ML, 3 mL, Nebulization, Q6HWA RT, Michael Alaniz DO, 3 mL at 01/31/22 0741    polyethylene glycol (MIRALAX) packet 17 g, 17 g, Oral, DAILY, Reggie Loya MD, 17 g at 01/31/22 1006    meropenem (MERREM) 1 g in 0.9% sodium chloride 20 mL IV syringe, 1 g, IntraVENous, Q8H, Bonnie Clitnon MD, 1 g at 01/31/22 1005    amiodarone (CORDARONE) tablet 200 mg, 200 mg, Oral, DAILY, Bonnie Clinton MD, 200 mg at 01/31/22 1005    pantoprazole (PROTONIX) tablet 40 mg, 40 mg, Oral, BID, Teofilo Santana MD, 40 mg at 01/31/22 1006    midodrine (PROAMATINE) tablet 5 mg, 5 mg, Oral, BID WITH MEALS, Bonnie Clinton MD, 5 mg at 01/31/22 1005    apixaban (ELIQUIS) tablet 5 mg, 5 mg, Oral, BID, Teofilo Santana MD, 5 mg at 01/31/22 1006    sodium chloride (NS) flush 5-40 mL, 5-40 mL, IntraVENous, Q8H, Bonnie Clinton MD, 10 mL at 01/31/22 0707    sodium chloride (NS) flush 5-40 mL, 5-40 mL, IntraVENous, PRN, Teofilo Santana MD    acetaminophen (TYLENOL) tablet 650 mg, 650 mg, Oral, Q6H PRN **OR** acetaminophen (TYLENOL) suppository 650 mg, 650 mg, Rectal, Q6H PRN, Bonnie Haile MD    polyethylene glycol (MIRALAX) packet 17 g, 17 g, Oral, DAILY PRN, Bonnie Haile MD    ondansetron (ZOFRAN ODT) tablet 4 mg, 4 mg, Oral, Q8H PRN **OR** ondansetron (ZOFRAN) injection 4 mg, 4 mg, IntraVENous, Q6H PRN, Teofilo Santana MD      Assessment:     Principal Problem:    Acute respiratory failure with hypoxia ([facility] (1/29/2022)    Active Problems:    Pulmonary emboli ([address] (1/29/2022)      Lung abscess ([address] (1/29/2022)      Deep vein thrombosis (DVT) of lower extremity (HCC) (1/29/2022)      Atrial fibrillation ([address] (1/29/2022)      Constipation (1/29/2022)      GIB (gastrointestinal bleeding) (1/29/2022)      Overview: Recent      Chronic gastric ulcer (1/29/2022)      Overview: Recent      COVID-19 (1/29/2022)      Overview: Dec 2021      Anemia (1/29/2022)      Hypokalemia (1/29/2022)      Bilateral pleural effusion (1/29/2022)      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 Bryce Walton and our recommendations are as follows:     1. Atrial fibrillation:   - Rate controlled  - continue amiodarone and Eliquis  - continue telemetry    2. History of PE  - continue Eliquis    Thank you for involving us in the care of this patient. Please do not hesitate to call if additional questions arise. If after hours please call [phone]. Signed By: Tia Christie NP     January 31, 2022        Dr. Rogelio Churchill Cardiologist    [facility]  [address] [place] [address].    [address] [place] [address], [address] [place] [address]  [phone]

## 2022-01-31 NOTE — PROGRESS NOTES
OCCUPATIONAL THERAPY EVALUATION  Patient: Loletta Favre (72 y.o. female)  Date: 1/31/2022  Primary Diagnosis: Acute respiratory failure with hypoxia (HCC) [J96.01]  Pleural effusion [J90]  Lung abscess ([address] [J85.2]        Precautions:   Fall    ASSESSMENT  Pt is an 80 y/o F with PMH of afib, gastric ulcer, COVID-19, GIB, anemia, lung abscess, DVT/PE presenting to [facility] with c/o abdominal pain and constipation; CT chest showed acute/subacute R lower lobe PE. Pt admitted 1/29/22  and being treated for acute respiratory failure with hypoxia, pleural effusion, lung abscess, PE. Pt received semi-supine in bed upon arrival, AXO to self only, confused yet agreeable to OT/PT evaluations at this time. Unclear of reliability of PLOF '2 pt's current cognitive status, however per pt report, pt residing with spouse/daughter was IND with ADLs and ambulatory at [facility]. Unclear of DME owned at this time. Would benefit from clarification of PLOF. Based on current observations, pt presents with deficits in generalized strength/AROM, bed mobility, static/dynamic sitting balance, static/dynamic standing balance, functional activity tolerance, cognition/confusion, decreased safety awareness, and pain impacting overall performance of ADLs and functional transfers/mobility. Pt currently requires mod-max A for rolling, mod-max A x2 for bed mobility/supine>sit with fair-poor sitting balance/poor safety awareness in sitting with UE support on bed rail. Pt requires max A to don clean gown and simulated total A adjusting socks in sitting. STS attempted max A x2 to/from EOB, however pt unable to fully extend with poor standing balance noted; PT therefore assisted with SPT (see PT note for details) max A to chair with total A from OT for jose g-care/bowel hygiene as pad noted to be soiled (total A for application of zinc paste).  PT/OT provided clean linen and transferred pt back to bed max A x2; setup/SBA for basic grooming (washing face) in long sitting at end of evaluation. Overall, pt tolerates session fair with c/o pain to buttocks, unable to quantify at this time. Pt would benefit from continued skilled OT services to address current impairments and improve IND and safety with self cares and functional transfers/mobility. At this time, OT recommending d/c to SNF once medically appropriate. Other factors to consider for discharge: family support, DME, time since onset, severity of deficits      Patient will benefit from skilled therapy intervention to address the above noted impairments. PLAN :  Recommendations and Planned Interventions: self care training, functional mobility training, therapeutic exercise, balance training, therapeutic activities, cognitive retraining, endurance activities, patient education, and family training/education    Frequency/Duration: Patient will be followed by occupational therapy:  3-5x/week to address goals. Recommendation for discharge: (in order for the patient to meet his/her long term goals)  Krishan Driscoll    This discharge recommendation:  Has been made in collaboration with the attending provider and/or case management       SUBJECTIVE:   Patient stated my butt hurts.     OBJECTIVE DATA SUMMARY:   HISTORY:   History reviewed. No pertinent past medical history. Past Surgical History:   Procedure Laterality Date    [place] EGD DELIVER THERMAL ENERGY SPHNCTR/CARDIA GERD         Expanded or extensive additional review of patient history:     Home Situation  Home Environment: Private residence  One/Two Story Residence: One story  Living Alone: No  Support Systems: Child(mynor)  Patient Expects to be Discharged to[de-identified] Home  Current DME Used/Available at Home: Walker,Oxygen, portable    EXAMINATION OF PERFORMANCE DEFICITS:  Cognitive/Behavioral Status:  Neurologic State: Alert  Orientation Level: Oriented to person;Disoriented to place; Disoriented to situation;Disoriented to time  Cognition: Impaired decision making;Poor safety awareness;Decreased command following    Skin: redness noted around buttocks/gluteal folds (zinc paste applied by OT following jose g care/bowel hygiene)      Hearing: Auditory  Auditory Impairment: Hard of hearing, bilateral    Range of Motion:    AROM: Generally decreased, functional  PROM: Generally decreased, functional                      Strength:    Strength: Generally decreased, functional                Coordination:  Coordination: Generally decreased, functional  Fine Motor Skills-Upper: Comment (grossly decreased, functional)         Balance:  Sitting: Impaired; With support  Sitting - Static: Poor (constant support); Prop sitting  Sitting - Dynamic: Poor (constant support); Prop sitting  Standing: Impaired; With support  Standing - Static: Poor;Constant support  Standing - Dynamic : Poor;Constant support    Functional Mobility and Transfers for ADLs:  Bed Mobility:  Rolling: Moderate assistance;Maximum assistance  Supine to Sit: Moderate assistance;Maximum assistance;Assist x2  Sit to Supine: Maximum assistance;Assist x2  Scooting: Moderate assistance;Maximum assistance    Transfers:  Sit to Stand: Maximum assistance  Stand to Sit: Maximum assistance      ADL Intervention and task modifications:       Grooming  Grooming Assistance: Set-up; Stand-by assistance  Position Performed: Long sitting on bed  Washing Face: Set-up; Stand-by assistance              Upper Body [address]: Maximum assistance    Lower Body Dressing Assistance  Socks: Total assistance (dependent) (Simulated)  Position Performed: Seated edge of bed    Toileting  Toileting Assistance: Total assistance(dependent)  Bladder Hygiene: Total assistance (dependent)  Bowel Hygiene: Total assistance (dependent)         Therapeutic Exercise:  Pt would benefit from UE HEP to improve overall UE AROM/strength and can be further educated in next treatment session.      Functional Measure:    Jose [facility] \"6 Clicks\"                                                       Daily Activity Inpatient Short Form  How much help from another person does the patient currently need. .. Total; A Lot A Little None   1. Putting on and taking off regular lower body clothing? [x]  1 []  2 []  3 []  4   2. Bathing (including washing, rinsing, drying)? []  1 [x]  2 []  3 []  4   3. Toileting, which includes using toilet, bedpan or urinal? [x] 1 []  2 []  3 []  4   4. Putting on and taking off regular upper body clothing? []  1 [x]  2 []  3 []  4   5. Taking care of personal grooming such as brushing teeth? []  1 []  2 [x]  3 []  4   6. Eating meals? []  1 []  2 [x]  3 []  4   © 2007, Trustees of [facility] [employer], under license to [employer]. All rights reserved     Score: 12/24     Interpretation of Tool:  Represents clinically-significant functional categories (i.e. Activities of daily living). Percentage of Impairment CH    0%   CI    1-19% CJ    20-39% CK    40-59% CL    60-79% CM    80-99% CN     100%   [facility]  Score 6-24 24 23 20-22 15-19 10-14 7-9 6         Occupational Therapy Evaluation Charge Determination   History Examination Decision-Making   LOW Complexity : Brief history review  MEDIUM Complexity : 3-5 performance deficits relating to physical, cognitive , or psychosocial skils that result in activity limitations and / or participation restrictions MEDIUM Complexity : Patient may present with comorbidities that affect occupational performnce.  Miniml to moderate modification of tasks or assistance (eg, physical or verbal ) with assesment(s) is necessary to enable patient to complete evaluation       Based on the above components, the patient evaluation is determined to be of the following complexity level: LOW   Pain Rating:  Pt c/o pain to buttocks, unable to quantify at this time     Activity Tolerance:   Fair    After treatment patient left in no apparent distress:    Supine in bed, Call bell within reach, Bed / chair alarm activated, and Side rails x 3    COMMUNICATION/EDUCATION:   The patients plan of care was discussed with: Physical therapist and Registered nurse. Patient/family agree to work toward stated goals and plan of care. This patients plan of care is appropriate for delegation to [facility]. OT/PT sessions occurred together for increased patient and clinician safety as pt with decreased activity tolerance and requires A of 2 for mobility at this time.      Thank you for this referral.  Bella Arreguin  Time Calculation: 25 mins   Problem: Self Care Deficits Care Plan (Adult)  Goal: *Acute Goals and Plan of Care (Insert Text)  Description: Pt will be Mod I sup <> sit in prep for EOB ADLs  Pt will be Mod I grooming standing sink side LRAD  Pt will be Mod I LE dressing sitting EOB/long sit  Pt will be Mod I sit <>  prep for toileting LRAD  Pt will be Mod I toileting/toilet transfer/cloth mgmt LRAD  Pt will be IND following UE HEP in prep for self care tasks    Outcome: Not Met

## 2022-01-31 NOTE — PROGRESS NOTES
RN called Dr. Arthur Ross for consult. However, MD did not answer at this time and mailbox was full.

## 2022-01-31 NOTE — PROGRESS NOTES
Reason for Admission:   Acute Respiratory Failure                    RUR Score:   15%               PCP: First and Last name:   Virginia Benson DO     Name of Practice:    Are you a current patient: Yes/No:    Approximate date of last visit:    Can you participate in a virtual visit if needed:     Do you (patient/family) have any concerns for transition/discharge? High level of care needs, IV ABX              Plan for utilizing home health:   Was current with [facility], unsure of DCP at this time    Current Advanced Directive/Advance Care Plan:  Full Code      Healthcare Decision Maker:   Click here to complete [address] including selection of the Healthcare Decision Maker Relationship (ie \"Primary\")              Transition of Care Plan:      Unsure at this time. CM spoke with patient's daughter, Latoya Longoria [phone], to complete DCP assessment. Ms. Donna Vasquez reports that patient lives in a house, one of her older daughters lives with her and has for approx a year now, however she works and is not there during the day, during the day Ms. Donna Vasquez and her brother take turns caring for the patient. Patient requires assistance with all ADL care. Patient was recently at [facility] and d/c to home with HH ([facility]) and IV ABX ([facility]). Ms. Donna Vasquez states that the IV ABX has been tiring and she has felt that caring for patient on her own has become tiring and difficult to continue. CM discussed PT/OT recc for SNF, she reported that their father had a horrible experience at a SNF and the family does not wish for the patient to go to SNF however will consider. CM encouraged Ms. Donna Vasquez to speak with her siblings and the patient regarding DCP and CM will follow up tomorrow, she verbalized understanding. CM continues to follow, DCP at this time is undetermined.

## 2022-01-31 NOTE — PROGRESS NOTES
Patient complaints of inablilty to have bowel movement. Miralax given this morning with no results. Dr. Ijeoma Salguero gave verbal orders to give 30ml of lactulose if patient still unable to have bowel movement. RN gave lactulose at 1420. Will continue to assess bowel movement.

## 2022-01-31 NOTE — PROGRESS NOTES
Hospitalist Progress Note    Subjective:   Daily Progress Note: 1/31/2022 4:17 PM    Hospital Course/Admission: \"Marichuy Tsai is a 79 y. o. female who presents to ER with abd pain/constipation. However when EMS found patient she was hypoxic and required O2 and brought in on CPAP; in ER she was continued on NC. Patient is elderly and can give ltd hx. Most hx obtained from daughter Samira Pena) at bedside. Patient has very complex acute and subacute medical course with Covid in December 2021 and most recently hospitalized at [facility] and treated for Lung Abscess, DVT/PE with subsequent GIB (requiring EGD cauterization of duodenal bulb ulceration) and then resumption of Eliquis, At Fib, and now at on home IV Merrem 1g q8 with RUE PICC. Has not been active, eating well; no BM for many days. Patient has [facility]. She has received Covid vaccines. \"    Subjective: Nausea/Abd pain. Still no BM. Daughter Hester Heimlich) is at bedside. Discussed findings and plans. Case mgmt consulted for options to assist patient's daughter with care giving.       Current Facility-Administered Medications   Medication Dose Route Frequency    lactulose (CHRONULAC) 10 gram/15 mL solution 30 mL  30 mL Oral DAILY PRN    guaiFENesin ER (MUCINEX) tablet 600 mg  600 mg Oral Q12H    albuterol-ipratropium (DUO-NEB) 2.5 MG-0.5 MG/3 ML  3 mL Nebulization Q6HWA RT    polyethylene glycol (MIRALAX) packet 17 g  17 g Oral DAILY    meropenem (MERREM) 1 g in 0.9% sodium chloride 20 mL IV syringe  1 g IntraVENous Q8H    amiodarone (CORDARONE) tablet 200 mg  200 mg Oral DAILY    pantoprazole (PROTONIX) tablet 40 mg  40 mg Oral BID    midodrine (PROAMATINE) tablet 5 mg  5 mg Oral BID WITH MEALS    apixaban (ELIQUIS) tablet 5 mg  5 mg Oral BID    sodium chloride (NS) flush 5-40 mL  5-40 mL IntraVENous Q8H    sodium chloride (NS) flush 5-40 mL  5-40 mL IntraVENous PRN    acetaminophen (TYLENOL) tablet 650 mg  650 mg Oral Q6H PRN    Or    acetaminophen (TYLENOL) suppository 650 mg  650 mg Rectal Q6H PRN    polyethylene glycol (MIRALAX) packet 17 g  17 g Oral DAILY PRN    ondansetron (ZOFRAN ODT) tablet 4 mg  4 mg Oral Q8H PRN    Or    ondansetron (ZOFRAN) injection 4 mg  4 mg IntraVENous Q6H PRN        Review of Systems: ltd from patient. Constitutional: No - fever, chills    HEENT: No - sore throat, sinus pressure, ear pain    Respiratory: congestion/cough, sob; no wheeze, hemoptysis    Cardiovascular: No - chest pain, palpitations, extremity edema    Gastrointestinal: No - n/v/d/bleeding    Genitourinary: No - dysuria, hematuria    Neurological: No - headache, focal weakness    Skin: No - new rash    M/S: No - joint pain, back pain      Objective:     Visit Vitals  BP 95/63 (BP 1 Location: Left upper arm)   Pulse 66   Temp 97.9 °F (36.6 °C)   Resp 20   Ht 5' 4\" (1.626 m)   Wt 45.4 kg (100 lb)   SpO2 94%   BMI 17.16 kg/m²    O2 Flow Rate (L/min): 2 l/min O2 Device: Nasal cannula    Temp (24hrs), Av.7 °F (36.5 °C), Min:97.3 °F (36.3 °C), Max:97.9 °F (36.6 °C)      No intake/output data recorded.  1901 -  0700  In: 1000 [I.V.:1000]  Out: 1     PHYSICAL EXAM    Constitutional: NAD, Awake; elderly frail    Neck: Supple     Cardiovascular: S1S2, no murmur    Respiratory:  Clear breath sounds bilaterally with no wheezes, rales, or rhonchi. GI: Soft, NT; no rigidity; + bowel sounds; no guarding/rigidity    : voiding, clear yellow urine    Musculoskeletal: Moving all extremities spontaneously; no overt injury    Neurological:  AxOx2; No new focal weakness; No tremors    Psychiatric: Mood appears appropriate     Skin: No new rash    Vascular: Palpable pulses;  No edema      Data Review    Recent Results (from the past 24 hour(s))   HGB & HCT    Collection Time: 22  8:42 PM   Result Value Ref Range    HGB 8.7 (L) 11.5 - 16.0 g/dL    HCT 27.5 (L) 35.0 - 29.4 %   METABOLIC PANEL, COMPREHENSIVE    Collection Time: 22  1:16 PM   Result Value Ref Range    Sodium 140 136 - 145 mmol/L    Potassium 3.6 3.5 - 5.1 mmol/L    Chloride 105 97 - 108 mmol/L    CO2 33 (H) 21 - 32 mmol/L    Anion gap 2 (L) 5 - 15 mmol/L    Glucose 113 (H) 65 - 100 mg/dL    BUN 24 (H) 6 - 20 mg/dL    Creatinine 0.56 0.55 - 1.02 mg/dL    BUN/Creatinine ratio 43 (H) 12 - 20      GFR est AA >60 >60 ml/min/1.73m2    GFR est non-AA >60 >60 ml/min/1.73m2    Calcium 7.8 (L) 8.5 - 10.1 mg/dL    Bilirubin, total 1.9 (H) 0.2 - 1.0 mg/dL    AST (SGOT) 76 (H) 15 - 37 U/L    ALT (SGPT) 80 (H) 12 - 78 U/L    Alk. phosphatase 97 45 - 117 U/L    Protein, total 4.5 (L) 6.4 - 8.2 g/dL    Albumin 1.8 (L) 3.5 - 5.0 g/dL    Globulin 2.7 2.0 - 4.0 g/dL    A-G Ratio 0.7 (L) 1.1 - 2.2     CBC WITH AUTOMATED DIFF    Collection Time: 01/31/22  1:16 PM   Result Value Ref Range    WBC 1.0 (L) 3.6 - 11.0 K/uL    RBC 2.86 (L) 3.80 - 5.20 M/uL    HGB 8.9 (L) 11.5 - 16.0 g/dL    HCT 28.3 (L) 35.0 - 47.0 %    MCV 99.0 80.0 - 99.0 FL    MCH 31.1 26.0 - 34.0 PG    MCHC 31.4 30.0 - 36.5 g/dL    RDW 19.8 (H) 11.5 - 14.5 %    PLATELET 000 443 - 134 K/uL    MPV 11.2 8.9 - 12.9 FL    NRBC 0.0 0.0  WBC    ABSOLUTE NRBC 0.00 0.00 - 0.01 K/uL    NEUTROPHILS 23 (L) 32 - 75 %    LYMPHOCYTES 47 12 - 49 %    MONOCYTES 11 5 - 13 %    EOSINOPHILS 18 (H) 0 - 7 %    BASOPHILS 0 0 - 1 %    IMMATURE GRANULOCYTES 1 (H) 0 - 0.5 %    ABS. NEUTROPHILS 0.2 (L) 1.8 - 8.0 K/UL    ABS. LYMPHOCYTES 0.5 (L) 0.8 - 3.5 K/UL    ABS. MONOCYTES 0.1 0.0 - 1.0 K/UL    ABS. EOSINOPHILS 0.2 0.0 - 0.4 K/UL    ABS. BASOPHILS 0.0 0.0 - 0.1 K/UL    ABS. IMM. GRANS. 0.0 0.00 - 0.04 K/UL    DF AUTOMATED         XR CHEST PORT   Final Result   Comparison 1/29/2022. Interval development of mild pulmonary edema. CTA CHEST W OR W WO CONT   Final Result   Positive for pulmonary embolus, right lower lobe, acute versus subacute; the   report communicated to Samira Fish, 1451 hours. Cardiomegaly with bilateral   pleural effusions. Atherosclerosis. Hiatus hernia with partially intrathoracic   stomach. Distended gallbladder with sludge. Moderate rectal distention by stool. Degenerative changes of the spine. Convex leftward scoliosis, centered at the   L1-L2 level. CT ABD PELV W CONT   Final Result   Positive for pulmonary embolus, right lower lobe, acute versus subacute; the   report communicated to Roxanna Felton, 1451 hours. Cardiomegaly with bilateral   pleural effusions. Atherosclerosis. Hiatus hernia with partially intrathoracic   stomach. Distended gallbladder with sludge. Moderate rectal distention by stool. Degenerative changes of the spine. Convex leftward scoliosis, centered at the   L1-L2 level. XR CHEST PORT   Final Result   Comparison 6/22/2020. Central line position as above. Right hemithorax pleural fluid/pleural thickening. Additional chronic findings   as above. Principal Problem:    Acute respiratory failure with hypoxia ([address] (1/29/2022)    Active Problems:    Pulmonary emboli ([address] (1/29/2022)      Lung abscess ([address] (1/29/2022)      Deep vein thrombosis (DVT) of lower extremity (HCC) (1/29/2022)      Atrial fibrillation ([address] (1/29/2022)      Constipation (1/29/2022)      GIB (gastrointestinal bleeding) (1/29/2022)      Overview: Recent      Chronic gastric ulcer (1/29/2022)      Overview: Recent      COVID-19 (1/29/2022)      Overview: Dec 2021      Anemia (1/29/2022)      Hypokalemia (1/29/2022)      Bilateral pleural effusion (1/29/2022)      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     Merrem to be continued through 2/4/22 per ID    DVT Prophylaxis: On AC/Eliquis  Code Status: Full Code  POA: Daughter Brenda Mccoy)    Care Plan discussed with: Daughter, Case Mgmt, Nursing  _______________________________________________________________    [place], MD

## 2022-01-31 NOTE — PROGRESS NOTES
Skin assessment completed with [address]. Patient has moisture associated excoriation to her sacrum and groin. She also has numerous, scattered skin tears and scabs to her bilateral upper extremities.

## 2022-02-01 LAB
ALBUMIN SERPL-MCNC: 1.7 G/DL (ref 3.5–5)
ALBUMIN/GLOB SERPL: 0.7 {RATIO} (ref 1.1–2.2)
ALP SERPL-CCNC: 97 U/L (ref 45–117)
ALT SERPL-CCNC: 73 U/L (ref 12–78)
ANION GAP SERPL CALC-SCNC: 3 MMOL/L (ref 5–15)
AST SERPL W P-5'-P-CCNC: 57 U/L (ref 15–37)
BASOPHILS # BLD: 0 K/UL (ref 0–0.1)
BASOPHILS NFR BLD: 0 % (ref 0–1)
BILIRUB SERPL-MCNC: 2 MG/DL (ref 0.2–1)
BUN SERPL-MCNC: 23 MG/DL (ref 6–20)
BUN/CREAT SERPL: 53 (ref 12–20)
CA-I BLD-MCNC: 7.7 MG/DL (ref 8.5–10.1)
CHLORIDE SERPL-SCNC: 106 MMOL/L (ref 97–108)
CO2 SERPL-SCNC: 34 MMOL/L (ref 21–32)
CREAT SERPL-MCNC: 0.43 MG/DL (ref 0.55–1.02)
DIFFERENTIAL METHOD BLD: ABNORMAL
EOSINOPHIL # BLD: 0.1 K/UL (ref 0–0.4)
EOSINOPHIL NFR BLD: 24 % (ref 0–7)
ERYTHROCYTE [DISTWIDTH] IN BLOOD BY AUTOMATED COUNT: 19.7 % (ref 11.5–14.5)
FOLATE SERPL-MCNC: 5.4 NG/ML (ref 5–21)
GLOBULIN SER CALC-MCNC: 2.5 G/DL (ref 2–4)
GLUCOSE SERPL-MCNC: 88 MG/DL (ref 65–100)
HCT VFR BLD AUTO: 28.4 % (ref 35–47)
HGB BLD-MCNC: 8.8 G/DL (ref 11.5–16)
IMM GRANULOCYTES # BLD AUTO: 0 K/UL
IMM GRANULOCYTES NFR BLD AUTO: 0 %
LDH SERPL L TO P-CCNC: 289 U/L (ref 81–246)
LYMPHOCYTES # BLD: 0.4 K/UL (ref 0.8–3.5)
LYMPHOCYTES NFR BLD: 59 % (ref 12–49)
MCH RBC QN AUTO: 30.7 PG (ref 26–34)
MCHC RBC AUTO-ENTMCNC: 31 G/DL (ref 30–36.5)
MCV RBC AUTO: 99 FL (ref 80–99)
MONOCYTES # BLD: 0.1 K/UL (ref 0–1)
MONOCYTES NFR BLD: 12 % (ref 5–13)
NEUTS SEG # BLD: 0 K/UL (ref 1.8–8)
NEUTS SEG NFR BLD: 5 % (ref 32–75)
NRBC # BLD: 0 K/UL (ref 0–0.01)
NRBC BLD-RTO: 0 PER 100 WBC
PLATELET # BLD AUTO: 218 K/UL (ref 150–400)
PMV BLD AUTO: 11.1 FL (ref 8.9–12.9)
POTASSIUM SERPL-SCNC: 3.3 MMOL/L (ref 3.5–5.1)
PROT SERPL-MCNC: 4.2 G/DL (ref 6.4–8.2)
RBC # BLD AUTO: 2.87 M/UL (ref 3.8–5.2)
RBC MORPH BLD: ABNORMAL
RBC MORPH BLD: ABNORMAL
RETICS # AUTO: 0.07 M/UL (ref 0.02–0.08)
RETICS/RBC NFR AUTO: 2.4 % (ref 0.7–2.1)
SODIUM SERPL-SCNC: 143 MMOL/L (ref 136–145)
TEST DESCRIPTION:,ATST: NORMAL
TSH SERPL DL<=0.05 MIU/L-ACNC: 0.63 UIU/ML (ref 0.36–3.74)
VIT B12 SERPL-MCNC: 651 PG/ML (ref 193–986)
WBC # BLD AUTO: 0.6 K/UL (ref 3.6–11)

## 2022-02-01 PROCEDURE — 74011000250 HC RX REV CODE- 250: Performed by: INTERNAL MEDICINE

## 2022-02-01 PROCEDURE — 93005 ELECTROCARDIOGRAM TRACING: CPT

## 2022-02-01 PROCEDURE — 85025 COMPLETE CBC W/AUTO DIFF WBC: CPT

## 2022-02-01 PROCEDURE — 74011250636 HC RX REV CODE- 250/636: Performed by: INTERNAL MEDICINE

## 2022-02-01 PROCEDURE — 85045 AUTOMATED RETICULOCYTE COUNT: CPT

## 2022-02-01 PROCEDURE — 74011250637 HC RX REV CODE- 250/637: Performed by: INTERNAL MEDICINE

## 2022-02-01 PROCEDURE — 74011250637 HC RX REV CODE- 250/637: Performed by: STUDENT IN AN ORGANIZED HEALTH CARE EDUCATION/TRAINING PROGRAM

## 2022-02-01 PROCEDURE — 80053 COMPREHEN METABOLIC PANEL: CPT

## 2022-02-01 PROCEDURE — 94760 N-INVAS EAR/PLS OXIMETRY 1: CPT

## 2022-02-01 PROCEDURE — 94640 AIRWAY INHALATION TREATMENT: CPT

## 2022-02-01 PROCEDURE — 65270000029 HC RM PRIVATE

## 2022-02-01 PROCEDURE — 77010033678 HC OXYGEN DAILY

## 2022-02-01 PROCEDURE — 36415 COLL VENOUS BLD VENIPUNCTURE: CPT

## 2022-02-01 PROCEDURE — 82607 VITAMIN B-12: CPT

## 2022-02-01 PROCEDURE — 84443 ASSAY THYROID STIM HORMONE: CPT

## 2022-02-01 PROCEDURE — 83615 LACTATE (LD) (LDH) ENZYME: CPT

## 2022-02-01 RX ADMIN — AMIODARONE HYDROCHLORIDE 200 MG: 200 TABLET ORAL at 10:17

## 2022-02-01 RX ADMIN — POLYETHYLENE GLYCOL 3350 17 G: 17 POWDER, FOR SOLUTION ORAL at 10:18

## 2022-02-01 RX ADMIN — SODIUM CHLORIDE, PRESERVATIVE FREE 10 ML: 5 INJECTION INTRAVENOUS at 05:53

## 2022-02-01 RX ADMIN — MIDODRINE HYDROCHLORIDE 5 MG: 5 TABLET ORAL at 17:57

## 2022-02-01 RX ADMIN — APIXABAN 5 MG: 5 TABLET, FILM COATED ORAL at 10:17

## 2022-02-01 RX ADMIN — SODIUM CHLORIDE, PRESERVATIVE FREE 10 ML: 5 INJECTION INTRAVENOUS at 16:55

## 2022-02-01 RX ADMIN — MIDODRINE HYDROCHLORIDE 5 MG: 5 TABLET ORAL at 10:17

## 2022-02-01 RX ADMIN — SODIUM CHLORIDE, PRESERVATIVE FREE 10 ML: 5 INJECTION INTRAVENOUS at 22:38

## 2022-02-01 RX ADMIN — TBO-FILGRASTIM 300 MCG: 300 INJECTION, SOLUTION SUBCUTANEOUS at 16:51

## 2022-02-01 RX ADMIN — PANTOPRAZOLE SODIUM 40 MG: 40 TABLET, DELAYED RELEASE ORAL at 10:17

## 2022-02-01 RX ADMIN — SODIUM PHOSPHATE, DIBASIC AND SODIUM PHOSPHATE, MONOBASIC 1 ENEMA: 7; 19 ENEMA RECTAL at 17:57

## 2022-02-01 RX ADMIN — GUAIFENESIN 600 MG: 600 TABLET, EXTENDED RELEASE ORAL at 10:17

## 2022-02-01 RX ADMIN — SODIUM CHLORIDE, PRESERVATIVE FREE 1 G: 5 INJECTION INTRAVENOUS at 10:16

## 2022-02-01 RX ADMIN — GUAIFENESIN 600 MG: 600 TABLET, EXTENDED RELEASE ORAL at 22:38

## 2022-02-01 RX ADMIN — SODIUM CHLORIDE, PRESERVATIVE FREE 1 G: 5 INJECTION INTRAVENOUS at 01:02

## 2022-02-01 RX ADMIN — IPRATROPIUM BROMIDE AND ALBUTEROL SULFATE 3 ML: .5; 2.5 SOLUTION RESPIRATORY (INHALATION) at 09:22

## 2022-02-01 RX ADMIN — IPRATROPIUM BROMIDE AND ALBUTEROL SULFATE 3 ML: .5; 2.5 SOLUTION RESPIRATORY (INHALATION) at 20:40

## 2022-02-01 RX ADMIN — APIXABAN 5 MG: 5 TABLET, FILM COATED ORAL at 22:38

## 2022-02-01 RX ADMIN — IPRATROPIUM BROMIDE AND ALBUTEROL SULFATE 3 ML: .5; 2.5 SOLUTION RESPIRATORY (INHALATION) at 14:19

## 2022-02-01 NOTE — PROGRESS NOTES
Infectious Diseases Progress Note  Mir Marylen Reap MD  [phone]    Date:2022       Room:[facility]  Patient Libby Perkins     YOB: 1940     Age:81 y.o. 81F with past medical history of hypertension hyperlipidemia CVA. Reportedly positive for coronavirus the first week of 2021.     22 presents to [facility] with altered mental status. During the ED course was found to have an infiltrate on chest imaging, and started on broad spectrum antibiotics. Admitted to hospital and improved during the hospital course with meropenem. Hospital course further complicated by pulmonary embolism. 22 discharged from hospital on oral anticoagulation.     22 returns to hospital for acute hypoxic respiratory failure. CT reveals new development of Cavitary lesion of the inferior lateral right middle lobe. Admitted to hospital and I have been asked to see her in consultation.     1/10/22 underwent EGD with Dr Estela Mckenzie:   Large duodenal bulb ulcer - injected and cauterized  Hiatal hernia  Recommendations:   PPI continuous infusion  NPO, IVF     For the cavitary pneumonia I had her on empiric meropenem with an anticipated stop date of 22.  22 [facility]     22 [facility] to [facility] because of lower abdominal pains. Reports constipation for the past few days. No improvement with enemas at home. Admitted to hospital for stabilization and further workup of her condition. Subjective    Subjective:  Symptoms:  Stable. Review of Systems   All other systems reviewed and are negative.     Objective         Vitals Last 24 Hours:  TEMPERATURE:  Temp  Av.7 °F (36.5 °C)  Min: 97.3 °F (36.3 °C)  Max: 98.3 °F (36.8 °C)  RESPIRATIONS RANGE: Resp  Av.7  Min: 16  Max: 20  PULSE OXIMETRY RANGE: SpO2  Av.7 %  Min: 81 %  Max: 100 %  PULSE RANGE: Pulse  Av.1  Min: 66  Max: 98  BLOOD PRESSURE RANGE: Systolic (67KQX), UYE:491 , Min:90 , PLI:553   ; Diastolic (24hrs), Av, Min:63, Max:76    I/O (24Hr): No intake or output data in the 24 hours ending 22 1126  Objective:  General Appearance:  Comfortable. Vital signs: (most recent): Blood pressure 97/70, pulse 97, temperature 97.6 °F (36.4 °C), resp. rate 18, height 5' 4\" (1.626 m), weight 44.2 kg (97 lb 7.1 oz), SpO2 99 %. Vital signs are normal.    HEENT: Normal HEENT exam.    Lungs:  Normal effort and normal respiratory rate. Heart: Normal rate. Abdomen: Abdomen is soft. Neurological: Patient is alert. Skin:  Warm and dry. Labs/Imaging/Diagnostics    Labs:  CBC:  Recent Labs     22  0722  1316 22  0509 22  0509   WBC 0.6* 1.0*  --   --  2.1*   RBC 2.87* 2.86*  --   --  2.37*   HGB 8.8* 8.9* 8.7*   < > 7.4*   HCT 28.4* 28.3* 27.5*   < > 23.4*   MCV 99.0 99.0  --   --  98.7   RDW 19.7* 19.8*  --   --  19.8*    211  --   --  147*    < > = values in this interval not displayed. CHEMISTRIES:  Recent Labs     22  0722  13122  0509    140 141   K 3.3* 3.6 3.0*    105 105   CO2 34* 33* 36*   BUN 23* 24* 23*   CA 7.7* 7.8* 7.3*   PT/INR:  Recent Labs     22  1201   INR 1.5*     APTT:  Recent Labs     22  1201   APTT 32.0     LIVER PROFILE:  Recent Labs     22  0722  1316 22  0509   AST 57* 76* 59*   ALT 73 80* 53     Lab Results   Component Value Date/Time    ALT (SGPT) 73 2022 07:21 AM    AST (SGOT) 57 (H) 2022 07:21 AM    Alk. phosphatase 97 2022 07:21 AM    Bilirubin, total 2.0 (H) 2022 07:21 AM       Imaging Last 24 Hours:  No results found.   Assessment//Plan   Principal Problem:    Acute respiratory failure with hypoxia ([address] (2022)    Active Problems:    Pulmonary emboli ([address] (2022)      Lung abscess ([address] (2022)      Deep vein thrombosis (DVT) of lower extremity ([address] (2022)      Atrial fibrillation ([address] (2022) Constipation (1/29/2022)      GIB (gastrointestinal bleeding) (1/29/2022)      Overview: Recent      Chronic gastric ulcer (1/29/2022)      Overview: Recent      COVID-19 (1/29/2022)      Overview: Dec 2021      Anemia (1/29/2022)      Hypokalemia (1/29/2022)      Bilateral pleural effusion (1/29/2022)      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 Reportedly positive for coronavirus the first week of december 2021.     1/2/22 presents to [facility] with altered mental status. During the ED course was found to have an infiltrate on chest imaging, and started on broad spectrum antibiotics. Admitted to hospital and improved during the hospital course with meropenem. Hospital course further complicated by pulmonary embolism. 1/6/22 discharged from hospital on oral anticoagulation.     1/7/22 returns to hospital for acute hypoxic respiratory failure. CT reveals new development of Cavitary lesion of the inferior lateral right middle lobe. Admitted to hospital and I have been asked to see her in consultation.     1/10/22 underwent EGD with Dr Ignacia Rodas:   Large duodenal bulb ulcer - injected and cauterized  Hiatal hernia  Recommendations:   PPI continuous infusion  NPO, IVF     For the cavitary pneumonia I had her on empiric meropenem with an anticipated stop date of 2/4/22.  1/24/22 discharged from [facility]     1/29/22 presents to [facility] because of lower abdominal pains. Reports constipation for the past few days. No improvement with enemas at home.  Admitted to hospital for stabilization and further workup of her condition.     MICROBIOLOGY     1/2/22              Covid 19          Positive  1/2/22              Blood               Negative  1/3/22              Urine                Negative  1/7/22              Blood Negative  1/29/22            Covid 19          Negative  1/29/22            Blood               Negative     ASSESSMENT AND RECOMMENDATIONS     1) Pneumonia with development in the setting of SARS-CoV-2 coronaviral respiratory syndrome. Further complicated by new development of right sided cavitary lesion, improved with a long course of meropenem.                 Repeat CT chest shows improvement in her pulmonary condition              Meropenem iv through 2/1/22    2) Presently admitted to [address] [place] [address] with neutropenia.      No objection to [employer] signed by Maikol Babcock MD on 2/1/2022 at 12:17 PM

## 2022-02-01 NOTE — CONSULTS
Consult    Subjective:     Matias Diallo is a 79 y.o.  female who is being seen for leukopenia and neutropenia. Pt WBC 0.6 K,Hb 8.8 gms/dl,platelets 616Y. ANC pending. Pt had COVID infection in December  and recent COVID test negative. Pt on meropenem for Cavitary pneumonia. Pt having problems with constipation. Denies fever,chills. Appetite low. Denies abdominal pain. History reviewed. No pertinent past medical history.    Past Surgical History:   Procedure Laterality Date    FL EGD DELIVER THERMAL ENERGY SPHNCTR/CARDIA GERD       Family History   Family history unknown: Yes      Social History     Tobacco Use    Smoking status: Not on file    Smokeless tobacco: Not on file   Substance Use Topics    Alcohol use: Not on file       Current Facility-Administered Medications   Medication Dose Route Frequency Provider Last Rate Last Admin    tbo-filgrastim (GRANIX) injection 300 mcg  300 mcg SubCUTAneous ONCE Magaly Castro MD        lactulose (CHRONULAC) 10 gram/15 mL solution 30 mL  30 mL Oral DAILY PRN Mesfin Lovett MD   30 mL at 01/31/22 1420    guaiFENesin ER (MUCINEX) tablet 600 mg  600 mg Oral Q12H Sushma Hawley MD   600 mg at 02/01/22 1017    albuterol-ipratropium (DUO-NEB) 2.5 MG-0.5 MG/3 ML  3 mL Nebulization Q6HWA RT Michael Alaniz DO   3 mL at 02/01/22 6786    polyethylene glycol (MIRALAX) packet 17 g  17 g Oral DAILY Reggie Loya MD   17 g at 02/01/22 1018    amiodarone (CORDARONE) tablet 200 mg  200 mg Oral DAILY Mesfin Lovett MD   200 mg at 02/01/22 1017    [Held by provider] pantoprazole (PROTONIX) tablet 40 mg  40 mg Oral BID Mesfin Lovett MD   40 mg at 02/01/22 1017    midodrine (PROAMATINE) tablet 5 mg  5 mg Oral BID WITH MEALS Mesfin Lovett MD   5 mg at 02/01/22 1017    apixaban (ELIQUIS) tablet 5 mg  5 mg Oral BID Mesfin Lovett MD   5 mg at 02/01/22 1017    sodium chloride (NS) flush 5-40 mL  5-40 mL IntraVENous Q8H Mesfin Lovett MD   10 mL at 02/01/22 0553    sodium chloride (NS) flush 5-40 mL  5-40 mL IntraVENous PRN Fredy Howell MD        acetaminophen (TYLENOL) tablet 650 mg  650 mg Oral Q6H PRN Fredy Howell MD        Or    acetaminophen (TYLENOL) suppository 650 mg  650 mg Rectal Q6H PRN Fredy Howell MD        polyethylene glycol (MIRALAX) packet 17 g  17 g Oral DAILY PRN Fredy Howell MD        ondansetron (ZOFRAN ODT) tablet 4 mg  4 mg Oral Q8H PRN Fredy Howell MD        Or    [facility]ron [facility]) [facility] 4 mg  4 mg IntraVENous Q6H PRN Fredy Howell MD            Allergies   Allergen Reactions    Penicillins Other (comments)        Review of Systems:  Other than mentioned in HPI 12 point review of systems negative    Objective: Intake and Output:    No intake/output data recorded. 01/30 1901 - 02/01 0700  In: -   Out: 1   Blood pressure 97/70, pulse 97, temperature 97.6 °F (36.4 °C), resp. rate 18, height 5' 4\" (1.626 m), weight 44.2 kg (97 lb 7.1 oz), SpO2 99 %. Physical Exam:   General:  Alert, cooperative, no distress, Chronically ill looking. Lungs:   Clear to auscultation bilaterally. Chest wall:  No tenderness or deformity. Heart:  Regular rate and rhythm, S1, S2 normal, no murmur, click, rub or gallop. Abdomen:   Soft, non-tender. Bowel sounds normal. No masses,  No organomegaly. Extremities: Extremities normal, atraumatic, no cyanosis or edema. Pulses: 2+ and symmetric all extremities. Skin: Skin color, texture, turgor normal. No rashes or lesions   Neurologic: CNII-XII intact. No gross sensory or motor deficits       Images:   XR CHEST PORT   Final Result   Comparison 1/29/2022. Interval development of mild pulmonary edema. CTA CHEST W OR W WO CONT   Final Result   Positive for pulmonary embolus, right lower lobe, acute versus subacute; the   report communicated to Fam[facility], 1451 hours. Cardiomegaly with bilateral   pleural effusions. Atherosclerosis. Hiatus hernia with partially intrathoracic   stomach. Distended gallbladder with sludge. Moderate rectal distention by stool. Degenerative changes of the spine. Convex leftward scoliosis, centered at the   L1-L2 level. CT ABD PELV W CONT   Final Result   Positive for pulmonary embolus, right lower lobe, acute versus subacute; the   report communicated to Layne Lee, 1451 hours. Cardiomegaly with bilateral   pleural effusions. Atherosclerosis. Hiatus hernia with partially intrathoracic   stomach. Distended gallbladder with sludge. Moderate rectal distention by stool. Degenerative changes of the spine. Convex leftward scoliosis, centered at the   L1-L2 level. XR CHEST PORT   Final Result   Comparison 6/22/2020. Central line position as above. Right hemithorax pleural fluid/pleural thickening. Additional chronic findings   as above. Data Review:   Recent Results (from the past 24 hour(s))   METABOLIC PANEL, COMPREHENSIVE    Collection Time: 01/31/22  1:16 PM   Result Value Ref Range    Sodium 140 136 - 145 mmol/L    Potassium 3.6 3.5 - 5.1 mmol/L    Chloride 105 97 - 108 mmol/L    CO2 33 (H) 21 - 32 mmol/L    Anion gap 2 (L) 5 - 15 mmol/L    Glucose 113 (H) 65 - 100 mg/dL    BUN 24 (H) 6 - 20 mg/dL    Creatinine 0.56 0.55 - 1.02 mg/dL    BUN/Creatinine ratio 43 (H) 12 - 20      GFR est AA >60 >60 ml/min/1.73m2    GFR est non-AA >60 >60 ml/min/1.73m2    Calcium 7.8 (L) 8.5 - 10.1 mg/dL    Bilirubin, total 1.9 (H) 0.2 - 1.0 mg/dL    AST (SGOT) 76 (H) 15 - 37 U/L    ALT (SGPT) 80 (H) 12 - 78 U/L    Alk.  phosphatase 97 45 - 117 U/L    Protein, total 4.5 (L) 6.4 - 8.2 g/dL    Albumin 1.8 (L) 3.5 - 5.0 g/dL    Globulin 2.7 2.0 - 4.0 g/dL    A-G Ratio 0.7 (L) 1.1 - 2.2     CBC WITH AUTOMATED DIFF    Collection Time: 01/31/22  1:16 PM   Result Value Ref Range    WBC 1.0 (L) 3.6 - 11.0 K/uL    RBC 2.86 (L) 3.80 - 5.20 M/uL    HGB 8.9 (L) 11.5 - 16.0 g/dL    HCT 28.3 (L) 35.0 - 47.0 %    MCV 99.0 80.0 - 99.0 FL    MCH 31.1 26.0 - 34.0 PG MCHC 31.4 30.0 - 36.5 g/dL    RDW 19.8 (H) 11.5 - 14.5 %    PLATELET 650 933 - 211 K/uL    MPV 11.2 8.9 - 12.9 FL    NRBC 0.0 0.0  WBC    ABSOLUTE NRBC 0.00 0.00 - 0.01 K/uL    NEUTROPHILS 23 (L) 32 - 75 %    LYMPHOCYTES 47 12 - 49 %    MONOCYTES 11 5 - 13 %    EOSINOPHILS 18 (H) 0 - 7 %    BASOPHILS 0 0 - 1 %    IMMATURE GRANULOCYTES 1 (H) 0 - 0.5 %    ABS. NEUTROPHILS 0.2 (L) 1.8 - 8.0 K/UL    ABS. LYMPHOCYTES 0.5 (L) 0.8 - 3.5 K/UL    ABS. MONOCYTES 0.1 0.0 - 1.0 K/UL    ABS. EOSINOPHILS 0.2 0.0 - 0.4 K/UL    ABS. BASOPHILS 0.0 0.0 - 0.1 K/UL    ABS. IMM. GRANS. 0.0 0.00 - 0.04 K/UL    DF AUTOMATED     CBC WITH AUTOMATED DIFF    Collection Time: 02/01/22  7:21 AM   Result Value Ref Range    WBC 0.6 (LL) 3.6 - 11.0 K/uL    RBC 2.87 (L) 3.80 - 5.20 M/uL    HGB 8.8 (L) 11.5 - 16.0 g/dL    HCT 28.4 (L) 35.0 - 47.0 %    MCV 99.0 80.0 - 99.0 FL    MCH 30.7 26.0 - 34.0 PG    MCHC 31.0 30.0 - 36.5 g/dL    RDW 19.7 (H) 11.5 - 14.5 %    PLATELET 550 445 - 873 K/uL    MPV 11.1 8.9 - 12.9 FL    NRBC 0.0 0.0  WBC    ABSOLUTE NRBC 0.00 0.00 - 0.01 K/uL    NEUTROPHILS PENDING %    LYMPHOCYTES PENDING %    MONOCYTES PENDING %    EOSINOPHILS PENDING %    BASOPHILS PENDING %    IMMATURE GRANULOCYTES PENDING %    ABS. NEUTROPHILS PENDING K/UL    ABS. LYMPHOCYTES PENDING K/UL    ABS. MONOCYTES PENDING K/UL    ABS. EOSINOPHILS PENDING K/UL    ABS. BASOPHILS PENDING K/UL    ABS. IMM. GRANS.  PENDING K/UL    DF PENDING    METABOLIC PANEL, COMPREHENSIVE    Collection Time: 02/01/22  7:21 AM   Result Value Ref Range    Sodium 143 136 - 145 mmol/L    Potassium 3.3 (L) 3.5 - 5.1 mmol/L    Chloride 106 97 - 108 mmol/L    CO2 34 (H) 21 - 32 mmol/L    Anion gap 3 (L) 5 - 15 mmol/L    Glucose 88 65 - 100 mg/dL    BUN 23 (H) 6 - 20 mg/dL    Creatinine 0.43 (L) 0.55 - 1.02 mg/dL    BUN/Creatinine ratio 53 (H) 12 - 20      GFR est AA >60 >60 ml/min/1.73m2    GFR est non-AA >60 >60 ml/min/1.73m2    Calcium 7.7 (L) 8.5 - 10.1 mg/dL Bilirubin, total 2.0 (H) 0.2 - 1.0 mg/dL    AST (SGOT) 57 (H) 15 - 37 U/L    ALT (SGPT) 73 12 - 78 U/L    Alk. phosphatase 97 45 - 117 U/L    Protein, total 4.2 (L) 6.4 - 8.2 g/dL    Albumin 1.7 (L) 3.5 - 5.0 g/dL    Globulin 2.5 2.0 - 4.0 g/dL    A-G Ratio 0.7 (L) 1.1 - 2.2     RETICULOCYTE COUNT    Collection Time: 02/01/22  7:21 AM   Result Value Ref Range    Reticulocyte count 2.4 (H) 0.7 - 2.1 %    Absolute Retic Cnt. 0.0690 0.0164 - 0.0776 M/ul   LD    Collection Time: 02/01/22  7:21 AM   Result Value Ref Range     (H) 81 - 246 U/L   TSH 3RD GENERATION    Collection Time: 02/01/22  7:21 AM   Result Value Ref Range    TSH 0.63 0.36 - 3.74 uIU/mL   MISC. LAB TEST    Collection Time: 02/01/22  7:21 AM   Result Value Ref Range    Test Description: PERIPHERAL SMAER         Assessment? plan:   Leukopenia neutropenia:Most likely due to medications and infection. Less likely due to bone marrow pathology. Pt white and ANC normal 3 days ago. Hold protonix. D/w ID. Meropenem some times causes neutropenia. ID going to hold meropenem. OK to give granix . Will give 1 dose today. Monitor CBC. Pt had COVID infection in December and recent COVID test negative. Neutropenic precautions. Check peripheral smear,retic count,LDH,b12,folate. Monitor. Normocytic hypochromic anemia:check iron studies,SPEP. Hb stable. Constipation:Mangement per primary team.     Acute respiratory failure with hypoxia     Pulmonary emboli  and DVT:continue eliquis. Monitor for bleeding closely. Lung abscess:Pt on Meropenem. ID following. Better. D/w ID. ID going to d/c meropenem due to neutropenia. Distended gall bladder    H/o GI bleeding:no active bleeding clinically. D/w pt and pt daughter. Thank you for the consult.                   Plan:

## 2022-02-01 NOTE — PROGRESS NOTES
Problem: Pressure Injury - Risk of  Goal: *Prevention of pressure injury  Description: Document Zeyad Scale and appropriate interventions in the flowsheet. Outcome: Progressing Towards Goal  Note: Pressure Injury Interventions:  Sensory Interventions: Assess changes in LOC,Keep linens dry and wrinkle-free,Maintain/enhance activity level    Moisture Interventions: Absorbent underpads,Apply protective barrier, creams and emollients,Internal/External urinary devices    Activity Interventions: Increase time out of bed,PT/OT evaluation    Mobility Interventions: HOB 30 degrees or less,PT/OT evaluation    Nutrition Interventions: Document food/fluid/supplement intake                     Problem: Patient Education: Go to Patient Education Activity  Goal: Patient/Family Education  Outcome: Progressing Towards Goal     Problem: Falls - Risk of  Goal: *Absence of Falls  Description: Document Esther Fall Risk and appropriate interventions in the flowsheet.   Outcome: Progressing Towards Goal  Note: Fall Risk Interventions:       Mentation Interventions: Adequate sleep, hydration, pain control,Bed/chair exit alarm,Door open when patient unattended    Medication Interventions: Bed/chair exit alarm,Patient to call before getting OOB    Elimination Interventions: Bed/chair exit alarm,Call light in reach              Problem: Patient Education: Go to Patient Education Activity  Goal: Patient/Family Education  Outcome: Progressing Towards Goal

## 2022-02-01 NOTE — PROGRESS NOTES
CM attempted to contact patient's daughter, Jing Allan [phone], to discuss DCP of SNF vs HH however no answer at this time, VM left requesting callback. CM continues to follow.

## 2022-02-01 NOTE — PROGRESS NOTES
Hospitalist Progress Note               Daily Progress Note: 2/1/2022      Subjective:   Hospital course to date: Patient is an 79-year-old female with a very complex medical history in recent months. She was admitted for COVID-19 in December 2021 and then hospitalized at [facility] on 1/2/2022 with altered mental status. She was treated with meropenem. That hospital stay was complicated by pulmonary embolism. She was discharged on 1/6 on oral anticoagulation. On 1/7 admitted to [facility] for hypoxic respiratory failure. CT showed new development of cavitary lesion lateral right middle lobe. She was seen by ID and started back on IV Merrem with an anticipated stop date of 2/4. During that hospital stay she underwent EGD which showed a large duodenal bulb ulcer. She was discharged from [facility] on 1/24    Patient then presents to our facility on 1/29 with abdominal pain and constipation    Patient has been seen by multiple consultants this hospital stay including pulmonology, cardiology, ID and hematology, the latter for leukopenia and neutropenia. There was concern that this was related to [facility] which has now been discontinued    ------    Patient seen today for follow-up. Therapy recommending SNF.  Her daughter is at the bedside and not sure that she wants her to go to a facility    Labs today show a white count of 0.6 with absolute neutrophil count of 0    Patient did have a bowel movement today although her daughter feels like she needs another enema    Problem List:  Problem List as of 2/1/2022 Date Reviewed: 1/29/2022          Codes Class Noted - Resolved    Pulmonary emboli ([email or web address] ICD-10-CM: I26.99  ICD-9-CM: 415.19  1/29/2022 - Present        * (Principal) Acute respiratory failure with hypoxia ([facility] ICD-10-CM: J96.01  ICD-9-CM: 518.81  1/29/2022 - Present        Lung abscess ([email or web address] ICD-10-CM: J85.2  ICD-9-CM: 513.0  1/29/2022 - Present        Deep vein thrombosis (DVT) of lower extremity ([facility] [address] [facility] ICD-10-CM: I82.409  ICD-9-CM: 453.40  1/29/2022 - Present        Atrial fibrillation ([facility] [address] [facility] ICD-10-CM: I48.91  ICD-9-CM: 427.31  1/29/2022 - Present        Constipation ICD-10-CM: K59.00  ICD-9-CM: 564.00  1/29/2022 - Present        GIB (gastrointestinal bleeding) (Chronic) ICD-10-CM: K92.2  ICD-9-CM: 578.9  1/29/2022 - Present    Overview Signed 1/29/2022  5:04 PM by Fausto Amlaraz MD     Recent             Chronic gastric ulcer (Chronic) ICD-10-CM: K25.7  ICD-9-CM: 531.70  1/29/2022 - Present    Overview Signed 1/29/2022  5:05 PM by Fausto Almaraz MD     Recent             COVID-19 (Chronic) ICD-10-CM: U07.1  ICD-9-CM: 079.89  1/29/2022 - Present    Overview Signed 1/29/2022  5:05 PM by Fausto Almaraz MD     Dec 2021             Anemia ICD-10-CM: D64.9  ICD-9-CM: 285.9  1/29/2022 - Present        Hypokalemia ICD-10-CM: E87.6  ICD-9-CM: 276.8  1/29/2022 - Present        Bilateral pleural effusion ICD-10-CM: J90  ICD-9-CM: 511.9  1/29/2022 - Present        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  ICD-9-CM: 751.60  1/29/2022 - Present        Hiatal hernia ICD-10-CM: K44.9  ICD-9-CM: 553.3  1/29/2022 - Present        Hypotension ICD-10-CM: I95.9  ICD-9-CM: 458.9  1/29/2022 - Present        Pleural effusion ICD-10-CM: J90  ICD-9-CM: 511.9  1/29/2022 - Present              Medications reviewed  Current Facility-Administered Medications   Medication Dose Route Frequency    tbo-filgrastim (GRANIX) injection 300 mcg  300 mcg SubCUTAneous ONCE    lactulose (CHRONULAC) 10 gram/15 mL solution 30 mL  30 mL Oral DAILY PRN    guaiFENesin ER (MUCINEX) tablet 600 mg  600 mg Oral Q12H    albuterol-ipratropium (DUO-NEB) 2.5 MG-0.5 MG/3 ML  3 mL Nebulization Q6HWA RT    polyethylene glycol (MIRALAX) packet 17 g  17 g Oral DAILY    amiodarone (CORDARONE) tablet 200 mg  200 mg Oral DAILY    [Held by provider] pantoprazole (PROTONIX) tablet 40 mg  40 mg Oral BID    midodrine (PROAMATINE) tablet 5 mg  5 mg Oral BID WITH MEALS    apixaban (ELIQUIS) tablet 5 mg  5 mg Oral BID    sodium chloride (NS) flush 5-40 mL  5-40 mL IntraVENous Q8H    sodium chloride (NS) flush 5-40 mL  5-40 mL IntraVENous PRN    acetaminophen (TYLENOL) tablet 650 mg  650 mg Oral Q6H PRN    Or    acetaminophen (TYLENOL) suppository 650 mg  650 mg Rectal Q6H PRN    polyethylene glycol (MIRALAX) packet 17 g  17 g Oral DAILY PRN    ondansetron (ZOFRAN ODT) tablet 4 mg  4 mg Oral Q8H PRN    Or    ondansetron (ZOFRAN) injection 4 mg  4 mg IntraVENous Q6H PRN       Review of Systems:   A comprehensive review of systems was negative except for that written in the HPI. Objective:   Physical Exam:     Visit Vitals  BP 95/72 (BP 1 Location: Right upper arm, BP Patient Position: At rest)   Pulse 61   Temp 98.1 °F (36.7 °C)   Resp 16   Ht 5' 4\" (1.626 m)   Wt 44.2 kg (97 lb 7.1 oz)   SpO2 90%   BMI 16.73 kg/m²    O2 Flow Rate (L/min): 2 l/min O2 Device: Nasal cannula    Temp (24hrs), Av.9 °F (36.6 °C), Min:97.6 °F (36.4 °C), Max:98.3 °F (36.8 °C)    No intake/output data recorded.  1901 -  0700  In: -   Out: 1     General:   Awake and alert   Lungs:   Clear to auscultation bilaterally. Chest wall:  No tenderness or deformity. Heart:  Regular rate and rhythm, S1, S2 normal, no murmur, click, rub or gallop. Abdomen:   Soft, non-tender. Bowel sounds normal. No masses,  No organomegaly. Extremities: Extremities normal, atraumatic, no cyanosis or edema. Pulses: 2+ and symmetric all extremities. Skin: Skin color, texture, turgor normal. No rashes or lesions   Neurologic: CNII-XII intact. No gross focal deficits         Data Review:       Recent Days:  Recent Labs     22  0721 22  1316 22  2042 22  0509 22  0509   WBC 0.6* 1.0*  --   --  2.1*   HGB 8.8* 8.9* 8.7*   < > 7.4*   HCT 28.4* 28.3* 27.5*   < > 23.4*    211  --   --  147*    < > = values in this interval not displayed.      Recent Labs     22  6912 01/31/22  1316 01/30/22  0509    140 141   K 3.3* 3.6 3.0*    105 105   CO2 34* 33* 36*   GLU 88 113* 84   BUN 23* 24* 23*   CREA 0.43* 0.56 0.49*   CA 7.7* 7.8* 7.3*   ALB 1.7* 1.8* 1.7*   TBILI 2.0* 1.9* 1.7*   ALT 73 80* 53     No results for input(s): PH, PCO2, PO2, HCO3, FIO2 in the last 72 hours. 24 Hour Results:  Recent Results (from the past 24 hour(s))   CBC WITH AUTOMATED DIFF    Collection Time: 02/01/22  7:21 AM   Result Value Ref Range    WBC 0.6 (LL) 3.6 - 11.0 K/uL    RBC 2.87 (L) 3.80 - 5.20 M/uL    HGB 8.8 (L) 11.5 - 16.0 g/dL    HCT 28.4 (L) 35.0 - 47.0 %    MCV 99.0 80.0 - 99.0 FL    MCH 30.7 26.0 - 34.0 PG    MCHC 31.0 30.0 - 36.5 g/dL    RDW 19.7 (H) 11.5 - 14.5 %    PLATELET 532 016 - 683 K/uL    MPV 11.1 8.9 - 12.9 FL    NRBC 0.0 0.0  WBC    ABSOLUTE NRBC 0.00 0.00 - 0.01 K/uL    NEUTROPHILS 5 (L) 32 - 75 %    LYMPHOCYTES 59 (H) 12 - 49 %    MONOCYTES 12 5 - 13 %    EOSINOPHILS 24 (H) 0 - 7 %    BASOPHILS 0 0 - 1 %    IMMATURE GRANULOCYTES 0 %    ABS. NEUTROPHILS 0.0 (L) 1.8 - 8.0 K/UL    ABS. LYMPHOCYTES 0.4 (L) 0.8 - 3.5 K/UL    ABS. MONOCYTES 0.1 0.0 - 1.0 K/UL    ABS. EOSINOPHILS 0.1 0.0 - 0.4 K/UL    ABS. BASOPHILS 0.0 0.0 - 0.1 K/UL    ABS. IMM. GRANS. 0.0 K/UL    DF Manual      RBC COMMENTS Microcytosis  1+        RBC COMMENTS Spherocytes  1+       METABOLIC PANEL, COMPREHENSIVE    Collection Time: 02/01/22  7:21 AM   Result Value Ref Range    Sodium 143 136 - 145 mmol/L    Potassium 3.3 (L) 3.5 - 5.1 mmol/L    Chloride 106 97 - 108 mmol/L    CO2 34 (H) 21 - 32 mmol/L    Anion gap 3 (L) 5 - 15 mmol/L    Glucose 88 65 - 100 mg/dL    BUN 23 (H) 6 - 20 mg/dL    Creatinine 0.43 (L) 0.55 - 1.02 mg/dL    BUN/Creatinine ratio 53 (H) 12 - 20      GFR est AA >60 >60 ml/min/1.73m2    GFR est non-AA >60 >60 ml/min/1.73m2    Calcium 7.7 (L) 8.5 - 10.1 mg/dL    Bilirubin, total 2.0 (H) 0.2 - 1.0 mg/dL    AST (SGOT) 57 (H) 15 - 37 U/L    ALT (SGPT) 73 12 - 78 U/L    Alk. phosphatase 97 45 - 117 U/L    Protein, total 4.2 (L) 6.4 - 8.2 g/dL    Albumin 1.7 (L) 3.5 - 5.0 g/dL    Globulin 2.5 2.0 - 4.0 g/dL    A-G Ratio 0.7 (L) 1.1 - 2.2     RETICULOCYTE COUNT    Collection Time: 02/01/22  7:21 AM   Result Value Ref Range    Reticulocyte count 2.4 (H) 0.7 - 2.1 %    Absolute Retic Cnt. 0.0690 0.0164 - 0.0776 M/ul   LD    Collection Time: 02/01/22  7:21 AM   Result Value Ref Range     (H) 81 - 246 U/L   TSH 3RD GENERATION    Collection Time: 02/01/22  7:21 AM   Result Value Ref Range    TSH 0.63 0.36 - 3.74 uIU/mL   MISC. LAB TEST    Collection Time: 02/01/22  7:21 AM   Result Value Ref Range    Test Description: PERIPHERAL SMAER        XR CHEST PORT   Final Result   Comparison 1/29/2022. Interval development of mild pulmonary edema. CTA CHEST W OR W WO CONT   Final Result   Positive for pulmonary embolus, right lower lobe, acute versus subacute; the   report communicated to [facility], 1451 hours. Cardiomegaly with bilateral   pleural effusions. Atherosclerosis. Hiatus hernia with partially intrathoracic   stomach. Distended gallbladder with sludge. Moderate rectal distention by stool. Degenerative changes of the spine. Convex leftward scoliosis, centered at the   L1-L2 level. CT ABD PELV W CONT   Final Result   Positive for pulmonary embolus, right lower lobe, acute versus subacute; the   report communicated to [facility], 1451 hours. Cardiomegaly with bilateral   pleural effusions. Atherosclerosis. Hiatus hernia with partially intrathoracic   stomach. Distended gallbladder with sludge. Moderate rectal distention by stool. Degenerative changes of the spine. Convex leftward scoliosis, centered at the   L1-L2 level. XR CHEST PORT   Final Result   Comparison 6/22/2020. Central line position as above. Right hemithorax pleural fluid/pleural thickening. Additional chronic findings   as above.            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 On Eliquis    Peptic ulcer disease with recent large duodenal bulb ulcer      Plan:  Continue supportive care  Hematology following, was given Granix today  Follow CBC    Give fleets enema now    Care Plan discussed with: Patient/Family    Disposition: SNF    Total time spent with patient: 30 minutes.     Nirmal Cantu MD

## 2022-02-01 NOTE — PROGRESS NOTES
Progress Note     CHIEF COMPLAINT:     History: xAel Taveras is a 79 y.o. WHITE/NON- female admitted on 1/29/2022 by Collette Schultz, MD whose history is given by a review of chart and the patient. We are asked by Hospitalist to see in consultation for atrial fibrillation. Review of Systems     A comprehensive review of systems was negative except for that written in the HPI. [address] [facility] [address]: 4     2/1/22: Patient is awake and alert this morning. She denies chest pain or discomfort. Continues to complain of abdominal pain, no bowel movement as of yet. Her daughter is at the bedside and has questions regarding her medications. Case discussed with Dr. Anastacio Bhatt and our impression and plan are as follows:      1. Atrial fibrillation:   - in atrial flutter today per telemetry, HR   - continue amiodarone and Eliquis  - continue telemetry  - will order EKG to monitor QTc.  - maintain electrolytes and replete as needed.       2. History of PE  - continue Eliquis    Medical Issues:  has no past medical history on file.      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    Hiatal hernia 01/29/2022    Hypotension 01/29/2022    Pleural effusion 01/29/2022        VITAL SIGNS:        Visit Vitals  /76 (BP 1 Location: Left upper arm, BP Patient Position: At rest)   Pulse 90   Temp 98.3 °F (36.8 °C)   Resp 16   Ht 5' 4\" (1.626 m)   Wt 44.2 kg (97 lb 7.1 oz)   SpO2 94%   BMI 16.73 kg/m² Temp (24hrs), Av.8 °F (36.6 °C), Min:97.3 °F (36.3 °C), Max:98.3 °F (36.8 °C)         MEDICATIONS       Current Facility-Administered Medications:     tbo-filgrastim (GRANIX) injection 300 mcg, 300 mcg, SubCUTAneous, ONCE, Corinne Vargas MD    lactulose (CHRONULAC) 10 gram/15 mL solution 30 mL, 30 mL, Oral, DAILY PRN, Melissa Stuart MD, 30 mL at 22 1420    guaiFENesin ER (MUCINEX) tablet 600 mg, 600 mg, Oral, Q12H, Nalini Daniels MD, 600 mg at 22 1017    albuterol-ipratropium (DUO-NEB) 2.5 MG-0.5 MG/3 ML, 3 mL, Nebulization, Q6HWA RT, Michael Alaniz DO, 3 mL at 22 8050    polyethylene glycol (MIRALAX) packet 17 g, 17 g, Oral, DAILY, Reggie Loya MD, 17 g at 22 1018    meropenem (MERREM) 1 g in 0.9% sodium chloride 20 mL IV syringe, 1 g, IntraVENous, Q8H, Girma Clinton MD, 1 g at 22 1016    amiodarone (CORDARONE) tablet 200 mg, 200 mg, Oral, DAILY, Girma Clinton MD, 200 mg at 22 1017    [Held by provider] pantoprazole (PROTONIX) tablet 40 mg, 40 mg, Oral, BID, Melissa Stuart MD, 40 mg at 22 1017    midodrine (PROAMATINE) tablet 5 mg, 5 mg, Oral, BID WITH MEALS, Girma Clinton MD, 5 mg at 22 1017    apixaban (ELIQUIS) tablet 5 mg, 5 mg, Oral, BID, Melissa Stuart MD, 5 mg at 22 1017    sodium chloride (NS) flush 5-40 mL, 5-40 mL, IntraVENous, Q8H, Girma Clinton MD, 10 mL at 22 0553    sodium chloride (NS) flush 5-40 mL, 5-40 mL, IntraVENous, PRN, Melissa Stuart MD    acetaminophen (TYLENOL) tablet 650 mg, 650 mg, Oral, Q6H PRN **OR** acetaminophen (TYLENOL) suppository 650 mg, 650 mg, Rectal, Q6H PRN, Girma Yanez MD    polyethylene glycol (MIRALAX) packet 17 g, 17 g, Oral, DAILY PRN, Girma Yanez MD    ondansetron (ZOFRAN ODT) tablet 4 mg, 4 mg, Oral, Q8H PRN **OR** ondansetron (ZOFRAN) injection 4 mg, 4 mg, IntraVENous, Q6H PRN, Girma Clinton MD      INTAKE / OUPUT     No intake or output data in the 24 hours ending 02/01/22 1112           EXAM:       .Physical Exam  Constitutional:       General: She is not in acute distress. Appearance: She is ill-appearing. Cardiovascular:      Rate and Rhythm: Normal rate and regular rhythm. Pulses: Normal pulses. Heart sounds: Normal heart sounds. Pulmonary:      Effort: Pulmonary effort is normal. No respiratory distress. Breath sounds: Normal breath sounds. No stridor. No wheezing, rhonchi or rales. Abdominal:      General: Abdomen is flat. Bowel sounds are normal. There is no distension. Palpations: Abdomen is soft. Tenderness: There is no abdominal tenderness. There is no rebound. Skin:     General: Skin is warm and dry. Coloration: Skin is pale. Neurological:      General: No focal deficit present. Mental Status: She is alert and oriented to person, place, and time. Psychiatric:         Mood and Affect: Mood normal.         Behavior: Behavior normal.          DATA:      Recent Labs     02/01/22  0721 01/31/22  1316 01/30/22  2042 01/30/22  0509 01/30/22  0509 01/29/22  1201 01/29/22  1201   WBC 0.6* 1.0*  --   --  2.1*   < > 4.0   HGB 8.8* 8.9* 8.7*   < > 7.4*   < > 8.9*    211  --   --  147*   < > 161   INR  --   --   --   --   --   --  1.5*   APTT  --   --   --   --   --   --  32.0    < > = values in this interval not displayed. Recent Labs     02/01/22  0721 01/31/22  1316 01/30/22  0509 01/29/22  1201 01/29/22  1201    140 141   < > 140   K 3.3* 3.6 3.0*   < > 3.0*    105 105   < > 102   CO2 34* 33* 36*   < > 36*   GLU 88 113* 84   < > 110*   BUN 23* 24* 23*   < > 26*   CREA 0.43* 0.56 0.49*   < > 0.53*   CA 7.7* 7.8* 7.3*   < > 7.6*   LAC  --   --   --   --  1.1   ALB 1.7* 1.8* 1.7*   < > 1.6*   ALT 73 80* 53   < > 54   LPSE  --   --   --   --  59*    < > = values in this interval not displayed.             IMAGING:       Results from Hospital Encounter encounter on 01/29/22    XR CHEST PORT    Narrative  Chest one view. AP semiupright portable at 0925 dated 1/30/2022. Comparison 1/29/2022. A right arm PICC line remains in place. The heart is enlarged with calcified  plaque and tortuosity in the aorta. Right pleural fluid/pleural thickening is redemonstrated. There has been interval development of mild interstitial edema in perihilar and  lower lobe distributions. There is no lobar consolidation or pneumothorax. Impression  Comparison 1/29/2022. Interval development of mild pulmonary edema. Results from [place] encounter on 01/29/22    CT ABD PELV W CONT    Narrative  Dose Reduction:  All CT scans at this facility are performed using dose reduction optimization  techniques as appropriate to a performed exam including the following: Automated  exposure control, adjustments of the mA and/or kV according to patient size, or  use of iterative reconstruction technique. IV contrast: 100 cc Isovue 370    TECHNIQUE: Contrast-enhanced images of chest, abdomen, and pelvis. MIP reformats  of thoracic CT angiography. CTA chest: Prominent enlargement of thyroid gland. Asymmetric extension of left  thyroid lobe with multiple calcified nodules to involve the superior and middle  mediastinum, with slight mass effect on the airway at the thoracic inlet and the  mid and upper thoracic esophagus. Calcified tortuous thoracic aorta, without  focal aneurysmal dilatation. Cardiomegaly with dilated left atrium and enlarged  right atrium and right ventricle. Reflux of contrast into dilated IVC and  hepatic veins, consistent with right-sided cardiac dysfunction. Right lower lobe  pulmonary arterial filling defects, consistent with pulmonary emboli, acute  versus subacute. Main pulmonary artery 2.5 cm. Small bilateral pleural  effusions. Hiatus hernia with partially intrathoracic stomach.  Thin-walled  cavitary lesion, right middle lobe, possibly abscess or pneumatocele, less  likely neoplasm. Upper lobe/apical predominant emphysema. Diffuse bronchial wall  thickening with endobronchial opacities in the lower lobes bilaterally. No focal  lytic or blastic bone lesion. Degenerative changes of mid thoracic spine. CT abdomen/pelvis: Subcentimeter bilateral hepatic lobe lesions are  statistically likely benign but too small to characterize definitively. Layering  sludge is suspected in the gallbladder. No evidence of radiodense gallbladder  stones. Normal caliber bile duct. Borderline prominence of pancreatic duct. No  focal abnormality is seen involving the pancreas, spleen, or adrenal glands. Subcentimeter renal lesions, not well characterized. No intrinsic abnormality of  the urinary bladder. Hysterectomy. Moderate rectal distention by stool. Mild  edema of perirectal fat. No evidence of intestinal wall thickening or mechanical  obstruction. Appendix not discretely visualized. No definite pneumoperitoneum. Atherosclerotic calcification and tortuosity of the aorta, without aneurysmal  dilatation. Prominent atherosclerotic calcification of iliac and femoral  arteries. No pathologic pelvic or retroperitoneal adenopathy. Degenerative  changes of the lumbar spine with convex leftward lumbar scoliosis. Impression  Positive for pulmonary embolus, right lower lobe, acute versus subacute; the  report communicated to Sandra Elam, 1451 hours. Cardiomegaly with bilateral  pleural effusions. Atherosclerosis. Hiatus hernia with partially intrathoracic  stomach. Distended gallbladder with sludge. Moderate rectal distention by stool. Degenerative changes of the spine. Convex leftward scoliosis, centered at the  L1-L2 level.       Results for orders placed or performed during the hospital encounter of 01/29/22   EKG, 12 LEAD, INITIAL   Result Value Ref Range    Ventricular Rate 98 BPM    Atrial Rate 300 BPM    QRS Duration 82 ms    Q-T Interval 342 ms    QTC Calculation (Bezet) 436 ms    Calculated R Axis -10 degrees    Calculated T Axis -120 degrees    Diagnosis       Atrial fibrillation  Low voltage QRS  Septal infarct (cited on or before 22-JUN-2020)  Abnormal ECG  When compared with ECG of 22-JUN-2020 13:28,  Significant changes have occurred  Confirmed by Miguel Saul ([id number]) on 1/31/2022 5:22:09 PM          History:     PMH:  has no past medical history on file. PSH:   has a past surgical history that includes pr egd deliver thermal energy sphnctr/cardia gerd. FHX: Family history is unknown by patient. SHX:      ALL:   Allergies   Allergen Reactions    Penicillins Other (comments)        MEDS:   [x] Reviewed - As Below   [] Not reviewed    Current Facility-Administered Medications   Medication    tbo-filgrastim (GRANIX) injection 300 mcg    lactulose (CHRONULAC) 10 gram/15 mL solution 30 mL    guaiFENesin ER (MUCINEX) tablet 600 mg    albuterol-ipratropium (DUO-NEB) 2.5 MG-0.5 MG/3 ML    polyethylene glycol (MIRALAX) packet 17 g    meropenem (MERREM) 1 g in 0.9% sodium chloride 20 mL IV syringe    amiodarone (CORDARONE) tablet 200 mg    [Held by provider] pantoprazole (PROTONIX) tablet 40 mg    midodrine (PROAMATINE) tablet 5 mg    apixaban (ELIQUIS) tablet 5 mg    sodium chloride (NS) flush 5-40 mL    sodium chloride (NS) flush 5-40 mL    acetaminophen (TYLENOL) tablet 650 mg    Or    acetaminophen (TYLENOL) suppository 650 mg    polyethylene glycol (MIRALAX) packet 17 g    ondansetron (ZOFRAN ODT) tablet 4 mg    Or    ondansetron (ZOFRAN) injection 4 mg         Tia Christie NP       Please do not hesitate to call with any further questions or concerns. Dr. Shaun Valdivia Cardiology  [address] [facility] [address], [address] [facility]  [phone]

## 2022-02-01 NOTE — PROGRESS NOTES
Pulmonary/CC Progress Note  Elderly patient with history of extensive tobacco abuse, COPD recently treated for COVID-19 pneumonia in 2021. Recently she was hospitalized with right middle lobe abscess and had been on IV meropenem. CT scan showed subacute thrombus    Subjective:   Daily Progress Note: 2022 4:16 PM    Patient without significant distress. Afebrile. Offers no new complaints.     Current Facility-Administered Medications   Medication Dose Route Frequency    sodium phosphate (FLEET'S) enema 1 Enema  1 Enema Rectal NOW    lactulose (CHRONULAC) 10 gram/15 mL solution 30 mL  30 mL Oral DAILY PRN    guaiFENesin ER (MUCINEX) tablet 600 mg  600 mg Oral Q12H    albuterol-ipratropium (DUO-NEB) 2.5 MG-0.5 MG/3 ML  3 mL Nebulization Q6HWA RT    polyethylene glycol (MIRALAX) packet 17 g  17 g Oral DAILY    amiodarone (CORDARONE) tablet 200 mg  200 mg Oral DAILY    [Held by provider] pantoprazole (PROTONIX) tablet 40 mg  40 mg Oral BID    midodrine (PROAMATINE) tablet 5 mg  5 mg Oral BID WITH MEALS    apixaban (ELIQUIS) tablet 5 mg  5 mg Oral BID    sodium chloride (NS) flush 5-40 mL  5-40 mL IntraVENous Q8H    sodium chloride (NS) flush 5-40 mL  5-40 mL IntraVENous PRN    acetaminophen (TYLENOL) tablet 650 mg  650 mg Oral Q6H PRN    Or    acetaminophen (TYLENOL) suppository 650 mg  650 mg Rectal Q6H PRN    polyethylene glycol (MIRALAX) packet 17 g  17 g Oral DAILY PRN    ondansetron (ZOFRAN ODT) tablet 4 mg  4 mg Oral Q8H PRN    Or    ondansetron (ZOFRAN) injection 4 mg  4 mg IntraVENous Q6H PRN        Review of Systems  Unchanged from initial consult    Objective:     Visit Vitals  BP 95/72 (BP 1 Location: Right upper arm, BP Patient Position: At rest)   Pulse 61   Temp 98.1 °F (36.7 °C)   Resp 16   Ht 5' 4\" (1.626 m)   Wt 44.2 kg (97 lb 7.1 oz)   SpO2 90%   BMI 16.73 kg/m²    O2 Flow Rate (L/min): 2 l/min O2 Device: Nasal cannula    Temp (24hrs), Av.9 °F (36.6 °C), Min:97.6 °F (36.4 °C), Max:98.3 °F (36.8 °C)      No intake/output data recorded. 01/30 1901 - 02/01 0700  In: -   Out: 1     HEENT: Normal HEENT exam.    Lungs:  Normal effort and normal respiratory rate. Heart: Normal rate. Abdomen: Abdomen is soft. Neurological: Patient is alert. Skin:  Warm and dry. Lab/Data Review:  Recent Labs     02/01/22  0721 01/31/22  1316 01/30/22 2042 01/30/22  0509 01/30/22  0509   WBC 0.6* 1.0*  --   --  2.1*   HGB 8.8* 8.9* 8.7*   < > 7.4*   HCT 28.4* 28.3* 27.5*   < > 23.4*    211  --   --  147*    < > = values in this interval not displayed. Recent Labs     02/01/22  0721 01/31/22  1316 01/30/22  0509    140 141   K 3.3* 3.6 3.0*    105 105   CO2 34* 33* 36*   GLU 88 113* 84   BUN 23* 24* 23*   CREA 0.43* 0.56 0.49*   CA 7.7* 7.8* 7.3*   ALB 1.7* 1.8* 1.7*   TBILI 2.0* 1.9* 1.7*   ALT 73 80* 53     No results for input(s): PH, PCO2, PO2, HCO3, FIO2 in the last 72 hours. Diagnostics   CXR Results  (Last 48 hours)    None             Assessment/Plan:     Principal Problem:    Acute respiratory failure with hypoxia ([address] (1/29/2022)    Active Problems:    Pulmonary emboli ([address] (1/29/2022)      Lung abscess ([address] (1/29/2022)      Deep vein thrombosis (DVT) of lower extremity (HCC) (1/29/2022)      Atrial fibrillation ([address] (1/29/2022)      Constipation (1/29/2022)      GIB (gastrointestinal bleeding) (1/29/2022)      Overview: Recent      Chronic gastric ulcer (1/29/2022)      Overview: Recent      COVID-19 (1/29/2022)      Overview: Dec 2021      Anemia (1/29/2022)      Hypokalemia (1/29/2022)      Bilateral pleural effusion (1/29/2022)      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 An 24-year-old frail  female with a history of very extensive tobacco abuse.   She has underlying chronic obstructive pulmonary disease but does not appear to be in exacerbation. She had a recent hospitalization in [facility] a couple of times. In 12/2021, she had COVID-19 pneumonia. More recently hospitalized with right middle lobe abscess. Patient underwent CT-guided drainage of abscess only to 3 cc of foul-smelling fluid was drained after which she improved, WBC count came back to normal and she was discharged from the hospital on IV meropenem. .     Also venous thromboembolism and has been on Eliquis. Meropenem and Eliquis have been resumed. CTA shows subacute thrombus. There is no focal airspace disease. Continue with oxygen 2 L which she already has at home. Patient got started on nebulized bronchodilator treatments around-the-clock and Mucinex. However, her CT scan findings are also suggestive of occult aspiration. Speech therapist was involved. She appears to be at risk for aspiration. Her oral intake has been poor and there is apparently some weight loss. 2.  Small to moderate bilateral pleural effusions. She appears to be third spacing. She also has dependent edema. Albumin is 1.7 only. Her EF is normal.  I believe that her effusions and third spacing is due to hypoalbuminemia and malnutrition. 3.  Atrial fibrillation and venous thromboembolism. Continue with Eliquis. Monitor hemoglobin. 4.  History of gastrointestinal bleed due to gastric ulcer, status post cauterization at [facility]. Continue with Protonix p.o. b.i.d. She also has gallbladder sludge. 5.  Constipation. Further management as per primary attending. 6.  Enlarged thyroid and part of that appears to be retrosternal.  She also has mediastinal calcified lymphadenopathy. I believe this is all part and parcel of prior granulomatous disease. No acute problems. Care Plan discussed with: Patient    Total time spent with patient: 20 minutes.     Vasquez Murray MD  [facility]

## 2022-02-02 LAB
ATRIAL RATE: 375 BPM
ATRIAL RATE: 394 BPM
BASOPHILS # BLD: 0 K/UL (ref 0–0.1)
BASOPHILS NFR BLD: 0 % (ref 0–1)
CALCULATED R AXIS, ECG10: -48 DEGREES
CALCULATED R AXIS, ECG10: -5 DEGREES
CALCULATED T AXIS, ECG11: -165 DEGREES
CALCULATED T AXIS, ECG11: 176 DEGREES
DIAGNOSIS, 93000: NORMAL
DIAGNOSIS, 93000: NORMAL
DIFFERENTIAL METHOD BLD: ABNORMAL
EOSINOPHIL # BLD: 0.1 K/UL (ref 0–0.4)
EOSINOPHIL NFR BLD: 12 % (ref 0–7)
ERYTHROCYTE [DISTWIDTH] IN BLOOD BY AUTOMATED COUNT: 19.3 % (ref 11.5–14.5)
FOLATE SERPL-MCNC: 4.5 NG/ML (ref 5–21)
HCT VFR BLD AUTO: 30.4 % (ref 35–47)
HGB BLD-MCNC: 9.5 G/DL (ref 11.5–16)
IMM GRANULOCYTES # BLD AUTO: 0 K/UL
IMM GRANULOCYTES NFR BLD AUTO: 0 %
IRON SATN MFR SERPL: 37 % (ref 20–50)
IRON SERPL-MCNC: 58 UG/DL (ref 35–150)
LYMPHOCYTES # BLD: 0.4 K/UL (ref 0.8–3.5)
LYMPHOCYTES NFR BLD: 76 % (ref 12–49)
MCH RBC QN AUTO: 31.3 PG (ref 26–34)
MCHC RBC AUTO-ENTMCNC: 31.3 G/DL (ref 30–36.5)
MCV RBC AUTO: 100 FL (ref 80–99)
MONOCYTES # BLD: 0.1 K/UL (ref 0–1)
MONOCYTES NFR BLD: 10 % (ref 5–13)
NEUTS SEG # BLD: 0 K/UL (ref 1.8–8)
NEUTS SEG NFR BLD: 2 % (ref 32–75)
NRBC # BLD: 0 K/UL (ref 0–0.01)
NRBC BLD-RTO: 0 PER 100 WBC
PLATELET # BLD AUTO: 232 K/UL (ref 150–400)
PLATELET COMMENTS,PCOM: ABNORMAL
PMV BLD AUTO: 10.9 FL (ref 8.9–12.9)
Q-T INTERVAL, ECG07: 308 MS
Q-T INTERVAL, ECG07: 312 MS
QRS DURATION, ECG06: 70 MS
QRS DURATION, ECG06: 72 MS
QTC CALCULATION (BEZET), ECG08: 394 MS
QTC CALCULATION (BEZET), ECG08: 395 MS
RBC # BLD AUTO: 3.04 M/UL (ref 3.8–5.2)
RBC MORPH BLD: ABNORMAL
RBC MORPH BLD: ABNORMAL
TIBC SERPL-MCNC: 157 UG/DL (ref 250–450)
VENTRICULAR RATE, ECG03: 96 BPM
VENTRICULAR RATE, ECG03: 99 BPM
VIT B12 SERPL-MCNC: 611 PG/ML (ref 193–986)
WBC # BLD AUTO: 0.6 K/UL (ref 3.6–11)

## 2022-02-02 PROCEDURE — 94760 N-INVAS EAR/PLS OXIMETRY 1: CPT

## 2022-02-02 PROCEDURE — 94640 AIRWAY INHALATION TREATMENT: CPT

## 2022-02-02 PROCEDURE — 93005 ELECTROCARDIOGRAM TRACING: CPT

## 2022-02-02 PROCEDURE — 85025 COMPLETE CBC W/AUTO DIFF WBC: CPT

## 2022-02-02 PROCEDURE — 74011250637 HC RX REV CODE- 250/637: Performed by: STUDENT IN AN ORGANIZED HEALTH CARE EDUCATION/TRAINING PROGRAM

## 2022-02-02 PROCEDURE — 84155 ASSAY OF PROTEIN SERUM: CPT

## 2022-02-02 PROCEDURE — 74011000250 HC RX REV CODE- 250: Performed by: INTERNAL MEDICINE

## 2022-02-02 PROCEDURE — 77010033678 HC OXYGEN DAILY

## 2022-02-02 PROCEDURE — 74011250637 HC RX REV CODE- 250/637: Performed by: INTERNAL MEDICINE

## 2022-02-02 PROCEDURE — 36415 COLL VENOUS BLD VENIPUNCTURE: CPT

## 2022-02-02 PROCEDURE — 97530 THERAPEUTIC ACTIVITIES: CPT

## 2022-02-02 PROCEDURE — 65270000029 HC RM PRIVATE

## 2022-02-02 PROCEDURE — 74011250636 HC RX REV CODE- 250/636: Performed by: INTERNAL MEDICINE

## 2022-02-02 PROCEDURE — 82607 VITAMIN B-12: CPT

## 2022-02-02 PROCEDURE — 94761 N-INVAS EAR/PLS OXIMETRY MLT: CPT

## 2022-02-02 PROCEDURE — 83540 ASSAY OF IRON: CPT

## 2022-02-02 RX ORDER — FOLIC ACID 1 MG/1
1 TABLET ORAL DAILY
Status: DISCONTINUED | OUTPATIENT
Start: 2022-02-03 | End: 2022-02-10 | Stop reason: HOSPADM

## 2022-02-02 RX ADMIN — AMIODARONE HYDROCHLORIDE 200 MG: 200 TABLET ORAL at 16:21

## 2022-02-02 RX ADMIN — POLYETHYLENE GLYCOL 3350 17 G: 17 POWDER, FOR SOLUTION ORAL at 16:22

## 2022-02-02 RX ADMIN — TBO-FILGRASTIM 300 MCG: 300 INJECTION, SOLUTION SUBCUTANEOUS at 16:23

## 2022-02-02 RX ADMIN — APIXABAN 5 MG: 5 TABLET, FILM COATED ORAL at 22:31

## 2022-02-02 RX ADMIN — IPRATROPIUM BROMIDE AND ALBUTEROL SULFATE 3 ML: .5; 2.5 SOLUTION RESPIRATORY (INHALATION) at 08:43

## 2022-02-02 RX ADMIN — GUAIFENESIN 600 MG: 600 TABLET, EXTENDED RELEASE ORAL at 16:21

## 2022-02-02 RX ADMIN — SODIUM CHLORIDE, PRESERVATIVE FREE 10 ML: 5 INJECTION INTRAVENOUS at 22:33

## 2022-02-02 RX ADMIN — GUAIFENESIN 600 MG: 600 TABLET, EXTENDED RELEASE ORAL at 22:31

## 2022-02-02 RX ADMIN — IPRATROPIUM BROMIDE AND ALBUTEROL SULFATE 3 ML: .5; 2.5 SOLUTION RESPIRATORY (INHALATION) at 13:53

## 2022-02-02 RX ADMIN — APIXABAN 5 MG: 5 TABLET, FILM COATED ORAL at 16:21

## 2022-02-02 RX ADMIN — SODIUM CHLORIDE, PRESERVATIVE FREE 10 ML: 5 INJECTION INTRAVENOUS at 16:23

## 2022-02-02 RX ADMIN — SODIUM CHLORIDE, PRESERVATIVE FREE 10 ML: 5 INJECTION INTRAVENOUS at 05:23

## 2022-02-02 RX ADMIN — IPRATROPIUM BROMIDE AND ALBUTEROL SULFATE 3 ML: .5; 2.5 SOLUTION RESPIRATORY (INHALATION) at 20:47

## 2022-02-02 RX ADMIN — MIDODRINE HYDROCHLORIDE 5 MG: 5 TABLET ORAL at 16:21

## 2022-02-02 NOTE — PROGRESS NOTES
CM placed call to patient daughter Munira Ohs [phone] to discuss SNF choices. No answer. Left message to return call.

## 2022-02-02 NOTE — PROGRESS NOTES
PHYSICAL THERAPY TREATMENT  Patient: Heike Carter (9 y.o. female)  Date: 2/2/2022  Diagnosis: Acute respiratory failure with hypoxia ([facility] [J96.01]  Pleural effusion [J90]  Lung abscess ([facility] [J85.2] Acute respiratory failure with hypoxia [facility])       Precautions: Fall  Chart, physical therapy assessment, plan of care and goals were reviewed. ASSESSMENT  Patient continues with skilled PT services and is progressing towards goals. Pt semi supine in bed upon arrival, daughter at bedside, both agreeable to therapy session. Pt moaning throughout session 2/2 stomach pain, daughter reporting pt having trouble having BM. Pt completed sup>sit with max A. Pt with poor sitting balance at EOB. Pt STS from EOB with max A x 2 and bed>BSC with TA x 2. Pt sat on [facility] with daughter holding pt upright and therapist assisting pt in front with sitting balance. Pt required increased time for toileting. Pt completed STS from [facility] 2x during session with max A x 2, NG assisted with bowel hygiene. Pt completed BSC>EOB with TA x 2, sit>supine with max A x 2. Daughter voicing concerns about pt having difficulty having BM, notified RN. Pt left semi supine in bed with call bell in reach and needs met. Rec d/c to SNF once medically appropriate. Current Level of Function Impacting Discharge (mobility/balance): assistance required for all mobility     Other factors to consider for discharge: PLOF, time since onset, severity of deficits          PLAN :  Patient continues to benefit from skilled intervention to address the above impairments. Continue treatment per established plan of care. to address goals.     Recommendation for discharge: (in order for the patient to meet his/her long term goals)  Krishan Driscoll    This discharge recommendation:  Has been made in collaboration with the attending provider and/or case management    IF patient discharges home will need the following DME: to be determined (TBD)       SUBJECTIVE: Patient stated oh it hurts so bad referring to her stomach    OBJECTIVE DATA SUMMARY:   Critical Behavior:  Neurologic State: Alert  Orientation Level: Oriented to person,Oriented to place  Cognition: Follows commands    Functional Mobility Training:  Bed Mobility:  Supine to Sit: Maximum assistance  Sit to Supine: Maximum assistance;Assist x2  Scooting: Maximum assistance    Transfers:  Sit to Stand: Maximum assistance;Assist x2  Stand to Sit: Maximum assistance;Assist x2    Balance:  Sitting: Impaired; With support  Sitting - Static: Poor (constant support)  Sitting - Dynamic: Poor (constant support)  Standing: Impaired; With support  Standing - Static: Constant support;Poor  Standing - Dynamic : Constant support;Poor    Pain Rating:  Pt moaning throughout session 2/2 stomach pain, did not formally rate    Activity Tolerance:   Poor  Please refer to the flowsheet for vital signs taken during this treatment. After treatment patient left in no apparent distress:   Supine in bed, Call bell within reach, Caregiver / family present, and Side rails x 3    COMMUNICATION/COLLABORATION:   The patients plan of care was discussed with: Occupational therapy assistant and Registered nurse. Problem: Mobility Impaired (Adult and Pediatric)  Goal: *Acute Goals and Plan of Care (Insert Text)  Description: Pt will be I with LE HEP in 7 days. Pt will perform bed mobility with min Ax1 in 7 days. Pt will perform transfers with min Ax1 in 7 days. Pt will amb 25-50 feet with LRAD safely with min Ax1 in 7 days.    Outcome: Progressing Towards Goal       Sammy Vázquez PTA   Time Calculation: 35 mins

## 2022-02-02 NOTE — PROGRESS NOTES
Progress Note     CHIEF COMPLAINT:     History: Tete Linares is a 79 y.o. WHITE/NON- female admitted on 1/29/2022 by Lakeshia Contreras MD whose history is given by a review of chart and the patient. We are asked by Hospitalist to see in consultation for atrial fibrillation. Review of Systems     A comprehensive review of systems was negative except for that written in the HPI. [address] [facility] [address]: 5     2/1/22: Patient is awake and alert this morning. She denies chest pain or discomfort. Continues to complain of abdominal pain, no bowel movement as of yet. Her daughter is at the bedside and has questions regarding her medications. 2/2/22: Resting comfortably this am. No acute events noted overnight. Case discussed with Dr. Vito Poole and our impression and plan are as follows:      1. Atrial fibrillation:   - in atrial flutter today per telemetry, HR 70-130s  - continue amiodarone and Eliquis  - continue telemetry  - will order EKG to monitor QTc.  - maintain electrolytes and replete as needed.       2. History of PE  - continue Eliquis    Medical Issues:  has no past medical history on file.      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    Hiatal hernia 01/29/2022    Hypotension 01/29/2022    Pleural effusion 01/29/2022        VITAL SIGNS:        Visit Vitals  BP 92/63 (BP 1 Location: Left upper arm, BP Patient Position: At rest;Supine)   Pulse 97   Temp 97.7 °F (36.5 °C)   Resp 18 Ht 5' 4\" (1.626 m)   Wt 46 kg (101 lb 6.6 oz)   SpO2 100%   BMI 17.41 kg/m²        Temp (24hrs), Av.8 °F (36.6 °C), Min:97.7 °F (36.5 °C), Max:98.1 °F (36.7 °C)         MEDICATIONS       Current Facility-Administered Medications:     lactulose (CHRONULAC) 10 gram/15 mL solution 30 mL, 30 mL, Oral, DAILY PRN, Rosalva Cabrera MD, 30 mL at 22 1420    guaiFENesin ER (MUCINEX) tablet 600 mg, 600 mg, Oral, Q12H, Nalini Daniels MD, 600 mg at 22 2238    albuterol-ipratropium (DUO-NEB) 2.5 MG-0.5 MG/3 ML, 3 mL, Nebulization, Q6HWA RT, Michael Alaniz DO, 3 mL at 22 0843    polyethylene glycol (MIRALAX) packet 17 g, 17 g, Oral, DAILY, Reggie Loya MD, 17 g at 22 1018    amiodarone (CORDARONE) tablet 200 mg, 200 mg, Oral, DAILY, Matheus Clinton MD, 200 mg at 22 1017    [Held by provider] pantoprazole (PROTONIX) tablet 40 mg, 40 mg, Oral, BID, Rosalva Cabrera MD, 40 mg at 22 1017    midodrine (PROAMATINE) tablet 5 mg, 5 mg, Oral, BID WITH MEALS, Matheus Peters MD, 5 mg at 22 1757    apixaban (ELIQUIS) tablet 5 mg, 5 mg, Oral, BID, Rosalva Cabrera MD, 5 mg at 22 2238    sodium chloride (NS) flush 5-40 mL, 5-40 mL, IntraVENous, Q8H, Matheus Clinton MD, 10 mL at 22 0523    sodium chloride (NS) flush 5-40 mL, 5-40 mL, IntraVENous, PRN, Rosalva Cabrera MD    acetaminophen (TYLENOL) tablet 650 mg, 650 mg, Oral, Q6H PRN **OR** acetaminophen (TYLENOL) suppository 650 mg, 650 mg, Rectal, Q6H PRN, Matheus Peters MD    polyethylene glycol (MIRALAX) packet 17 g, 17 g, Oral, DAILY PRN, Matheus Peters MD    ondansetron (ZOFRAN ODT) tablet 4 mg, 4 mg, Oral, Q8H PRN **OR** ondansetron (ZOFRAN) injection 4 mg, 4 mg, IntraVENous, Q6H PRN, Rosalva Cabrera MD      INTAKE / OUPUT     No intake or output data in the 24 hours ending 22 1121           EXAM:       .Physical Exam  Constitutional:       General: She is not in acute distress.      Appearance: She is ill-appearing. Cardiovascular:      Rate and Rhythm: Normal rate and regular rhythm. Pulses: Normal pulses. Heart sounds: Normal heart sounds. Pulmonary:      Effort: Pulmonary effort is normal. No respiratory distress. Breath sounds: Normal breath sounds. No stridor. No wheezing, rhonchi or rales. Abdominal:      General: Abdomen is flat. Bowel sounds are normal. There is no distension. Palpations: Abdomen is soft. Tenderness: There is no abdominal tenderness. There is no rebound. Skin:     General: Skin is warm and dry. Coloration: Skin is pale. Neurological:      General: No focal deficit present. Mental Status: She is alert and oriented to person, place, and time. Psychiatric:         Mood and Affect: Mood normal.         Behavior: Behavior normal.          DATA:      Recent Labs     02/02/22  0728 02/01/22  0721 01/31/22  1316   WBC 0.6* 0.6* 1.0*   HGB 9.5* 8.8* 8.9*    218 211     Recent Labs     02/01/22  0721 01/31/22  1316    140   K 3.3* 3.6    105   CO2 34* 33*   GLU 88 113*   BUN 23* 24*   CREA 0.43* 0.56   CA 7.7* 7.8*   ALB 1.7* 1.8*   ALT 73 80*            IMAGING:       Results from Hospital Encounter encounter on 01/29/22    XR CHEST PORT    Narrative  Chest one view. AP semiupright portable at 0925 dated 1/30/2022. Comparison 1/29/2022. A right arm PICC line remains in place. The heart is enlarged with calcified  plaque and tortuosity in the aorta. Right pleural fluid/pleural thickening is redemonstrated. There has been interval development of mild interstitial edema in perihilar and  lower lobe distributions. There is no lobar consolidation or pneumothorax. Impression  Comparison 1/29/2022. Interval development of mild pulmonary edema.       Results from [place] encounter on 01/29/22    CT ABD PELV W CONT    Narrative  Dose Reduction:  All CT scans at this facility are performed using dose reduction optimization  techniques as appropriate to a performed exam including the following: Automated  exposure control, adjustments of the mA and/or kV according to patient size, or  use of iterative reconstruction technique. IV contrast: 100 cc Isovue 370    TECHNIQUE: Contrast-enhanced images of chest, abdomen, and pelvis. MIP reformats  of thoracic CT angiography. CTA chest: Prominent enlargement of thyroid gland. Asymmetric extension of left  thyroid lobe with multiple calcified nodules to involve the superior and middle  mediastinum, with slight mass effect on the airway at the thoracic inlet and the  mid and upper thoracic esophagus. Calcified tortuous thoracic aorta, without  focal aneurysmal dilatation. Cardiomegaly with dilated left atrium and enlarged  right atrium and right ventricle. Reflux of contrast into dilated IVC and  hepatic veins, consistent with right-sided cardiac dysfunction. Right lower lobe  pulmonary arterial filling defects, consistent with pulmonary emboli, acute  versus subacute. Main pulmonary artery 2.5 cm. Small bilateral pleural  effusions. Hiatus hernia with partially intrathoracic stomach. Thin-walled  cavitary lesion, right middle lobe, possibly abscess or pneumatocele, less  likely neoplasm. Upper lobe/apical predominant emphysema. Diffuse bronchial wall  thickening with endobronchial opacities in the lower lobes bilaterally. No focal  lytic or blastic bone lesion. Degenerative changes of mid thoracic spine. CT abdomen/pelvis: Subcentimeter bilateral hepatic lobe lesions are  statistically likely benign but too small to characterize definitively. Layering  sludge is suspected in the gallbladder. No evidence of radiodense gallbladder  stones. Normal caliber bile duct. Borderline prominence of pancreatic duct. No  focal abnormality is seen involving the pancreas, spleen, or adrenal glands. Subcentimeter renal lesions, not well characterized.  No intrinsic abnormality of  the urinary bladder. Hysterectomy. Moderate rectal distention by stool. Mild  edema of perirectal fat. No evidence of intestinal wall thickening or mechanical  obstruction. Appendix not discretely visualized. No definite pneumoperitoneum. Atherosclerotic calcification and tortuosity of the aorta, without aneurysmal  dilatation. Prominent atherosclerotic calcification of iliac and femoral  arteries. No pathologic pelvic or retroperitoneal adenopathy. Degenerative  changes of the lumbar spine with convex leftward lumbar scoliosis. Impression  Positive for pulmonary embolus, right lower lobe, acute versus subacute; the  report communicated to [facility], 1451 hours. Cardiomegaly with bilateral  pleural effusions. Atherosclerosis. Hiatus hernia with partially intrathoracic  stomach. Distended gallbladder with sludge. Moderate rectal distention by stool. Degenerative changes of the spine. Convex leftward scoliosis, centered at the  L1-L2 level. Results for orders placed or performed during the hospital encounter of 01/29/22   EKG, 12 LEAD, INITIAL   Result Value Ref Range    Ventricular Rate 98 BPM    Atrial Rate 300 BPM    QRS Duration 82 ms    Q-T Interval 342 ms    QTC Calculation (Bezet) 436 ms    Calculated R Axis -10 degrees    Calculated T Axis -120 degrees    Diagnosis       Atrial fibrillation  Low voltage QRS  Septal infarct (cited on or before 22-JUN-2020)  Abnormal ECG  When compared with ECG of 22-JUN-2020 13:28,  Significant changes have occurred  Confirmed by Heraclio Robles ([id number]) on 1/31/2022 5:22:09 PM          History:     PMH:  has no past medical history on file. PSH:   has a past surgical history that includes pr egd deliver thermal energy sphnctr/cardia gerd. FHX: Family history is unknown by patient.      SHX:      ALL:   Allergies   Allergen Reactions    Penicillins Other (comments)        MEDS:   [x] Reviewed - As Below   [] Not reviewed    Current Facility-Administered Medications Medication    lactulose (CHRONULAC) 10 gram/15 mL solution 30 mL    guaiFENesin ER (MUCINEX) tablet 600 mg    albuterol-ipratropium (DUO-NEB) 2.5 MG-0.5 MG/3 ML    polyethylene glycol (MIRALAX) packet 17 g    amiodarone (CORDARONE) tablet 200 mg    [Held by provider] pantoprazole (PROTONIX) tablet 40 mg    midodrine (PROAMATINE) tablet 5 mg    apixaban (ELIQUIS) tablet 5 mg    sodium chloride (NS) flush 5-40 mL    sodium chloride (NS) flush 5-40 mL    acetaminophen (TYLENOL) tablet 650 mg    Or    acetaminophen (TYLENOL) suppository 650 mg    polyethylene glycol (MIRALAX) packet 17 g    ondansetron (ZOFRAN ODT) tablet 4 mg    Or    ondansetron (ZOFRAN) injection 4 mg         Obdulia Reyez NP       Please do not hesitate to call with any further questions or concerns. Dr. Robson Calderon [facility]  [address] [facility] [address] [place], [address] [facility]  [phone]

## 2022-02-02 NOTE — PROGRESS NOTES
Hospitalist Progress Note               Daily Progress Note: 2/2/2022      Subjective:   Hospital course to date: Patient is an 69-year-old female with a very complex medical history in recent months. She was admitted for COVID-19 in December 2021 and then hospitalized at [facility] on 1/2/2022 with altered mental status. She was treated with meropenem. That hospital stay was complicated by pulmonary embolism. She was discharged on 1/6 on oral anticoagulation. On 1/7 admitted to [facility] for hypoxic respiratory failure. CT showed new development of cavitary lesion lateral right middle lobe. She was seen by ID and started back on IV Merrem with an anticipated stop date of 2/4. During that hospital stay she underwent EGD which showed a large duodenal bulb ulcer. She was discharged from [facility] on 1/24    Patient then presents to our facility on 1/29 with abdominal pain and constipation    Patient has been seen by multiple consultants this hospital stay including pulmonology, cardiology, ID and hematology, the latter for leukopenia and neutropenia. There was concern that this was related to [facility] which has now been discontinued    ------    Patient seen today for follow-up. Her WBC count is unchanged at 0.6. Differential is still pending    She did not receive the enema yesterday and will be getting a shortly. Her daughter is requesting a consult with Dr. Ronald Moise, whom patient was to see next week in the office for follow-up in regards to her lung abscess.  However, he is actually already following the patient here    Problem List:  Problem List as of 2/2/2022 Date Reviewed: 1/29/2022          Codes Class Noted - Resolved    Pulmonary emboli ([facility] ICD-10-CM: I26.99  ICD-9-CM: 415.19  1/29/2022 - Present        * (Principal) Acute respiratory failure with hypoxia ([facility] ICD-10-CM: J96.01  ICD-9-CM: 518.81  1/29/2022 - Present        Lung abscess ([facility] ICD-10-CM: J85.2  ICD-9-CM: 513.0  1/29/2022 - Present        Deep vein thrombosis (DVT) of lower extremity (HCC) ICD-10-CM: I82.409  ICD-9-CM: 453.40  1/29/2022 - Present        Atrial fibrillation ([facility] ICD-10-CM: I48.91  ICD-9-CM: 427.31  1/29/2022 - Present        Constipation ICD-10-CM: K59.00  ICD-9-CM: 564.00  1/29/2022 - Present        GIB (gastrointestinal bleeding) (Chronic) ICD-10-CM: K92.2  ICD-9-CM: 578.9  1/29/2022 - Present    Overview Signed 1/29/2022  5:04 PM by Leo Jeans, MD     Recent             Chronic gastric ulcer (Chronic) ICD-10-CM: K25.7  ICD-9-CM: 531.70  1/29/2022 - Present    Overview Signed 1/29/2022  5:05 PM by Leo Jeans, MD     Recent             COVID-19 (Chronic) ICD-10-CM: U07.1  ICD-9-CM: 079.89  1/29/2022 - Present    Overview Signed 1/29/2022  5:05 PM by Leo Jeans, MD     Dec 2021             Anemia ICD-10-CM: D64.9  ICD-9-CM: 285.9  1/29/2022 - Present        Hypokalemia ICD-10-CM: E87.6  ICD-9-CM: 276.8  1/29/2022 - Present        Bilateral pleural effusion ICD-10-CM: J90  ICD-9-CM: 511.9  1/29/2022 - Present        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  ICD-9-CM: 751.60  1/29/2022 - Present        Hiatal hernia ICD-10-CM: K44.9  ICD-9-CM: 553.3  1/29/2022 - Present        Hypotension ICD-10-CM: I95.9  ICD-9-CM: 458.9  1/29/2022 - Present        Pleural effusion ICD-10-CM: J90  ICD-9-CM: 511.9  1/29/2022 - Present              Medications reviewed  Current Facility-Administered Medications   Medication Dose Route Frequency    lactulose (CHRONULAC) 10 gram/15 mL solution 30 mL  30 mL Oral DAILY PRN    guaiFENesin ER (MUCINEX) tablet 600 mg  600 mg Oral Q12H    albuterol-ipratropium (DUO-NEB) 2.5 MG-0.5 MG/3 ML  3 mL Nebulization Q6HWA RT    polyethylene glycol (MIRALAX) packet 17 g  17 g Oral DAILY    amiodarone (CORDARONE) tablet 200 mg  200 mg Oral DAILY    [Held by provider] pantoprazole (PROTONIX) tablet 40 mg  40 mg Oral BID    midodrine (PROAMATINE) tablet 5 mg  5 mg Oral BID WITH MEALS    apixaban (ELIQUIS) tablet 5 mg  5 mg Oral BID    sodium chloride (NS) flush 5-40 mL  5-40 mL IntraVENous Q8H    sodium chloride (NS) flush 5-40 mL  5-40 mL IntraVENous PRN    acetaminophen (TYLENOL) tablet 650 mg  650 mg Oral Q6H PRN    Or    acetaminophen (TYLENOL) suppository 650 mg  650 mg Rectal Q6H PRN    polyethylene glycol (MIRALAX) packet 17 g  17 g Oral DAILY PRN    ondansetron (ZOFRAN ODT) tablet 4 mg  4 mg Oral Q8H PRN    Or    ondansetron (ZOFRAN) injection 4 mg  4 mg IntraVENous Q6H PRN       Review of Systems:   A comprehensive review of systems was negative except for that written in the HPI. Objective:   Physical Exam:     Visit Vitals  BP 92/63 (BP 1 Location: Left upper arm, BP Patient Position: At rest;Supine)   Pulse 97   Temp 97.7 °F (36.5 °C)   Resp 18   Ht 5' 4\" (1.626 m)   Wt 46 kg (101 lb 6.6 oz)   SpO2 100%   BMI 17.41 kg/m²    O2 Flow Rate (L/min): 2 l/min O2 Device: Nasal cannula    Temp (24hrs), Av.8 °F (36.6 °C), Min:97.6 °F (36.4 °C), Max:98.1 °F (36.7 °C)    No intake/output data recorded. No intake/output data recorded. General:   Awake and alert   Lungs:   Clear to auscultation bilaterally. Chest wall:  No tenderness or deformity. Heart:  Regular rate and rhythm, S1, S2 normal, no murmur, click, rub or gallop. Abdomen:   Soft, non-tender. Bowel sounds normal. No masses,  No organomegaly. Extremities: Extremities normal, atraumatic, no cyanosis or edema. Pulses: 2+ and symmetric all extremities. Skin: Skin color, texture, turgor normal. No rashes or lesions   Neurologic: CNII-XII intact.   No gross focal deficits         Data Review:       Recent Days:  Recent Labs     22  0728 22  0721 22  1316   WBC 0.6* 0.6* 1.0*   HGB 9.5* 8.8* 8.9*   HCT 30.4* 28.4* 28.3*    218 211     Recent Labs     22  0721 22  1316    140   K 3.3* 3.6    105   CO2 34* 33*   GLU 88 113*   BUN 23* 24*   CREA 0.43* 0.56   CA 7.7* 7.8*   ALB 1.7* 1.8*   TBILI 2.0* 1.9*   ALT 73 80*     No results for input(s): PH, PCO2, PO2, HCO3, FIO2 in the last 72 hours. 24 Hour Results:  Recent Results (from the past 24 hour(s))   CBC WITH AUTOMATED DIFF    Collection Time: 02/02/22  7:28 AM   Result Value Ref Range    WBC 0.6 (LL) 3.6 - 11.0 K/uL    RBC 3.04 (L) 3.80 - 5.20 M/uL    HGB 9.5 (L) 11.5 - 16.0 g/dL    HCT 30.4 (L) 35.0 - 47.0 %    .0 (H) 80.0 - 99.0 FL    MCH 31.3 26.0 - 34.0 PG    MCHC 31.3 30.0 - 36.5 g/dL    RDW 19.3 (H) 11.5 - 14.5 %    PLATELET 271 373 - 697 K/uL    MPV 10.9 8.9 - 12.9 FL    NRBC 0.0 0.0  WBC    ABSOLUTE NRBC 0.00 0.00 - 0.01 K/uL    NEUTROPHILS PENDING %    LYMPHOCYTES PENDING %    MONOCYTES PENDING %    EOSINOPHILS PENDING %    BASOPHILS PENDING %    IMMATURE GRANULOCYTES PENDING %    ABS. NEUTROPHILS PENDING K/UL    ABS. LYMPHOCYTES PENDING K/UL    ABS. MONOCYTES PENDING K/UL    ABS. EOSINOPHILS PENDING K/UL    ABS. BASOPHILS PENDING K/UL    ABS. IMM. GRANS. PENDING K/UL    DF PENDING        XR CHEST PORT   Final Result   Comparison 1/29/2022. Interval development of mild pulmonary edema. CTA CHEST W OR W WO CONT   Final Result   Positive for pulmonary embolus, right lower lobe, acute versus subacute; the   report communicated to [facility], 1451 hours. Cardiomegaly with bilateral   pleural effusions. Atherosclerosis. Hiatus hernia with partially intrathoracic   stomach. Distended gallbladder with sludge. Moderate rectal distention by stool. Degenerative changes of the spine. Convex leftward scoliosis, centered at the   L1-L2 level. CT ABD PELV W CONT   Final Result   Positive for pulmonary embolus, right lower lobe, acute versus subacute; the   report communicated to [facility], 1451 hours. Cardiomegaly with bilateral   pleural effusions. Atherosclerosis. Hiatus hernia with partially intrathoracic   stomach. Distended gallbladder with sludge. Moderate rectal distention by stool. Degenerative changes of the spine. Convex leftward scoliosis, centered at the   L1-L2 level. XR CHEST PORT   Final Result   Comparison 6/22/2020. Central line position as above. Right hemithorax pleural fluid/pleural thickening. Additional chronic findings   as above. 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 On Eliquis    Peptic ulcer disease with recent large duodenal bulb ulcer      Plan:  Continue supportive care  Hematology following, was given Granix yesterday  Follow CBC    Discussed with ID who indicates no further antibiotics are planned. PICC line will be discontinued prior to discharge      Care Plan discussed with: Patient/Family    Disposition: Possible SNF, awaiting some improvement in WBC      Total time spent with patient: 30 minutes.     Mindi Bruner MD

## 2022-02-02 NOTE — PROGRESS NOTES
Problem: Pressure Injury - Risk of  Goal: *Prevention of pressure injury  Description: Document Zeyad Scale and appropriate interventions in the flowsheet. Outcome: Progressing Towards Goal  Note: Pressure Injury Interventions:  Sensory Interventions: Assess changes in LOC,Keep linens dry and wrinkle-free,Maintain/enhance activity level,Minimize linen layers    Moisture Interventions: Absorbent underpads,Apply protective barrier, creams and emollients,Internal/External urinary devices    Activity Interventions: Increase time out of bed,PT/OT evaluation    Mobility Interventions: HOB 30 degrees or less,PT/OT evaluation    Nutrition Interventions: Document food/fluid/supplement intake,Offer support with meals,snacks and hydration,Discuss nutritional consult with provider                     Problem: Patient Education: Go to Patient Education Activity  Goal: Patient/Family Education  Outcome: Progressing Towards Goal     Problem: Falls - Risk of  Goal: *Absence of Falls  Description: Document Esther Fall Risk and appropriate interventions in the flowsheet.   Outcome: Progressing Towards Goal  Note: Fall Risk Interventions:       Mentation Interventions: Adequate sleep, hydration, pain control,Bed/chair exit alarm,Door open when patient unattended    Medication Interventions: Bed/chair exit alarm,Patient to call before getting OOB    Elimination Interventions: Bed/chair exit alarm,Call light in reach              Problem: Patient Education: Go to Patient Education Activity  Goal: Patient/Family Education  Outcome: Progressing Towards Goal

## 2022-02-02 NOTE — PROGRESS NOTES
Subjective   Subjective:      HPI : Patient feeling weak. Patient still having constipation. No fever or chills.      Objective     Current Facility-Administered Medications:     tbo-filgrastim (GRANIX) injection 300 mcg, 300 mcg, SubCUTAneous, DAILY, Tanya Murillo MD    lactulose (CHRONULAC) 10 gram/15 mL solution 30 mL, 30 mL, Oral, DAILY PRN, Lucien Uribe MD, 30 mL at 01/31/22 1420    guaiFENesin ER (MUCINEX) tablet 600 mg, 600 mg, Oral, Q12H, Nalini Daniels MD, 600 mg at 02/01/22 2238    albuterol-ipratropium (DUO-NEB) 2.5 MG-0.5 MG/3 ML, 3 mL, Nebulization, Q6HWA RT, Michael Alaniz DO, 3 mL at 02/02/22 0843    polyethylene glycol (MIRALAX) packet 17 g, 17 g, Oral, DAILY, Reggie Loya MD, 17 g at 02/01/22 1018    amiodarone (CORDARONE) tablet 200 mg, 200 mg, Oral, DAILY, Juju Clinton MD, 200 mg at 02/01/22 1017    [Held by provider] pantoprazole (PROTONIX) tablet 40 mg, 40 mg, Oral, BID, Lucien Uribe MD, 40 mg at 02/01/22 1017    midodrine (PROAMATINE) tablet 5 mg, 5 mg, Oral, BID WITH MEALS, Juju Chu MD, 5 mg at 02/01/22 1757    apixaban (ELIQUIS) tablet 5 mg, 5 mg, Oral, BID, Lucien Uribe MD, 5 mg at 02/01/22 2238    sodium chloride (NS) flush 5-40 mL, 5-40 mL, IntraVENous, Q8H, Juju Clinton MD, 10 mL at 02/02/22 0523    sodium chloride (NS) flush 5-40 mL, 5-40 mL, IntraVENous, PRN, Lucien Uribe MD    acetaminophen (TYLENOL) tablet 650 mg, 650 mg, Oral, Q6H PRN **OR** acetaminophen (TYLENOL) suppository 650 mg, 650 mg, Rectal, Q6H PRN, Juju Chu MD    polyethylene glycol (MIRALAX) packet 17 g, 17 g, Oral, DAILY PRN, Juju Chu MD    ondansetron (ZOFRAN ODT) tablet 4 mg, 4 mg, Oral, Q8H PRN **OR** ondansetron (ZOFRAN) injection 4 mg, 4 mg, IntraVENous, Q6H PRN, Lucien Uribe MD    Objective:     Visit Vitals  BP 92/63 (BP 1 Location: Left upper arm, BP Patient Position: At rest;Supine)   Pulse 97   Temp 97.7 °F (36.5 °C)   Resp 18   Ht 5' 4\" (1.626 m) Wt 46 kg (101 lb 6.6 oz)   SpO2 100%   BMI 17.41 kg/m²      Physical Exam   Lungs:CTA  HEART:RRR  ABD;NT,BS+  EXT:no edema    @[email or web address]    [email or web address]     Data Review:   Recent Results (from the past 24 hour(s))   CBC WITH AUTOMATED DIFF    Collection Time: 02/02/22  7:28 AM   Result Value Ref Range    WBC 0.6 (LL) 3.6 - 11.0 K/uL    RBC 3.04 (L) 3.80 - 5.20 M/uL    HGB 9.5 (L) 11.5 - 16.0 g/dL    HCT 30.4 (L) 35.0 - 47.0 %    .0 (H) 80.0 - 99.0 FL    MCH 31.3 26.0 - 34.0 PG    MCHC 31.3 30.0 - 36.5 g/dL    RDW 19.3 (H) 11.5 - 14.5 %    PLATELET 588 169 - 113 K/uL    MPV 10.9 8.9 - 12.9 FL    NRBC 0.0 0.0  WBC    ABSOLUTE NRBC 0.00 0.00 - 0.01 K/uL    NEUTROPHILS PENDING %    LYMPHOCYTES PENDING %    MONOCYTES PENDING %    EOSINOPHILS PENDING %    BASOPHILS PENDING %    IMMATURE GRANULOCYTES PENDING %    ABS. NEUTROPHILS PENDING K/UL    ABS. LYMPHOCYTES PENDING K/UL    ABS. MONOCYTES PENDING K/UL    ABS. EOSINOPHILS PENDING K/UL    ABS. BASOPHILS PENDING K/UL    ABS. IMM. GRANS. PENDING K/UL    DF PENDING        Leukopenia neutropenia:Most likely due to meropenem. Less likely due to bone marrow pathology. Meropenem d/cd by ID. Continue granix. Neutropenic precautions. Pt afebrile. Risk of infections discussed with pt daughter. Normocytic hypochromic anemia:check iron studies,SPEP. Hb improving. Pt folate low. Start folic acid 1mg every day. Constipation:Mangement per primary team.Pt getting enema today. D/w pt nurse.      Acute respiratory failure with hypoxia:improving.      Pulmonary emboli  and DVT:continue eliquis. Monitor for bleeding closely. Lung abscess:S/p Meropenem. ID following. Better. D/w ID.     Distended gall bladder     H/o GI bleeding:no active bleeding clinically. D/w pt and pt daughter.   D/w primary team.                   Plan:

## 2022-02-02 NOTE — PROGRESS NOTES
Infectious Diseases Progress Note  Alex Sanabria MD  [phone]    Date:2022       Room:[facility]  Patient Martha Klein     YOB: 1940     Age:81 y.o. 81F with past medical history of hypertension hyperlipidemia CVA. Reportedly positive for coronavirus the first week of 2021.     22 presents to [facility] with altered mental status. During the ED course was found to have an infiltrate on chest imaging, and started on broad spectrum antibiotics. Admitted to hospital and improved during the hospital course with meropenem. Hospital course further complicated by pulmonary embolism. 22 discharged from hospital on oral anticoagulation.     22 returns to hospital for acute hypoxic respiratory failure. CT reveals new development of Cavitary lesion of the inferior lateral right middle lobe. Admitted to hospital and I have been asked to see her in consultation.     1/10/22 underwent EGD with Dr Ivone Lomeli:   Large duodenal bulb ulcer - injected and cauterized  Hiatal hernia  Recommendations:   PPI continuous infusion  NPO, IVF     For the cavitary pneumonia I had her on empiric meropenem with an anticipated stop date of 22.  22 [facility]     22 presents to [facility] because of lower abdominal pains. Reports constipation for the past few days. No improvement with enemas at home. Admitted to hospital for stabilization and further workup of her condition. Subjective    Subjective:  Symptoms:  Stable. Review of Systems   All other systems reviewed and are negative.     Objective         Vitals Last 24 Hours:  TEMPERATURE:  Temp  Av.8 °F (36.6 °C)  Min: 97.7 °F (36.5 °C)  Max: 98.1 °F (36.7 °C)  RESPIRATIONS RANGE: Resp  Av  Min: 16  Max: 18  PULSE OXIMETRY RANGE: SpO2  Av.6 %  Min: 90 %  Max: 100 %  PULSE RANGE: Pulse  Av.2  Min: 61  Max: 97  BLOOD PRESSURE RANGE: Systolic (75PGK), CRE:30 , Min:91 , CTF:70   ; Diastolic (24hrs), Av, Min:56, Max:72    I/O (24Hr): No intake or output data in the 24 hours ending 22 1301  Objective:  General Appearance:  Comfortable. Vital signs: (most recent): Blood pressure 92/63, pulse 97, temperature 97.7 °F (36.5 °C), resp. rate 18, height 5' 4\" (1.626 m), weight 46 kg (101 lb 6.6 oz), SpO2 100 %. Vital signs are normal.    HEENT: Normal HEENT exam.    Lungs:  Normal effort and normal respiratory rate. Heart: Normal rate. Abdomen: Abdomen is soft. Neurological: Patient is alert. Skin:  Warm and dry. Labs/Imaging/Diagnostics    Labs:  CBC:  Recent Labs     22  0728 22  0722  1316   WBC 0.6* 0.6* 1.0*   RBC 3.04* 2.87* 2.86*   HGB 9.5* 8.8* 8.9*   HCT 30.4* 28.4* 28.3*   .0* 99.0 99.0   RDW 19.3* 19.7* 19.8*    218 211     CHEMISTRIES:  Recent Labs     22  0721 22  1316    140   K 3.3* 3.6    105   CO2 34* 33*   BUN 23* 24*   CA 7.7* 7.8*   PT/INR:  No results for input(s): INR, INREXT, INREXT in the last 72 hours. No lab exists for component: PROTIME  APTT:  No results for input(s): APTT in the last 72 hours. LIVER PROFILE:  Recent Labs     22  0721 22  1316   AST 57* 76*   ALT 73 80*     Lab Results   Component Value Date/Time    ALT (SGPT) 73 2022 07:21 AM    AST (SGOT) 57 (H) 2022 07:21 AM    Alk. phosphatase 97 2022 07:21 AM    Bilirubin, total 2.0 (H) 2022 07:21 AM       Imaging Last 24 Hours:  No results found.   Assessment//Plan   Principal Problem:    Acute respiratory failure with hypoxia ([address] (2022)    Active Problems:    Pulmonary emboli ([address] (2022)      Lung abscess ([address] (2022)      Deep vein thrombosis (DVT) of lower extremity (HCC) (2022)      Atrial fibrillation ([address] (2022)      Constipation (2022)      GIB (gastrointestinal bleeding) (2022)      Overview: Recent      Chronic gastric ulcer (2022)      Overview: Recent      COVID-19 (1/29/2022)      Overview: Dec 2021      Anemia (1/29/2022)      Hypokalemia (1/29/2022)      Bilateral pleural effusion (1/29/2022)      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 Reportedly positive for coronavirus the first week of december 2021.     1/2/22 presents to [facility] with altered mental status. During the ED course was found to have an infiltrate on chest imaging, and started on broad spectrum antibiotics. Admitted to hospital and improved during the hospital course with meropenem. Hospital course further complicated by pulmonary embolism. 1/6/22 discharged from hospital on oral anticoagulation.     1/7/22 returns to hospital for acute hypoxic respiratory failure. CT reveals new development of Cavitary lesion of the inferior lateral right middle lobe. Admitted to hospital and I have been asked to see her in consultation.     1/10/22 underwent EGD with Dr Karo Hammer:   Large duodenal bulb ulcer - injected and cauterized  Hiatal hernia  Recommendations:   PPI continuous infusion  NPO, IVF     For the cavitary pneumonia I had her on empiric meropenem with an anticipated stop date of 2/4/22.  1/24/22 discharged from [facility]     1/29/22 presents to [facility] because of lower abdominal pains. Reports constipation for the past few days. No improvement with enemas at home.  Admitted to hospital for stabilization and further workup of her condition.     MICROBIOLOGY     1/2/22              Covid 19          Positive  1/2/22              Blood               Negative  1/3/22              Urine                Negative  1/7/22              Blood               Negative    1/29/22            Covid 19          Negative  1/29/22            Blood               Negative     ASSESSMENT AND RECOMMENDATIONS     1) Pneumonia with development in the setting of SARS-CoV-2 coronaviral respiratory syndrome. Further complicated by new development of right sided cavitary lesion, improved with a long course of meropenem.                 Repeat CT chest shows improvement in her pulmonary condition              Meropenem iv through 2/1/22    2) Development of neutropenia during this hospital stay.      No objection to [employer] signed by Malu Marin MD on 2/2/2022 at 12:17 PM

## 2022-02-02 NOTE — PROGRESS NOTES
Pulmonary/CC Progress Note  Elderly patient with history of extensive tobacco abuse, COPD recently treated for COVID-19 pneumonia in December 2021. Recently she was hospitalized with right middle lobe abscess and had been on IV meropenem. CT scan showed subacute thrombus    Subjective:   Daily Progress Note: 2/2/2022 4:16 PM    Patient without significant distress. Afebrile. Offers no new complaints. Overall she is weak  Daughter present at bedside. Patient is going to get enema today for her constipation.     Current Facility-Administered Medications   Medication Dose Route Frequency    tbo-filgrastim (GRANIX) injection 300 mcg  300 mcg SubCUTAneous DAILY    lactulose (CHRONULAC) 10 gram/15 mL solution 30 mL  30 mL Oral DAILY PRN    guaiFENesin ER (MUCINEX) tablet 600 mg  600 mg Oral Q12H    albuterol-ipratropium (DUO-NEB) 2.5 MG-0.5 MG/3 ML  3 mL Nebulization Q6HWA RT    polyethylene glycol (MIRALAX) packet 17 g  17 g Oral DAILY    amiodarone (CORDARONE) tablet 200 mg  200 mg Oral DAILY    [Held by provider] pantoprazole (PROTONIX) tablet 40 mg  40 mg Oral BID    midodrine (PROAMATINE) tablet 5 mg  5 mg Oral BID WITH MEALS    apixaban (ELIQUIS) tablet 5 mg  5 mg Oral BID    sodium chloride (NS) flush 5-40 mL  5-40 mL IntraVENous Q8H    sodium chloride (NS) flush 5-40 mL  5-40 mL IntraVENous PRN    acetaminophen (TYLENOL) tablet 650 mg  650 mg Oral Q6H PRN    Or    acetaminophen (TYLENOL) suppository 650 mg  650 mg Rectal Q6H PRN    polyethylene glycol (MIRALAX) packet 17 g  17 g Oral DAILY PRN    ondansetron (ZOFRAN ODT) tablet 4 mg  4 mg Oral Q8H PRN    Or    ondansetron (ZOFRAN) injection 4 mg  4 mg IntraVENous Q6H PRN        Review of Systems  Unchanged from initial consult    Objective:     Visit Vitals  BP 92/63 (BP 1 Location: Left upper arm, BP Patient Position: At rest;Supine)   Pulse 97   Temp 97.7 °F (36.5 °C)   Resp 18   Ht 5' 4\" (1.626 m)   Wt 46 kg (101 lb 6.6 oz)   SpO2 100% BMI 17.41 kg/m²    O2 Flow Rate (L/min): 2 l/min O2 Device: Nasal cannula    Temp (24hrs), Av.8 °F (36.6 °C), Min:97.7 °F (36.5 °C), Max:98.1 °F (36.7 °C)      No intake/output data recorded. No intake/output data recorded. HEENT: Normal HEENT exam.    Lungs:  Normal effort and normal respiratory rate. Heart: Normal rate. Abdomen: Abdomen is soft. Neurological: Patient is alert. Skin:  Warm and dry. Lab/Data Review:  Recent Labs     22  0728 22  0721 22  1316   WBC 0.6* 0.6* 1.0*   HGB 9.5* 8.8* 8.9*   HCT 30.4* 28.4* 28.3*    218 211     Recent Labs     22  0721 22  1316    140   K 3.3* 3.6    105   CO2 34* 33*   GLU 88 113*   BUN 23* 24*   CREA 0.43* 0.56   CA 7.7* 7.8*   ALB 1.7* 1.8*   TBILI 2.0* 1.9*   ALT 73 80*     No results for input(s): PH, PCO2, PO2, HCO3, FIO2 in the last 72 hours. Diagnostics   CXR Results  (Last 48 hours)    None             Assessment/Plan:     Principal Problem:    Acute respiratory failure with hypoxia ([address] (2022)    Active Problems:    Pulmonary emboli ([address] (2022)      Lung abscess ([address] (2022)      Deep vein thrombosis (DVT) of lower extremity (HCC) (2022)      Atrial fibrillation ([address] (2022)      Constipation (2022)      GIB (gastrointestinal bleeding) (2022)      Overview: Recent      Chronic gastric ulcer (2022)      Overview: Recent      COVID-19 (2022)      Overview: Dec 2021      Anemia (2022)      Hypokalemia (2022)      Bilateral pleural effusion (2022)      Gallbladder anomaly (2022)      Hiatal hernia (2022)      Hypotension (2022)      Pleural effusion (2022)      1. Pneumonia. An 44-year-old frail  female with a history of very extensive tobacco abuse. She has underlying chronic obstructive pulmonary disease but does not appear to be in exacerbation.   She had a recent hospitalization in [facility] a couple of times. In 12/2021, she had COVID-19 pneumonia. More recently hospitalized with right middle lobe abscess. Patient underwent CT-guided drainage of abscess only to 3 cc of foul-smelling fluid was drained after which she improved, WBC count came back to normal and she was discharged from the hospital on IV meropenem. .     Also venous thromboembolism and has been on Eliquis. Meropenem and Eliquis have been resumed. CTA shows subacute thrombus. There is no focal airspace disease. From pulmonary perspective patient can be discharged to home. I spoke to patient's daughter at bedside, patient has arrangement for hospital bed at home, she is also has arrangement for home health care and physical therapy. Continue with oxygen 2 L which she already has at home. Her oral intake has been poor and there is apparently some weight loss. 2.  Small to moderate bilateral pleural effusions. She appears to be third spacing. She also has dependent edema. Albumin is 1.7 only. Her EF is normal.  I believe that her effusions and third spacing is due to hypoalbuminemia and malnutrition. 3.  Atrial fibrillation and venous thromboembolism. Continue with Eliquis. Monitor hemoglobin. 4.  History of gastrointestinal bleed due to gastric ulcer, status post cauterization at [facility]. Continue with Protonix p.o. b.i.d. She also has gallbladder sludge. 5.  Constipation. Further management as per primary attending. Patient getting Fleet enema today  6. Enlarged thyroid and part of that appears to be retrosternal.  She also has mediastinal calcified lymphadenopathy. I believe this is all part and parcel of prior granulomatous disease. No acute problems.     Care Plan discussed with: Patient's daughter at [facility]

## 2022-02-02 NOTE — PROGRESS NOTES
OCCUPATIONAL THERAPY TREATMENT  Patient: Ulysses Crochet (59 y.o. female)  Date: 2/2/2022  Diagnosis: Acute respiratory failure with hypoxia ([facility] [address] [J96.01]  Pleural effusion [J90]  Lung abscess ([facility] [address] [J85.2] Acute respiratory failure with hypoxia [facility])       Precautions: Fall  Chart, occupational therapy assessment, plan of care, and goals were reviewed. ASSESSMENT  Patient continues with skilled OT services and is progressing towards goals. Upon NG/PTA arrival, pt semi supine in bed with daughter at bedside and agreeable to tx session. Pt moaning throughout session due to stomach pain, daughter reporting that pt is having trouble having a BM. Pt completed supine>sit with MaxA. Pt noted with poor sitting balance at EOB. Pt completed sit>stand from EOB with MaxA x2 and bed>BSC with total A x2. Pt sat on [facility] with daughter holding pt upright and therapist assisting in front with sitting balance. Pt required increased time for toileting. Pt completed sit>stand from [facility] x2 trials during session with MaxA x2, bowel hygiene completed in standing with total A. Pt completed BSC>EOB with total A x2 and sit>supine with MaxA x2. Daughter voicing concerns about pt having difficulty having BM, notified RN. Pt left semi supine in bed with call bell within reach and daughter at bedside. Will continue to follow pt throughout remainder of stay and progress towards OT goals. Recommending SNF at discharge when medically appropriate. Current Level of Function Impacting Discharge (ADLs): Max-MaxA x2 bed mobility, Max-total A x2 transfers, total A toileting    Other factors to consider for discharge: family support, DME, time since onset, severity of deficits          PLAN :  Patient continues to benefit from skilled intervention to address the above impairments. Continue treatment per established plan of care. to address goals.     Recommendation for discharge: (in order for the patient to meet his/her long term goals)  Krishan Driscoll    This discharge recommendation:  Has been made in collaboration with the attending provider and/or case management    IF patient discharges home will need the following DME: TBD       SUBJECTIVE:   Patient stated Devin Alejandro it hurts so bad.     OBJECTIVE DATA SUMMARY:   Cognitive/Behavioral Status:  Neurologic State: Alert     Cognition: Follows commands    Functional Mobility and Transfers for ADLs:  Bed Mobility:  Supine to Sit: Maximum assistance  Sit to Supine: Maximum assistance;Assist x2  Scooting: Maximum assistance    Transfers:  Sit to Stand: Maximum assistance;Assist x2  Functional Transfers  Toilet Transfer : Maximum assistance; Total assistance;Assist x2    Balance:  Sitting: Impaired; With support  Sitting - Static: Poor (constant support)  Sitting - Dynamic: Poor (constant support)  Standing: Impaired; With support  Standing - Static: Constant support;Poor  Standing - Dynamic : Constant support;Poor    ADL Intervention:  Toileting  Bowel Hygiene: Total assistance (dependent)    Pain:  Pt moaning throughout session 2/2 stomach pain, did not formally rate    Activity Tolerance:   Fair and requires rest breaks  Please refer to the flowsheet for vital signs taken during this treatment. After treatment patient left in no apparent distress:   Supine in bed, Call bell within reach, and Caregiver / family present    COMMUNICATION/COLLABORATION:   The patients plan of care was discussed with: Physical therapy assistant and Registered nurse. [place] with PT for increased safety for pt and clinician.     HERNANDEZ Solano  Time Calculation: 35 mins    Problem: Self Care Deficits Care Plan (Adult)  Goal: *Acute Goals and Plan of Care (Insert Text)  Description: Pt will be Mod I sup <> sit in prep for EOB ADLs  Pt will be Mod I grooming standing sink side LRAD  Pt will be Mod I LE dressing sitting EOB/long sit  Pt will be Mod I sit <>  prep for toileting LRAD  Pt will be Mod I toileting/toilet transfer/cloth mgmt LRAD  Pt will be IND following UE HEP in prep for self care tasks    Outcome: Progressing Towards Goal

## 2022-02-03 LAB
BASOPHILS # BLD: 0 K/UL (ref 0–0.1)
BASOPHILS NFR BLD: 0 % (ref 0–1)
DIFFERENTIAL METHOD BLD: ABNORMAL
EOSINOPHIL # BLD: 0 K/UL (ref 0–0.4)
EOSINOPHIL NFR BLD: 4 % (ref 0–7)
ERYTHROCYTE [DISTWIDTH] IN BLOOD BY AUTOMATED COUNT: 19 % (ref 11.5–14.5)
HCT VFR BLD AUTO: 28.4 % (ref 35–47)
HGB BLD-MCNC: 8.8 G/DL (ref 11.5–16)
IMM GRANULOCYTES # BLD AUTO: 0 K/UL
IMM GRANULOCYTES NFR BLD AUTO: 0 %
LDH SERPL L TO P-CCNC: 258 U/L (ref 81–246)
LYMPHOCYTES # BLD: 0.4 K/UL (ref 0.8–3.5)
LYMPHOCYTES NFR BLD: 75 % (ref 12–49)
MCH RBC QN AUTO: 31.1 PG (ref 26–34)
MCHC RBC AUTO-ENTMCNC: 31 G/DL (ref 30–36.5)
MCV RBC AUTO: 100.4 FL (ref 80–99)
MONOCYTES # BLD: 0.1 K/UL (ref 0–1)
MONOCYTES NFR BLD: 19 % (ref 5–13)
NEUTS SEG # BLD: 0 K/UL (ref 1.8–8)
NEUTS SEG NFR BLD: 2 % (ref 32–75)
NRBC # BLD: 0 K/UL (ref 0–0.01)
NRBC BLD-RTO: 0 PER 100 WBC
PLATELET # BLD AUTO: 226 K/UL (ref 150–400)
PMV BLD AUTO: 11.3 FL (ref 8.9–12.9)
RBC # BLD AUTO: 2.83 M/UL (ref 3.8–5.2)
RBC MORPH BLD: ABNORMAL
RETICS # AUTO: 0.04 M/UL (ref 0.02–0.08)
RETICS/RBC NFR AUTO: 1.4 % (ref 0.7–2.1)
WBC # BLD AUTO: 0.5 K/UL (ref 3.6–11)

## 2022-02-03 PROCEDURE — 74011250637 HC RX REV CODE- 250/637: Performed by: INTERNAL MEDICINE

## 2022-02-03 PROCEDURE — 51798 US URINE CAPACITY MEASURE: CPT

## 2022-02-03 PROCEDURE — 65270000029 HC RM PRIVATE

## 2022-02-03 PROCEDURE — 94760 N-INVAS EAR/PLS OXIMETRY 1: CPT

## 2022-02-03 PROCEDURE — 74011250636 HC RX REV CODE- 250/636: Performed by: INTERNAL MEDICINE

## 2022-02-03 PROCEDURE — 74011000250 HC RX REV CODE- 250: Performed by: INTERNAL MEDICINE

## 2022-02-03 PROCEDURE — 77010033678 HC OXYGEN DAILY

## 2022-02-03 PROCEDURE — 83615 LACTATE (LD) (LDH) ENZYME: CPT

## 2022-02-03 PROCEDURE — 94640 AIRWAY INHALATION TREATMENT: CPT

## 2022-02-03 PROCEDURE — 85045 AUTOMATED RETICULOCYTE COUNT: CPT

## 2022-02-03 PROCEDURE — 36415 COLL VENOUS BLD VENIPUNCTURE: CPT

## 2022-02-03 PROCEDURE — 85025 COMPLETE CBC W/AUTO DIFF WBC: CPT

## 2022-02-03 RX ORDER — POTASSIUM CHLORIDE 20 MEQ/1
40 TABLET, EXTENDED RELEASE ORAL
Status: COMPLETED | OUTPATIENT
Start: 2022-02-03 | End: 2022-02-03

## 2022-02-03 RX ADMIN — POTASSIUM CHLORIDE 40 MEQ: 1500 TABLET, EXTENDED RELEASE ORAL at 17:15

## 2022-02-03 RX ADMIN — IPRATROPIUM BROMIDE AND ALBUTEROL SULFATE 3 ML: .5; 2.5 SOLUTION RESPIRATORY (INHALATION) at 08:13

## 2022-02-03 RX ADMIN — IPRATROPIUM BROMIDE AND ALBUTEROL SULFATE 3 ML: .5; 2.5 SOLUTION RESPIRATORY (INHALATION) at 13:26

## 2022-02-03 RX ADMIN — ACETAMINOPHEN 650 MG: 325 TABLET ORAL at 08:45

## 2022-02-03 RX ADMIN — APIXABAN 5 MG: 5 TABLET, FILM COATED ORAL at 08:39

## 2022-02-03 RX ADMIN — APIXABAN 5 MG: 5 TABLET, FILM COATED ORAL at 21:00

## 2022-02-03 RX ADMIN — TBO-FILGRASTIM 300 MCG: 300 INJECTION, SOLUTION SUBCUTANEOUS at 08:45

## 2022-02-03 RX ADMIN — SODIUM CHLORIDE, PRESERVATIVE FREE 10 ML: 5 INJECTION INTRAVENOUS at 21:00

## 2022-02-03 RX ADMIN — MIDODRINE HYDROCHLORIDE 5 MG: 5 TABLET ORAL at 17:15

## 2022-02-03 RX ADMIN — AMIODARONE HYDROCHLORIDE 200 MG: 200 TABLET ORAL at 08:39

## 2022-02-03 RX ADMIN — SODIUM CHLORIDE, PRESERVATIVE FREE 10 ML: 5 INJECTION INTRAVENOUS at 06:21

## 2022-02-03 RX ADMIN — GUAIFENESIN 600 MG: 600 TABLET, EXTENDED RELEASE ORAL at 21:00

## 2022-02-03 RX ADMIN — GUAIFENESIN 600 MG: 600 TABLET, EXTENDED RELEASE ORAL at 08:39

## 2022-02-03 RX ADMIN — IPRATROPIUM BROMIDE AND ALBUTEROL SULFATE 3 ML: .5; 2.5 SOLUTION RESPIRATORY (INHALATION) at 19:42

## 2022-02-03 RX ADMIN — FOLIC ACID 1 MG: 1 TABLET ORAL at 08:39

## 2022-02-03 RX ADMIN — SODIUM CHLORIDE, PRESERVATIVE FREE 10 ML: 5 INJECTION INTRAVENOUS at 13:59

## 2022-02-03 RX ADMIN — MIDODRINE HYDROCHLORIDE 5 MG: 5 TABLET ORAL at 08:39

## 2022-02-03 NOTE — PROGRESS NOTES
Infectious Diseases Progress Note  Alex Barry MD  [phone]    Date:2/3/2022       Room:[facility]  Patient Elvia Mckeon     YOB: 1940     Age:81 y.o. 81F with past medical history of hypertension hyperlipidemia CVA. Reportedly positive for coronavirus the first week of 2021.     22 presents to [facility] with altered mental status. During the ED course was found to have an infiltrate on chest imaging, and started on broad spectrum antibiotics. Admitted to hospital and improved during the hospital course with meropenem. Hospital course further complicated by pulmonary embolism. 22 discharged from hospital on oral anticoagulation.     22 returns to hospital for acute hypoxic respiratory failure. CT reveals new development of Cavitary lesion of the inferior lateral right middle lobe. Admitted to hospital and I have been asked to see her in consultation.     1/10/22 underwent EGD with Dr Akin Samayoa:   Large duodenal bulb ulcer - injected and cauterized  Hiatal hernia  Recommendations:   PPI continuous infusion  NPO, IVF     For the cavitary pneumonia I had her on empiric meropenem with an anticipated stop date of 22.  22 discharged from [facility]     22 presents to [facility] because of lower abdominal pains. Reports constipation for the past few days. No improvement with enemas at home. Admitted to hospital for stabilization and further workup of her condition. Subjective    Subjective:  Symptoms:  Stable. Review of Systems   All other systems reviewed and are negative.     Objective         Vitals Last 24 Hours:  TEMPERATURE:  Temp  Av.5 °F (36.4 °C)  Min: 97.3 °F (36.3 °C)  Max: 97.8 °F (36.6 °C)  RESPIRATIONS RANGE: Resp  Av.1  Min: 14  Max: 18  PULSE OXIMETRY RANGE: SpO2  Av.6 %  Min: 94 %  Max: 96 %  PULSE RANGE: Pulse  Av  Min: 67  Max: 105  BLOOD PRESSURE RANGE: Systolic (89JNH), JEJ:059 , Min:86 , YWN:210   ; Diastolic (24hrs), Av, Min:58, Max:70    I/O (24Hr): Intake/Output Summary (Last 24 hours) at 2/3/2022 1248  Last data filed at 2/3/2022 0830  Gross per 24 hour   Intake 180 ml   Output    Net 180 ml     Objective:  General Appearance:  Comfortable. Vital signs: (most recent): Blood pressure (!) 86/58, pulse (!) 105, temperature 97.3 °F (36.3 °C), resp. rate 14, height 5' 4\" (1.626 m), weight 46 kg (101 lb 6.6 oz), SpO2 94 %. Vital signs are normal.    HEENT: Normal HEENT exam.    Lungs:  Normal effort and normal respiratory rate. Heart: Normal rate. Abdomen: Abdomen is soft. Neurological: Patient is alert. Skin:  Warm and dry. Labs/Imaging/Diagnostics    Labs:  CBC:  Recent Labs     22  0855 22  0728 22  0721   WBC 0.5* 0.6* 0.6*   RBC 2.83* 3.04* 2.87*   HGB 8.8* 9.5* 8.8*   HCT 28.4* 30.4* 28.4*   .4* 100.0* 99.0   RDW 19.0* 19.3* 19.7*    232 218     CHEMISTRIES:  Recent Labs     22  0721 22  1316    140   K 3.3* 3.6    105   CO2 34* 33*   BUN 23* 24*   CA 7.7* 7.8*   PT/INR:  No results for input(s): INR, INREXT, INREXT in the last 72 hours. No lab exists for component: PROTIME  APTT:  No results for input(s): APTT in the last 72 hours. LIVER PROFILE:  Recent Labs     22  0721 22  1316   AST 57* 76*   ALT 73 80*     Lab Results   Component Value Date/Time    ALT (SGPT) 73 2022 07:21 AM    AST (SGOT) 57 (H) 2022 07:21 AM    Alk. phosphatase 97 2022 07:21 AM    Bilirubin, total 2.0 (H) 2022 07:21 AM       Imaging Last 24 Hours:  No results found.   Assessment//Plan   Principal Problem:    Acute respiratory failure with hypoxia ([address] (2022)    Active Problems:    Pulmonary emboli ([address] (2022)      Lung abscess ([address] (2022)      Deep vein thrombosis (DVT) of lower extremity (HCC) (2022)      Atrial fibrillation ([address] (2022)      Constipation (2022)      GIB (gastrointestinal bleeding) (1/29/2022)      Overview: Recent      Chronic gastric ulcer (1/29/2022)      Overview: Recent      COVID-19 (1/29/2022)      Overview: Dec 2021      Anemia (1/29/2022)      Hypokalemia (1/29/2022)      Bilateral pleural effusion (1/29/2022)      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 Reportedly positive for coronavirus the first week of december 2021.     1/2/22 presents to [facility] with altered mental status. During the ED course was found to have an infiltrate on chest imaging, and started on broad spectrum antibiotics. Admitted to hospital and improved during the hospital course with meropenem. Hospital course further complicated by pulmonary embolism. 1/6/22 discharged from hospital on oral anticoagulation.     1/7/22 returns to hospital for acute hypoxic respiratory failure. CT reveals new development of Cavitary lesion of the inferior lateral right middle lobe. Admitted to hospital and I have been asked to see her in consultation.     1/10/22 underwent EGD with Dr Ignacia Rodas:   Large duodenal bulb ulcer - injected and cauterized  Hiatal hernia  Recommendations:   PPI continuous infusion  NPO, IVF     For the cavitary pneumonia I had her on empiric meropenem with an anticipated stop date of 2/4/22.  1/24/22 discharged from [facility]     1/29/22 presents to [facility] because of lower abdominal pains. Reports constipation for the past few days. No improvement with enemas at home.  Admitted to hospital for stabilization and further workup of her condition.     MICROBIOLOGY     1/2/22              Covid 19          Positive  1/2/22              Blood               Negative  1/3/22              Urine                Negative  1/7/22              Blood               Negative    1/29/22            Covid 19 Negative  1/29/22            Blood               Negative     ASSESSMENT AND RECOMMENDATIONS     1) Pneumonia with development in the setting of SARS-CoV-2 coronaviral respiratory syndrome. Further complicated by new development of right sided cavitary lesion, improved with a long course of meropenem.                 Repeat CT chest shows improvement in her pulmonary condition              Meropenem iv through 2/1/22    2) Development of neutropenia during this hospital stay.      No objection to [employer] signed by Bj Garcia MD on 2/3/2022 at 12:17 PM

## 2022-02-03 NOTE — PROGRESS NOTES
Pulmonary/CC Progress Note  Elderly patient with history of extensive tobacco abuse, COPD recently treated for COVID-19 pneumonia in December 2021. Recently she was hospitalized with right middle lobe abscess and had been on IV meropenem. CT scan showed subacute thrombus    Subjective:   Daily Progress Note: 2/3/2022 4:16 PM    Patient without significant distress. Afebrile. Weak and debilitated. Yesterday she was given Fleet enema with bowel movement. Daughter present at bedside. According to daughter patient is still morning for lower abdominal discomfort from constipation.     Current Facility-Administered Medications   Medication Dose Route Frequency    tbo-filgrastim (GRANIX) injection 300 mcg  300 mcg SubCUTAneous DAILY    folic acid (FOLVITE) tablet 1 mg  1 mg Oral DAILY    lactulose (CHRONULAC) 10 gram/15 mL solution 30 mL  30 mL Oral DAILY PRN    guaiFENesin ER (MUCINEX) tablet 600 mg  600 mg Oral Q12H    albuterol-ipratropium (DUO-NEB) 2.5 MG-0.5 MG/3 ML  3 mL Nebulization Q6HWA RT    polyethylene glycol (MIRALAX) packet 17 g  17 g Oral DAILY    amiodarone (CORDARONE) tablet 200 mg  200 mg Oral DAILY    [Held by provider] pantoprazole (PROTONIX) tablet 40 mg  40 mg Oral BID    midodrine (PROAMATINE) tablet 5 mg  5 mg Oral BID WITH MEALS    apixaban (ELIQUIS) tablet 5 mg  5 mg Oral BID    sodium chloride (NS) flush 5-40 mL  5-40 mL IntraVENous Q8H    sodium chloride (NS) flush 5-40 mL  5-40 mL IntraVENous PRN    acetaminophen (TYLENOL) tablet 650 mg  650 mg Oral Q6H PRN    Or    acetaminophen (TYLENOL) suppository 650 mg  650 mg Rectal Q6H PRN    polyethylene glycol (MIRALAX) packet 17 g  17 g Oral DAILY PRN    ondansetron (ZOFRAN ODT) tablet 4 mg  4 mg Oral Q8H PRN    Or    ondansetron (ZOFRAN) injection 4 mg  4 mg IntraVENous Q6H PRN        Review of Systems  Unchanged from initial consult    Objective:     Visit Vitals  BP (!) 86/58 (BP 1 Location: Left upper arm, BP Patient Position: At rest) Comment: informed the nurse   Pulse (!) 105 Comment: informed the nurse   Temp 97.3 °F (36.3 °C)   Resp 14   Ht 5' 4\" (1.626 m)   Wt 46 kg (101 lb 6.6 oz)   SpO2 94%   BMI 17.41 kg/m²    O2 Flow Rate (L/min): 2 l/min O2 Device: Nasal cannula    Temp (24hrs), Av.5 °F (36.4 °C), Min:97.3 °F (36.3 °C), Max:97.8 °F (36.6 °C)      701 -  1900  In: 180 [P.O.:180]  Out: -   No intake/output data recorded. HEENT: Normal HEENT exam.    Lungs:  Normal effort and normal respiratory rate. Heart: Normal rate. Abdomen: Abdomen is soft. Neurological: Patient is alert. Skin:  Warm and dry. Lab/Data Review:  Recent Labs     22  0855 22  0728 22  0721   WBC 0.5* 0.6* 0.6*   HGB 8.8* 9.5* 8.8*   HCT 28.4* 30.4* 28.4*    232 218     Recent Labs     22  0721 22  1316    140   K 3.3* 3.6    105   CO2 34* 33*   GLU 88 113*   BUN 23* 24*   CREA 0.43* 0.56   CA 7.7* 7.8*   ALB 1.7* 1.8*   TBILI 2.0* 1.9*   ALT 73 80*     No results for input(s): PH, PCO2, PO2, HCO3, FIO2 in the last 72 hours. Diagnostics   CXR Results  (Last 48 hours)    None             Assessment/Plan:     Principal Problem:    Acute respiratory failure with hypoxia ([address] (2022)    Active Problems:    Pulmonary emboli ([address] (2022)      Lung abscess ([address] (2022)      Deep vein thrombosis (DVT) of lower extremity (HCC) (2022)      Atrial fibrillation ([address] (2022)      Constipation (2022)      GIB (gastrointestinal bleeding) (2022)      Overview: Recent      Chronic gastric ulcer (2022)      Overview: Recent      COVID-19 (2022)      Overview: Dec 2021      Anemia (2022)      Hypokalemia (2022)      Bilateral pleural effusion (2022)      Gallbladder anomaly (2022)      Hiatal hernia (2022)      Hypotension (2022)      Pleural effusion (2022)      1. Pneumonia.   An 79year-old frail  female with a history of very extensive tobacco abuse. She has underlying chronic obstructive pulmonary disease but does not appear to be in exacerbation. She had a recent hospitalization in [facility] a couple of times. In 12/2021, she had COVID-19 pneumonia. More recently hospitalized with right middle lobe abscess. Patient underwent CT-guided drainage of abscess only 3 cc of foul-smelling fluid was drained after which she improved, WBC count came back to normal and she was discharged from the hospital on IV meropenem. .     Also venous thromboembolism and has been on Eliquis. Meropenem and Eliquis have been resumed. CTA shows subacute thrombus. There is no focal airspace disease. From pulmonary perspective patient can be discharged to home. I spoke to patient's daughter at bedside, patient has arrangement for hospital bed at home, she is also has arrangement for home health care and physical therapy. Continue with oxygen 2 L which she already has at home. Her oral intake has been poor and there is apparently some weight loss. 2.  Small to moderate bilateral pleural effusions. She appears to be third spacing. She also has dependent edema. Albumin is 1.7 only. Her EF is normal.  I believe that her effusions and third spacing is due to hypoalbuminemia and malnutrition. 3.  Atrial fibrillation and venous thromboembolism. Continue with Eliquis. Monitor hemoglobin. 4.  History of gastrointestinal bleed due to gastric ulcer, status post cauterization at [facility]. Continue with Protonix p.o. b.i.d. She also has gallbladder sludge. 5.  Constipation. Further management as per primary attending. Patient getting Fleet enema today  6. Enlarged thyroid and part of that appears to be retrosternal.  She also has mediastinal calcified lymphadenopathy. I believe this is all part and parcel of prior granulomatous disease. No acute problems.   7.  Leukopenia:  WBC count is only 0.5  Hematology following. Patient is off of antibiotics.     Care Plan discussed with: Patient's daughter at [facility]

## 2022-02-03 NOTE — PROGRESS NOTES
CM spoke with patient's daughter, Jose A Mckenzie [phone], to discuss DCP. Ms. Jose A Mckenzie states that she would like to bring patient back home but was unsure at this time. States that between her and her sister and brother they provide 24/7 care to patient. Patient was current with [facility] and they would like to resume services, she's also requesting a wheelchair for the patient at this time, gave no choice for DME agency, attending notified. Referral sent to [facility] and [facility], awaiting acceptance. CM asked that Ms. Jose A Mckenzie reach out to  if any changes to DCP were made.

## 2022-02-03 NOTE — PROGRESS NOTES
Progress Note     CHIEF COMPLAINT:     History: Kaylan Guzman is a 79 y.o. WHITE/NON- female admitted on 1/29/2022 by Phuc Saul MD whose history is given by a review of chart and the patient. We are asked by Hospitalist to see in consultation for atrial fibrillation. Patient has a past medical history of atrial fibrillation, anemia, acute respiratory failure, lung abscess, and chronic gastric ulcer. Recently admitted and discharged to [facility] with similar complaints. Review of Systems     A comprehensive review of systems was negative except for that written in the [address] Day: 6     2/1/22: Patient is awake and alert this morning. She denies chest pain or discomfort. Continues to complain of abdominal pain, no bowel movement as of yet. Her daughter is at the bedside and has questions regarding her medications. 2/2/22: Resting comfortably this am. No acute events noted overnight. 2/3/22: Lying in bed, moaning with her eyes close, she offers no specific complaints. Patient's son is at the bedside. Case discussed with Dr. Js Maciel and our impression and plan are as follows:      1. Atrial fibrillation:   - in atrial flutter today per telemetry, HR 70-90s  - continue amiodarone and Eliquis  - continue telemetry  - 2/2/22 EKG: atrial fibrillation: HR: 99, QTc 395.  - maintain electrolytes and replete as needed.       2. History of PE  - continue Eliquis    Medical Issues:  has no past medical history on file.      Patient Active Problem List    Diagnosis Date Noted    Pulmonary emboli ([address] 01/29/2022    Acute respiratory failure with hypoxia ([address] 01/29/2022    Lung abscess ([address] 01/29/2022    Deep vein thrombosis (DVT) of lower extremity ([address] 01/29/2022    Atrial fibrillation ([address] 01/29/2022    Constipation 01/29/2022    GIB (gastrointestinal bleeding) 01/29/2022    Chronic gastric ulcer 01/29/2022    COVID-19 01/29/2022    Anemia 01/29/2022    Hypokalemia 2022    Bilateral pleural effusion 2022    Gallbladder anomaly 2022    Hiatal hernia 2022    Hypotension 2022    Pleural effusion 2022        VITAL SIGNS:        Visit Vitals  BP (!) 86/58 (BP 1 Location: Left upper arm, BP Patient Position: At rest)   Pulse (!) 105   Temp 97.3 °F (36.3 °C)   Resp 14   Ht 5' 4\" (1.626 m)   Wt 46 kg (101 lb 6.6 oz)   SpO2 94%   BMI 17.41 kg/m²        Temp (24hrs), Av.5 °F (36.4 °C), Min:97.3 °F (36.3 °C), Max:97.8 °F (36.6 °C)         MEDICATIONS       Current Facility-Administered Medications:     tbo-filgrastim (GRANIX) injection 300 mcg, 300 mcg, SubCUTAneous, DAILY, Chantale Dickens MD, 300 mcg at  7954    folic acid (FOLVITE) tablet 1 mg, 1 mg, Oral, DAILY, Chantale Dickens MD, 1 mg at 22 0839    lactulose (CHRONULAC) 10 gram/15 mL solution 30 mL, 30 mL, Oral, DAILY PRN, Vicente Coe MD, 30 mL at 22 1420    guaiFENesin ER (MUCINEX) tablet 600 mg, 600 mg, Oral, Q12H, Nalini Daniels MD, 600 mg at 22 0839    albuterol-ipratropium (DUO-NEB) 2.5 MG-0.5 MG/3 ML, 3 mL, Nebulization, Q6HWA RT, Michael Alaniz DO, 3 mL at 22 0813    polyethylene glycol (MIRALAX) packet 17 g, 17 g, Oral, DAILY, Reggie Loya MD, 17 g at 22 1622    amiodarone (CORDARONE) tablet 200 mg, 200 mg, Oral, DAILY, Vicente Coe MD, 200 mg at 22 0839    [Held by provider] pantoprazole (PROTONIX) tablet 40 mg, 40 mg, Oral, BID, Vicente Coe MD, 40 mg at 22 1017    midodrine (PROAMATINE) tablet 5 mg, 5 mg, Oral, BID WITH MEALS, Vicente Coe MD, 5 mg at 22 2212    apixaban (ELIQUIS) tablet 5 mg, 5 mg, Oral, BID, Vicente Coe MD, 5 mg at 22 0839    sodium chloride (NS) flush 5-40 mL, 5-40 mL, IntraVENous, Q8H, OzGail MD, 10 mL at 22 3892    sodium chloride (NS) flush 5-40 mL, 5-40 mL, IntraVENous, PRN, Vicente Coe MD    acetaminophen (TYLENOL) tablet 650 mg, 650 mg, Oral, Q6H PRN, 650 mg at 02/03/22 0845 **OR** acetaminophen (TYLENOL) suppository 650 mg, 650 mg, Rectal, Q6H PRN, Purvi Keller MD    polyethylene glycol (MIRALAX) packet 17 g, 17 g, Oral, DAILY PRN, Purvi Keller MD    ondansetron (ZOFRAN ODT) tablet 4 mg, 4 mg, Oral, Q8H PRN **OR** ondansetron (ZOFRAN) injection 4 mg, 4 mg, IntraVENous, Q6H PRN, Kamaljit Robles MD      INTAKE / OUPUT       Intake/Output Summary (Last 24 hours) at 2/3/2022 1146  Last data filed at 2/3/2022 0830  Gross per 24 hour   Intake 180 ml   Output    Net 180 ml              EXAM:       .Physical Exam  Constitutional:       General: She is not in acute distress. Appearance: She is ill-appearing. Cardiovascular:      Rate and Rhythm: Normal rate and regular rhythm. Pulses: Normal pulses. Heart sounds: Normal heart sounds. Pulmonary:      Effort: Pulmonary effort is normal. No respiratory distress. Breath sounds: Normal breath sounds. No stridor. No wheezing, rhonchi or rales. Abdominal:      General: Abdomen is flat. Bowel sounds are normal. There is no distension. Palpations: Abdomen is soft. Tenderness: There is no abdominal tenderness. There is no rebound. Skin:     General: Skin is warm and dry. Coloration: Skin is pale. Neurological:      General: No focal deficit present. Mental Status: She is alert and oriented to person, place, and time.    Psychiatric:         Mood and Affect: Mood normal.         Behavior: Behavior normal.          DATA:      Recent Labs     02/03/22  0855 02/02/22  0728 02/01/22  0721   WBC 0.5* 0.6* 0.6*   HGB 8.8* 9.5* 8.8*    232 218     Recent Labs     02/01/22  0721 01/31/22  1316    140   K 3.3* 3.6    105   CO2 34* 33*   GLU 88 113*   BUN 23* 24*   CREA 0.43* 0.56   CA 7.7* 7.8*   ALB 1.7* 1.8*   ALT 73 80*            IMAGING:       Results from Hospital Encounter encounter on 01/29/22    XR CHEST PORT    Narrative  Chest one view. AP semiupright portable at 0925 dated 1/30/2022. Comparison 1/29/2022. A right arm PICC line remains in place. The heart is enlarged with calcified  plaque and tortuosity in the aorta. Right pleural fluid/pleural thickening is redemonstrated. There has been interval development of mild interstitial edema in perihilar and  lower lobe distributions. There is no lobar consolidation or pneumothorax. Impression  Comparison 1/29/2022. Interval development of mild pulmonary edema. Results from [place] encounter on 01/29/22    CT ABD PELV W CONT    Narrative  Dose Reduction:  All CT scans at this facility are performed using dose reduction optimization  techniques as appropriate to a performed exam including the following: Automated  exposure control, adjustments of the mA and/or kV according to patient size, or  use of iterative reconstruction technique. IV contrast: 100 cc Isovue 370    TECHNIQUE: Contrast-enhanced images of chest, abdomen, and pelvis. MIP reformats  of thoracic CT angiography. CTA chest: Prominent enlargement of thyroid gland. Asymmetric extension of left  thyroid lobe with multiple calcified nodules to involve the superior and middle  mediastinum, with slight mass effect on the airway at the thoracic inlet and the  mid and upper thoracic esophagus. Calcified tortuous thoracic aorta, without  focal aneurysmal dilatation. Cardiomegaly with dilated left atrium and enlarged  right atrium and right ventricle. Reflux of contrast into dilated IVC and  hepatic veins, consistent with right-sided cardiac dysfunction. Right lower lobe  pulmonary arterial filling defects, consistent with pulmonary emboli, acute  versus subacute. Main pulmonary artery 2.5 cm. Small bilateral pleural  effusions. Hiatus hernia with partially intrathoracic stomach.  Thin-walled  cavitary lesion, right middle lobe, possibly abscess or pneumatocele, less  likely neoplasm. Upper lobe/apical predominant emphysema. Diffuse bronchial wall  thickening with endobronchial opacities in the lower lobes bilaterally. No focal  lytic or blastic bone lesion. Degenerative changes of mid thoracic spine. CT abdomen/pelvis: Subcentimeter bilateral hepatic lobe lesions are  statistically likely benign but too small to characterize definitively. Layering  sludge is suspected in the gallbladder. No evidence of radiodense gallbladder  stones. Normal caliber bile duct. Borderline prominence of pancreatic duct. No  focal abnormality is seen involving the pancreas, spleen, or adrenal glands. Subcentimeter renal lesions, not well characterized. No intrinsic abnormality of  the urinary bladder. Hysterectomy. Moderate rectal distention by stool. Mild  edema of perirectal fat. No evidence of intestinal wall thickening or mechanical  obstruction. Appendix not discretely visualized. No definite pneumoperitoneum. Atherosclerotic calcification and tortuosity of the aorta, without aneurysmal  dilatation. Prominent atherosclerotic calcification of iliac and femoral  arteries. No pathologic pelvic or retroperitoneal adenopathy. Degenerative  changes of the lumbar spine with convex leftward lumbar scoliosis. Impression  Positive for pulmonary embolus, right lower lobe, acute versus subacute; the  report communicated to [facility], 1451 hours. Cardiomegaly with bilateral  pleural effusions. Atherosclerosis. Hiatus hernia with partially intrathoracic  stomach. Distended gallbladder with sludge. Moderate rectal distention by stool. Degenerative changes of the spine. Convex leftward scoliosis, centered at the  L1-L2 level.       Results for orders placed or performed during the hospital encounter of 01/29/22   EKG, 12 LEAD, INITIAL   Result Value Ref Range    Ventricular Rate 98 BPM    Atrial Rate 300 BPM    QRS Duration 82 ms    Q-T Interval 342 ms    QTC Calculation (Bezet) 436 ms    Calculated R Axis -10 degrees    Calculated T Axis -120 degrees    Diagnosis       Atrial fibrillation  Low voltage QRS  Septal infarct (cited on or before 22-JUN-2020)  Abnormal ECG  When compared with ECG of 22-JUN-2020 13:28,  Significant changes have occurred  Confirmed by Blade Sherman ([id number]) on 1/31/2022 5:22:09 PM          History:     PMH:  has no past medical history on file. PSH:   has a past surgical history that includes pr egd deliver thermal energy sphnctr/cardia gerd. FHX: Family history is unknown by patient. SHX:      ALL:   Allergies   Allergen Reactions    Penicillins Other (comments)        MEDS:   [x] Reviewed - As Below   [] Not reviewed    Current Facility-Administered Medications   Medication    tbo-filgrastim (GRANIX) injection 856 mcg    folic acid (FOLVITE) tablet 1 mg    lactulose (CHRONULAC) 10 gram/15 mL solution 30 mL    guaiFENesin ER (MUCINEX) tablet 600 mg    albuterol-ipratropium (DUO-NEB) 2.5 MG-0.5 MG/3 ML    polyethylene glycol (MIRALAX) packet 17 g    amiodarone (CORDARONE) tablet 200 mg    [Held by provider] pantoprazole (PROTONIX) tablet 40 mg    midodrine (PROAMATINE) tablet 5 mg    apixaban (ELIQUIS) tablet 5 mg    sodium chloride (NS) flush 5-40 mL    sodium chloride (NS) flush 5-40 mL    acetaminophen (TYLENOL) tablet 650 mg    Or    acetaminophen (TYLENOL) suppository 650 mg    polyethylene glycol (MIRALAX) packet 17 g    ondansetron (ZOFRAN ODT) tablet 4 mg    Or    ondansetron (ZOFRAN) injection 4 mg         Román Johnson NP       Please do not hesitate to call with any further questions or concerns. [facility]  [address]    [address] [facility] [address], [facility]  [phone]

## 2022-02-03 NOTE — PROGRESS NOTES
Hospitalist Progress Note               Daily Progress Note: 2/3/2022      Subjective:   Hospital course to date: Patient is an 25-year-old female with a very complex medical history in recent months. She was admitted for COVID-19 in December 2021 and then hospitalized at [facility] on 1/2/2022 with altered mental status. She was treated with meropenem. That hospital stay was complicated by pulmonary embolism. She was discharged on 1/6 on oral anticoagulation. On 1/7 admitted to [facility] for hypoxic respiratory failure. CT showed new development of cavitary lesion lateral right middle lobe. She was seen by ID and started back on IV Merrem with an anticipated stop date of 2/4. During that hospital stay she underwent EGD which showed a large duodenal bulb ulcer. She was discharged from [facility] on 1/24    Patient then presents to our facility on 1/29 with abdominal pain and constipation    Patient has been seen by multiple consultants this hospital stay including pulmonology, cardiology, ID and hematology, the latter for leukopenia and neutropenia. There was concern that this was related to [facility] which has now been discontinued    ------    Patient seen today for follow-up. Patient had good results with fleets enema yesterday. However, still complain of some lower abdominal pain. Does not want any pain medication, however. Labs today show a white count unchanged at 0.5. Differential is pending.   Hemoglobin is 8.8    Problem List:  Problem List as of 2/3/2022 Date Reviewed: 1/29/2022          Codes Class Noted - Resolved    Pulmonary emboli ([facility] ICD-10-CM: I26.99  ICD-9-CM: 415.19  1/29/2022 - Present        * (Principal) Acute respiratory failure with hypoxia ([facility] ICD-10-CM: J96.01  ICD-9-CM: 518.81  1/29/2022 - Present        Lung abscess ([facility] ICD-10-CM: J85.2  ICD-9-CM: 513.0  1/29/2022 - Present        Deep vein thrombosis (DVT) of lower extremity (HCC) ICD-10-CM: I82.409  ICD-9-CM: 453.40  1/29/2022 - Present        Atrial fibrillation ([facility] ICD-10-CM: I48.91  ICD-9-CM: 427.31  1/29/2022 - Present        Constipation ICD-10-CM: K59.00  ICD-9-CM: 564.00  1/29/2022 - Present        GIB (gastrointestinal bleeding) (Chronic) ICD-10-CM: K92.2  ICD-9-CM: 578.9  1/29/2022 - Present    Overview Signed 1/29/2022  5:04 PM by Fredy Howell MD     Recent             Chronic gastric ulcer (Chronic) ICD-10-CM: K25.7  ICD-9-CM: 531.70  1/29/2022 - Present    Overview Signed 1/29/2022  5:05 PM by Fredy Howell MD     Recent             COVID-19 (Chronic) ICD-10-CM: U07.1  ICD-9-CM: 079.89  1/29/2022 - Present    Overview Signed 1/29/2022  5:05 PM by Fredy Howell MD     Dec 2021             Anemia ICD-10-CM: D64.9  ICD-9-CM: 285.9  1/29/2022 - Present        Hypokalemia ICD-10-CM: E87.6  ICD-9-CM: 276.8  1/29/2022 - Present        Bilateral pleural effusion ICD-10-CM: J90  ICD-9-CM: 511.9  1/29/2022 - Present        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  ICD-9-CM: 751.60  1/29/2022 - Present        Hiatal hernia ICD-10-CM: K44.9  ICD-9-CM: 553.3  1/29/2022 - Present        Hypotension ICD-10-CM: I95.9  ICD-9-CM: 458.9  1/29/2022 - Present        Pleural effusion ICD-10-CM: J90  ICD-9-CM: 511.9  1/29/2022 - Present              Medications reviewed  Current Facility-Administered Medications   Medication Dose Route Frequency    tbo-filgrastim (GRANIX) injection 300 mcg  300 mcg SubCUTAneous DAILY    folic acid (FOLVITE) tablet 1 mg  1 mg Oral DAILY    lactulose (CHRONULAC) 10 gram/15 mL solution 30 mL  30 mL Oral DAILY PRN    guaiFENesin ER (MUCINEX) tablet 600 mg  600 mg Oral Q12H    albuterol-ipratropium (DUO-NEB) 2.5 MG-0.5 MG/3 ML  3 mL Nebulization Q6HWA RT    polyethylene glycol (MIRALAX) packet 17 g  17 g Oral DAILY    amiodarone (CORDARONE) tablet 200 mg  200 mg Oral DAILY    [Held by provider] pantoprazole (PROTONIX) tablet 40 mg  40 mg Oral BID    midodrine (PROAMATINE) tablet 5 mg  5 mg Oral BID WITH MEALS    apixaban (ELIQUIS) tablet 5 mg  5 mg Oral BID    sodium chloride (NS) flush 5-40 mL  5-40 mL IntraVENous Q8H    sodium chloride (NS) flush 5-40 mL  5-40 mL IntraVENous PRN    acetaminophen (TYLENOL) tablet 650 mg  650 mg Oral Q6H PRN    Or    acetaminophen (TYLENOL) suppository 650 mg  650 mg Rectal Q6H PRN    polyethylene glycol (MIRALAX) packet 17 g  17 g Oral DAILY PRN    ondansetron (ZOFRAN ODT) tablet 4 mg  4 mg Oral Q8H PRN    Or    ondansetron (ZOFRAN) injection 4 mg  4 mg IntraVENous Q6H PRN       Review of Systems:   A comprehensive review of systems was negative except for that written in the HPI. Objective:   Physical Exam:     Visit Vitals  BP (!) 86/58 (BP 1 Location: Left upper arm, BP Patient Position: At rest) Comment: informed the nurse   Pulse (!) 105 Comment: informed the nurse   Temp 97.3 °F (36.3 °C)   Resp 14   Ht 5' 4\" (1.626 m)   Wt 46 kg (101 lb 6.6 oz)   SpO2 94%   BMI 17.41 kg/m²    O2 Flow Rate (L/min): 2 l/min O2 Device: Nasal cannula    Temp (24hrs), Av.5 °F (36.4 °C), Min:97.3 °F (36.3 °C), Max:97.8 °F (36.6 °C)     07 -  1900  In: 180 [P.O.:180]  Out: -    No intake/output data recorded. General:   Awake and alert   Lungs:   Clear to auscultation bilaterally. Chest wall:  No tenderness or deformity. Heart:  Regular rate and rhythm, S1, S2 normal, no murmur, click, rub or gallop. Abdomen:   Soft, non-tender. Bowel sounds normal. No masses,  No organomegaly. Extremities: Extremities normal, atraumatic, no cyanosis or edema. Pulses: 2+ and symmetric all extremities. Skin: Skin color, texture, turgor normal. No rashes or lesions   Neurologic: CNII-XII intact.   No gross focal deficits         Data Review:       Recent Days:  Recent Labs     22  0855 22  0728 22  0721   WBC 0.5* 0.6* 0.6*   HGB 8.8* 9.5* 8.8*   HCT 28.4* 30.4* 28.4*    232 218     Recent Labs     22  0721 22  1316    140   K 3.3* 3.6    105   CO2 34* 33*   GLU 88 113*   BUN 23* 24*   CREA 0.43* 0.56   CA 7.7* 7.8*   ALB 1.7* 1.8*   TBILI 2.0* 1.9*   ALT 73 80*     No results for input(s): PH, PCO2, PO2, HCO3, FIO2 in the last 72 hours. 24 Hour Results:  Recent Results (from the past 24 hour(s))   EKG, 12 LEAD, SUBSEQUENT    Collection Time: 02/02/22 12:03 PM   Result Value Ref Range    Ventricular Rate 99 BPM    Atrial Rate 375 BPM    QRS Duration 72 ms    Q-T Interval 308 ms    QTC Calculation (Bezet) 395 ms    Calculated R Axis -5 degrees    Calculated T Axis -165 degrees    Diagnosis       Atrial fibrillation  Low voltage QRS  Anterolateral infarct (cited on or before 29-JAN-2022)  Abnormal ECG  When compared with ECG of 01-FEB-2022 11:36, (Unconfirmed)  Criteria for Inferior infarct are no longer Present  Questionable change in initial forces of Lateral leads  Confirmed by Maricruz Bueno ([id number]) on 2/2/2022 1:43:03 PM     CBC WITH AUTOMATED DIFF    Collection Time: 02/03/22  8:55 AM   Result Value Ref Range    WBC 0.5 (LL) 3.6 - 11.0 K/uL    RBC 2.83 (L) 3.80 - 5.20 M/uL    HGB 8.8 (L) 11.5 - 16.0 g/dL    HCT 28.4 (L) 35.0 - 47.0 %    .4 (H) 80.0 - 99.0 FL    MCH 31.1 26.0 - 34.0 PG    MCHC 31.0 30.0 - 36.5 g/dL    RDW 19.0 (H) 11.5 - 14.5 %    PLATELET 911 414 - 562 K/uL    MPV 11.3 8.9 - 12.9 FL    NRBC 0.0 0.0  WBC    ABSOLUTE NRBC 0.00 0.00 - 0.01 K/uL    NEUTROPHILS PENDING %    LYMPHOCYTES PENDING %    MONOCYTES PENDING %    EOSINOPHILS PENDING %    BASOPHILS PENDING %    IMMATURE GRANULOCYTES PENDING %    ABS. NEUTROPHILS PENDING K/UL    ABS. LYMPHOCYTES PENDING K/UL    ABS. MONOCYTES PENDING K/UL    ABS. EOSINOPHILS PENDING K/UL    ABS. BASOPHILS PENDING K/UL    ABS. IMM. GRANS. PENDING K/UL    DF PENDING        XR CHEST PORT   Final Result   Comparison 1/29/2022. Interval development of mild pulmonary edema.       CTA CHEST W OR W WO CONT   Final Result   Positive for pulmonary embolus, right lower lobe, acute versus subacute; the   report communicated to [facility], 1451 hours. Cardiomegaly with bilateral   pleural effusions. Atherosclerosis. Hiatus hernia with partially intrathoracic   stomach. Distended gallbladder with sludge. Moderate rectal distention by stool. Degenerative changes of the spine. Convex leftward scoliosis, centered at the   L1-L2 level. CT ABD PELV W CONT   Final Result   Positive for pulmonary embolus, right lower lobe, acute versus subacute; the   report communicated to K[facility] Armenta, 1451 hours. Cardiomegaly with bilateral   pleural effusions. Atherosclerosis. Hiatus hernia with partially intrathoracic   stomach. Distended gallbladder with sludge. Moderate rectal distention by stool. Degenerative changes of the spine. Convex leftward scoliosis, centered at the   L1-L2 level. XR CHEST PORT   Final Result   Comparison 6/22/2020. Central line position as above. Right hemithorax pleural fluid/pleural thickening. Additional chronic findings   as above. 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 On Eliquis    Peptic ulcer disease with recent large duodenal bulb ulcer      Plan:  Continue supportive care  Hematology following, was given Granix yesterday  Continue to follow [address] discussed with: Patient/Family    Disposition: Possible SNF, awaiting some improvement in WBC      Total time spent with patient: 30 minutes.     Melissa Roque MD

## 2022-02-03 NOTE — PROGRESS NOTES
Problem: Pressure Injury - Risk of  Goal: *Prevention of pressure injury  Description: Document Zeyad Scale and appropriate interventions in the flowsheet. Outcome: Progressing Towards Goal  Note: Pressure Injury Interventions:  Sensory Interventions: Assess changes in LOC,Keep linens dry and wrinkle-free    Moisture Interventions: Absorbent underpads    Activity Interventions: Increase time out of bed,PT/OT evaluation    Mobility Interventions: HOB 30 degrees or less,PT/OT evaluation    Nutrition Interventions: Offer support with meals,snacks and hydration    Friction and Shear Interventions: Apply protective barrier, creams and emollients,Minimize layers                Problem: Patient Education: Go to Patient Education Activity  Goal: Patient/Family Education  Outcome: Progressing Towards Goal     Problem: Falls - Risk of  Goal: *Absence of Falls  Description: Document Esther Fall Risk and appropriate interventions in the flowsheet.   Outcome: Progressing Towards Goal  Note: Fall Risk Interventions:  Mobility Interventions: OT consult for ADLs,PT Consult for mobility concerns    Mentation Interventions: Adequate sleep, hydration, pain control,Bed/chair exit alarm    Medication Interventions: Teach patient to arise slowly    Elimination Interventions: Call light in reach

## 2022-02-03 NOTE — PROGRESS NOTES
Subjective   Subjective:      HPI : No fever or chills. Constipation little better. Pt weak.      Objective     Current Facility-Administered Medications:     tbo-filgrastim (GRANIX) injection 300 mcg, 300 mcg, SubCUTAneous, DAILY, Aruna Allan MD, 300 mcg at 84/31/37 0339    folic acid (FOLVITE) tablet 1 mg, 1 mg, Oral, DAILY, Aruna Allan MD, 1 mg at 02/03/22 0839    lactulose (CHRONULAC) 10 gram/15 mL solution 30 mL, 30 mL, Oral, DAILY PRN, Daniela Ennis MD, 30 mL at 01/31/22 1420    guaiFENesin ER (MUCINEX) tablet 600 mg, 600 mg, Oral, Q12H, Nalini Daniels MD, 600 mg at 02/03/22 0839    albuterol-ipratropium (DUO-NEB) 2.5 MG-0.5 MG/3 ML, 3 mL, Nebulization, Q6HWA RT, Michael Alaniz DO, 3 mL at 02/03/22 0813    polyethylene glycol (MIRALAX) packet 17 g, 17 g, Oral, DAILY, Reggie Loya MD, 17 g at 02/02/22 1622    amiodarone (CORDARONE) tablet 200 mg, 200 mg, Oral, DAILY, Daniela Ennis MD, 200 mg at 02/03/22 0839    [Held by provider] pantoprazole (PROTONIX) tablet 40 mg, 40 mg, Oral, BID, Daniela Ennis MD, 40 mg at 02/01/22 1017    midodrine (PROAMATINE) tablet 5 mg, 5 mg, Oral, BID WITH MEALS, Daniela Ennis MD, 5 mg at 02/03/22 8977    apixaban (ELIQUIS) tablet 5 mg, 5 mg, Oral, BID, Daniela Ennis MD, 5 mg at 02/03/22 0839    sodium chloride (NS) flush 5-40 mL, 5-40 mL, IntraVENous, Q8H, Ankita Clinton MD, 10 mL at 02/03/22 8925    sodium chloride (NS) flush 5-40 mL, 5-40 mL, IntraVENous, PRN, Daniela Ennis MD    acetaminophen (TYLENOL) tablet 650 mg, 650 mg, Oral, Q6H PRN, 650 mg at 02/03/22 0845 **OR** acetaminophen (TYLENOL) suppository 650 mg, 650 mg, Rectal, Q6H PRN, Daniela Ennis MD    polyethylene glycol (MIRALAX) packet 17 g, 17 g, Oral, DAILY PRN, Daniela Ennis MD    ondansetron (ZOFRAN ODT) tablet 4 mg, 4 mg, Oral, Q8H PRN **OR** ondansetron (ZOFRAN) injection 4 mg, 4 mg, IntraVENous, Q6H PRN, Daniela Ennis MD    Objective:     Visit Vitals  BP (!) 86/58 (BP 1 Location: Left upper arm, BP Patient Position: At rest) Comment: informed the nurse   Pulse (!) 105 Comment: informed the nurse   Temp 97.3 °F (36.3 °C)   Resp 14   Ht 5' 4\" (1.626 m)   Wt 46 kg (101 lb 6.6 oz)   SpO2 94%   BMI 17.41 kg/m²      Physical Exam   Lungs:CTA  HEART:RRR  ABD;NT,BS+  EXT:no edema    @[email or web address]    [email or web address]     Data Review:   Recent Results (from the past 24 hour(s))   CBC WITH AUTOMATED DIFF    Collection Time: 02/03/22  8:55 AM   Result Value Ref Range    WBC 0.5 (LL) 3.6 - 11.0 K/uL    RBC 2.83 (L) 3.80 - 5.20 M/uL    HGB 8.8 (L) 11.5 - 16.0 g/dL    HCT 28.4 (L) 35.0 - 47.0 %    .4 (H) 80.0 - 99.0 FL    MCH 31.1 26.0 - 34.0 PG    MCHC 31.0 30.0 - 36.5 g/dL    RDW 19.0 (H) 11.5 - 14.5 %    PLATELET 790 773 - 685 K/uL    MPV 11.3 8.9 - 12.9 FL    NRBC 0.0 0.0  WBC    ABSOLUTE NRBC 0.00 0.00 - 0.01 K/uL    NEUTROPHILS PENDING %    LYMPHOCYTES PENDING %    MONOCYTES PENDING %    EOSINOPHILS PENDING %    BASOPHILS PENDING %    IMMATURE GRANULOCYTES PENDING %    ABS. NEUTROPHILS PENDING K/UL    ABS. LYMPHOCYTES PENDING K/UL    ABS. MONOCYTES PENDING K/UL    ABS. EOSINOPHILS PENDING K/UL    ABS. BASOPHILS PENDING K/UL    ABS. IMM. GRANS. PENDING K/UL    DF PENDING        Leukopenia neutropenia:Most likely due to meropenem. R/o due to amiadarone  Less likely due to bone marrow pathology. Meropenem d/cd by ID. White count still very low. ANC 0.0. If not better may need bone marrow biopsy. Other option supportive care due to pt general condition. Continue granix. Neutropenic precautions. Pt afebrile. Risk of infections discussed with pt daughter. Normocytic hypochromic anemia:check iron studies,SPEP. Hb improving. Pt folate low. Start folic acid 1mg every day. Constipation:Mangement per primary team.Pt getting enema today. D/w pt nurse.         Pulmonary emboli  and DVT:continue eliquis. Monitor for bleeding closely. Lung abscess:S/p Meropenem. ID following. Better. D/w ID.     Distended gall bladder     H/o GI bleeding:no active bleeding clinically. D/w pt and pt daughter.   D/w primary team.                   Plan:

## 2022-02-04 ENCOUNTER — APPOINTMENT (OUTPATIENT)
Dept: GENERAL RADIOLOGY | Age: 82
DRG: 175 | End: 2022-02-04
Attending: INTERNAL MEDICINE
Payer: MEDICARE

## 2022-02-04 LAB
ALBUMIN SERPL ELPH-MCNC: 2.4 G/DL (ref 2.9–4.4)
ALBUMIN/GLOB SERPL: 1.2 {RATIO} (ref 0.7–1.7)
ALPHA1 GLOB SERPL ELPH-MCNC: 0.2 G/DL (ref 0–0.4)
ALPHA2 GLOB SERPL ELPH-MCNC: 0.6 G/DL (ref 0.4–1)
ANION GAP SERPL CALC-SCNC: 4 MMOL/L (ref 5–15)
B-GLOBULIN SERPL ELPH-MCNC: 0.6 G/DL (ref 0.7–1.3)
BUN SERPL-MCNC: 27 MG/DL (ref 6–20)
BUN/CREAT SERPL: 38 (ref 12–20)
CA-I BLD-MCNC: 7.8 MG/DL (ref 8.5–10.1)
CHLORIDE SERPL-SCNC: 105 MMOL/L (ref 97–108)
CO2 SERPL-SCNC: 32 MMOL/L (ref 21–32)
CREAT SERPL-MCNC: 0.71 MG/DL (ref 0.55–1.02)
ERYTHROCYTE [DISTWIDTH] IN BLOOD BY AUTOMATED COUNT: 18.6 % (ref 11.5–14.5)
GAMMA GLOB SERPL ELPH-MCNC: 0.6 G/DL (ref 0.4–1.8)
GLOBULIN SER CALC-MCNC: 2 G/DL (ref 2.2–3.9)
GLUCOSE SERPL-MCNC: 135 MG/DL (ref 65–100)
HCT VFR BLD AUTO: 29.2 % (ref 35–47)
HGB BLD-MCNC: 9 G/DL (ref 11.5–16)
M PROTEIN SERPL ELPH-MCNC: 0.1 G/DL
MAGNESIUM SERPL-MCNC: 2 MG/DL (ref 1.6–2.4)
MCH RBC QN AUTO: 31 PG (ref 26–34)
MCHC RBC AUTO-ENTMCNC: 30.8 G/DL (ref 30–36.5)
MCV RBC AUTO: 100.7 FL (ref 80–99)
NRBC # BLD: 0 K/UL (ref 0–0.01)
NRBC BLD-RTO: 0 PER 100 WBC
PLATELET # BLD AUTO: 231 K/UL (ref 150–400)
PMV BLD AUTO: 10.9 FL (ref 8.9–12.9)
POTASSIUM SERPL-SCNC: 3.8 MMOL/L (ref 3.5–5.1)
PROT SERPL-MCNC: 4.4 G/DL (ref 6–8.5)
RBC # BLD AUTO: 2.9 M/UL (ref 3.8–5.2)
SODIUM SERPL-SCNC: 141 MMOL/L (ref 136–145)
WBC # BLD AUTO: 0.7 K/UL (ref 3.6–11)

## 2022-02-04 PROCEDURE — 36415 COLL VENOUS BLD VENIPUNCTURE: CPT

## 2022-02-04 PROCEDURE — 74011250637 HC RX REV CODE- 250/637: Performed by: INTERNAL MEDICINE

## 2022-02-04 PROCEDURE — 77010033678 HC OXYGEN DAILY

## 2022-02-04 PROCEDURE — 94760 N-INVAS EAR/PLS OXIMETRY 1: CPT

## 2022-02-04 PROCEDURE — 83735 ASSAY OF MAGNESIUM: CPT

## 2022-02-04 PROCEDURE — 74011000250 HC RX REV CODE- 250: Performed by: INTERNAL MEDICINE

## 2022-02-04 PROCEDURE — 85027 COMPLETE CBC AUTOMATED: CPT

## 2022-02-04 PROCEDURE — 65270000029 HC RM PRIVATE

## 2022-02-04 PROCEDURE — 74019 RADEX ABDOMEN 2 VIEWS: CPT

## 2022-02-04 PROCEDURE — 74011250636 HC RX REV CODE- 250/636: Performed by: INTERNAL MEDICINE

## 2022-02-04 PROCEDURE — 80048 BASIC METABOLIC PNL TOTAL CA: CPT

## 2022-02-04 PROCEDURE — 94761 N-INVAS EAR/PLS OXIMETRY MLT: CPT

## 2022-02-04 PROCEDURE — 92526 ORAL FUNCTION THERAPY: CPT

## 2022-02-04 PROCEDURE — 94640 AIRWAY INHALATION TREATMENT: CPT

## 2022-02-04 RX ORDER — MAGNESIUM CITRATE
148 SOLUTION, ORAL ORAL
Status: DISCONTINUED | OUTPATIENT
Start: 2022-02-04 | End: 2022-02-04

## 2022-02-04 RX ADMIN — APIXABAN 5 MG: 5 TABLET, FILM COATED ORAL at 09:06

## 2022-02-04 RX ADMIN — FOLIC ACID 1 MG: 1 TABLET ORAL at 09:06

## 2022-02-04 RX ADMIN — GUAIFENESIN 600 MG: 600 TABLET, EXTENDED RELEASE ORAL at 21:32

## 2022-02-04 RX ADMIN — MIDODRINE HYDROCHLORIDE 5 MG: 5 TABLET ORAL at 09:06

## 2022-02-04 RX ADMIN — GUAIFENESIN 600 MG: 600 TABLET, EXTENDED RELEASE ORAL at 09:06

## 2022-02-04 RX ADMIN — APIXABAN 5 MG: 5 TABLET, FILM COATED ORAL at 21:32

## 2022-02-04 RX ADMIN — IPRATROPIUM BROMIDE AND ALBUTEROL SULFATE 3 ML: .5; 2.5 SOLUTION RESPIRATORY (INHALATION) at 09:13

## 2022-02-04 RX ADMIN — AMIODARONE HYDROCHLORIDE 200 MG: 200 TABLET ORAL at 09:06

## 2022-02-04 RX ADMIN — IPRATROPIUM BROMIDE AND ALBUTEROL SULFATE 3 ML: .5; 2.5 SOLUTION RESPIRATORY (INHALATION) at 14:56

## 2022-02-04 RX ADMIN — TBO-FILGRASTIM 300 MCG: 300 INJECTION, SOLUTION SUBCUTANEOUS at 09:08

## 2022-02-04 RX ADMIN — MIDODRINE HYDROCHLORIDE 5 MG: 5 TABLET ORAL at 17:54

## 2022-02-04 RX ADMIN — IPRATROPIUM BROMIDE AND ALBUTEROL SULFATE 3 ML: .5; 2.5 SOLUTION RESPIRATORY (INHALATION) at 20:19

## 2022-02-04 RX ADMIN — SODIUM CHLORIDE, PRESERVATIVE FREE 10 ML: 5 INJECTION INTRAVENOUS at 21:40

## 2022-02-04 RX ADMIN — SODIUM CHLORIDE, PRESERVATIVE FREE 10 ML: 5 INJECTION INTRAVENOUS at 15:26

## 2022-02-04 RX ADMIN — SODIUM CHLORIDE, PRESERVATIVE FREE 10 ML: 5 INJECTION INTRAVENOUS at 06:36

## 2022-02-04 NOTE — PROGRESS NOTES
Hospitalist Progress Note               Daily Progress Note: 2/4/2022      Subjective:   Hospital course to date: Patient is an 74-year-old female with a very complex medical history in recent months. She was admitted for COVID-19 in December 2021 and then hospitalized at [facility] on 1/2/2022 with altered mental status. She was treated with meropenem. That hospital stay was complicated by pulmonary embolism. She was discharged on 1/6 on oral anticoagulation. On 1/7 admitted to [facility] for hypoxic respiratory failure. CT showed new development of cavitary lesion lateral right middle lobe. She was seen by ID and started back on IV Merrem with an anticipated stop date of 2/4. During that hospital stay she underwent EGD which showed a large duodenal bulb ulcer. She was discharged from [facility] on 1/24    Patient then presents to our facility on 1/29 with abdominal pain and constipation    Patient has been seen by multiple consultants this hospital stay including pulmonology, cardiology, ID and hematology, the latter for leukopenia and neutropenia.   There was concern that this was related to [address] [place] [address] which has now been discontinued    ------    Patient seen and evaluated at bedside, of note currently has no new complaints, updated patient's daughter at bedside, discussed with RN    Problem List:  Problem List as of 2/4/2022 Date Reviewed: 1/29/2022          Codes Class Noted - Resolved    Pulmonary emboli ([facility] ICD-10-CM: I26.99  ICD-9-CM: 415.19  1/29/2022 - Present        * (Principal) Acute respiratory failure with hypoxia ([facility] ICD-10-CM: J96.01  ICD-9-CM: 518.81  1/29/2022 - Present        Lung abscess ([facility] ICD-10-CM: J85.2  ICD-9-CM: 513.0  1/29/2022 - Present        Deep vein thrombosis (DVT) of lower extremity ([facility] ICD-10-CM: I82.409  ICD-9-CM: 453.40  1/29/2022 - Present        Atrial fibrillation ([facility] ICD-10-CM: I48.91  ICD-9-CM: 427.31  1/29/2022 - Present        Constipation ICD-10-CM: K59.00  ICD-9-CM: 564.00  1/29/2022 - Present        GIB (gastrointestinal bleeding) (Chronic) ICD-10-CM: K92.2  ICD-9-CM: 578.9  1/29/2022 - Present    Overview Signed 1/29/2022  5:04 PM by Leonora Tran MD     Recent             Chronic gastric ulcer (Chronic) ICD-10-CM: K25.7  ICD-9-CM: 531.70  1/29/2022 - Present    Overview Signed 1/29/2022  5:05 PM by Leonora Tran MD     Recent             COVID-19 (Chronic) ICD-10-CM: U07.1  ICD-9-CM: 079.89  1/29/2022 - Present    Overview Signed 1/29/2022  5:05 PM by Leonora Tran MD     Dec 2021             Anemia ICD-10-CM: D64.9  ICD-9-CM: 285.9  1/29/2022 - Present        Hypokalemia ICD-10-CM: E87.6  ICD-9-CM: 276.8  1/29/2022 - Present        Bilateral pleural effusion ICD-10-CM: J90  ICD-9-CM: 511.9  1/29/2022 - Present        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  ICD-9-CM: 751.60  1/29/2022 - Present        Hiatal hernia ICD-10-CM: K44.9  ICD-9-CM: 553.3  1/29/2022 - Present        Hypotension ICD-10-CM: I95.9  ICD-9-CM: 458.9  1/29/2022 - Present        Pleural effusion ICD-10-CM: J90  ICD-9-CM: 511.9  1/29/2022 - Present              Medications reviewed  Current Facility-Administered Medications   Medication Dose Route Frequency    tbo-filgrastim (GRANIX) injection 300 mcg  300 mcg SubCUTAneous DAILY    folic acid (FOLVITE) tablet 1 mg  1 mg Oral DAILY    lactulose (CHRONULAC) 10 gram/15 mL solution 30 mL  30 mL Oral DAILY PRN    guaiFENesin ER (MUCINEX) tablet 600 mg  600 mg Oral Q12H    albuterol-ipratropium (DUO-NEB) 2.5 MG-0.5 MG/3 ML  3 mL Nebulization Q6HWA RT    polyethylene glycol (MIRALAX) packet 17 g  17 g Oral DAILY    amiodarone (CORDARONE) tablet 200 mg  200 mg Oral DAILY    [Held by provider] pantoprazole (PROTONIX) tablet 40 mg  40 mg Oral BID    midodrine (PROAMATINE) tablet 5 mg  5 mg Oral BID WITH MEALS    apixaban (ELIQUIS) tablet 5 mg  5 mg Oral BID    sodium chloride (NS) flush 5-40 mL  5-40 mL IntraVENous Q8H    sodium chloride (NS) flush 5-40 mL  5-40 mL IntraVENous PRN    acetaminophen (TYLENOL) tablet 650 mg  650 mg Oral Q6H PRN    Or    acetaminophen (TYLENOL) suppository 650 mg  650 mg Rectal Q6H PRN    polyethylene glycol (MIRALAX) packet 17 g  17 g Oral DAILY PRN    ondansetron (ZOFRAN ODT) tablet 4 mg  4 mg Oral Q8H PRN    Or    ondansetron (ZOFRAN) injection 4 mg  4 mg IntraVENous Q6H PRN       Review of Systems:   A comprehensive review of systems was negative except for that written in the HPI. Objective:   Physical Exam:     Visit Vitals  BP 94/70 (BP 1 Location: Left upper arm, BP Patient Position: At rest;Supine)   Pulse (!) 103   Temp 98.4 °F (36.9 °C)   Resp 20   Ht 5' 4\" (1.626 m)   Wt 46 kg (101 lb 6.6 oz)   SpO2 99%   BMI 17.41 kg/m²    O2 Flow Rate (L/min): 2 l/min O2 Device: Nasal cannula    Temp (24hrs), Av.9 °F (36.6 °C), Min:97.2 °F (36.2 °C), Max:98.4 °F (36.9 °C)    No intake/output data recorded.  1901 -  0700  In: 240 [P.O.:240]  Out: -     General:   Awake and alert   Lungs:   Clear to auscultation bilaterally. Chest wall:  No tenderness or deformity. Heart:  Regular rate and rhythm, S1, S2 normal, no murmur, click, rub or gallop. Abdomen:   Soft, non-tender. Bowel sounds normal. No masses,  No organomegaly. Extremities: Extremities normal, atraumatic, no cyanosis or edema. Pulses: 2+ and symmetric all extremities. Skin: Skin color, texture, turgor normal. No rashes or lesions   Neurologic: CNII-XII intact. No gross focal deficits         Data Review:       Recent Days:  Recent Labs     22  0855 22  0728   WBC 0.5* 0.6*   HGB 8.8* 9.5*   HCT 28.4* 30.4*    232     No results for input(s): NA, K, CL, CO2, GLU, BUN, CREA, CA, MG, PHOS, ALB, TBIL, TBILI, ALT, INR, INREXT, INREXT in the last 72 hours. No lab exists for component: SGOT  No results for input(s): PH, PCO2, PO2, HCO3, FIO2 in the last 72 hours.     24 Hour Results:  No results found for this or any previous visit (from the past 24 hour(s)). XR CHEST PORT   Final Result   Comparison 1/29/2022. Interval development of mild pulmonary edema. CTA CHEST W OR W WO CONT   Final Result   Positive for pulmonary embolus, right lower lobe, acute versus subacute; the   report communicated to Dorcus Eric, 1451 hours. Cardiomegaly with bilateral   pleural effusions. Atherosclerosis. Hiatus hernia with partially intrathoracic   stomach. Distended gallbladder with sludge. Moderate rectal distention by stool. Degenerative changes of the spine. Convex leftward scoliosis, centered at the   L1-L2 level. CT ABD PELV W CONT   Final Result   Positive for pulmonary embolus, right lower lobe, acute versus subacute; the   report communicated to Dorcus Eric, 1451 hours. Cardiomegaly with bilateral   pleural effusions. Atherosclerosis. Hiatus hernia with partially intrathoracic   stomach. Distended gallbladder with sludge. Moderate rectal distention by stool. Degenerative changes of the spine. Convex leftward scoliosis, centered at the   L1-L2 level. XR CHEST PORT   Final Result   Comparison 6/22/2020. Central line position as above. Right hemithorax pleural fluid/pleural thickening. Additional chronic findings   as above. 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   On Eliquis    Peptic ulcer disease with recent large duodenal bulb ulcer      Plan:    Continue supportive care  Hematology following, was given Granix yesterday  Continue to follow CBC  Obtain repeat abdominal x-ray to assess for constipation, if no significant constipation will hold off on enema      Care Plan discussed with: Patient/Family    Disposition: Possible SNF, awaiting some improvement in WBC      Total time spent with patient: 30 minutes.     Phil Tom MD

## 2022-02-04 NOTE — PROGRESS NOTES
Problem: Dysphagia (Adult)  Goal: *Acute Goals and Plan of Care (Insert Text)  Description: Speech Therapy Goals  Initiated 1/31/2022  -Patient will tolerate soft/bite size diet with thin liquids without signs/symptoms of aspiration given no cues within 7 day(s). [ ] Not met  [ ]  MET   [ x] Progressing  [ ] Discontinue  -Patient will demonstrate understanding of swallow safety precautions and aspiration precautions, diet recs with no cues within 7  day(s). [ ] Not met  [ ]  MET   [x ] Progressing  [ ] Aidan Sparkse  2/4/2022 1150 by Diana SMILEY  Outcome: Progressing Towards Goal   SPEECH [address] TREATMENT  Patient: Katie Fraga (4 y.o. female)  Date: 2/4/2022  Diagnosis: Acute respiratory failure with hypoxia ([address] [J96.01]  Pleural effusion [J90]  Lung abscess ([address] [J85.2] Acute respiratory failure with hypoxia ([address]       Precautions: aspiration Fall    ASSESSMENT:  Pt seen for follow-up, pt is positioned upright in chair. SonGregorio is present with pt's consent. Pt is moaning in pain. Pt accepts limited trials of thin via straw, puree and soft solids. With min trials, oral phase largely [place]. Pharyngeal phase with mild delay, WFL once initiated. With sequential straw sips, there is wet congested cough. With single straw sips, no overt s/s aspiration. PLAN:  Recommendations and Planned Interventions: At this time, pt's oropharyngeal sw function is [place] for soft and bite size diet with SINGLE SIPS Of thin liquids with 1:1 assistance with ALL PO intake, STRICT aspiration and GERD precautions, monitor pt closely for s/s aspiration, meds crushed if able in puree, FEED ONLY IF AWAKE AND ALERT. Will defer to MD re: diet advancement to solids due to persistent pain reported. Discussed with MD.     Patient continues to benefit from skilled intervention to address the above impairments. Continue treatment per established plan of care.   Frequency/Duration: Patient will be followed by speech-language pathology 1 time a week to address goals. Discharge Recommendations:  cont SLP tx at this time     SUBJECTIVE:   Patient alert, accepts limited trials. OBJECTIVE:     Cognitive and Communication Status:  Neurologic State: Alert  Orientation Level: Oriented to person,Oriented to place  Cognition: Impaired decision making      Pain:  Pain Scale 1: Numeric (0 - 10)  Pain Intensity 1: 0       After treatment:   Patient left in no apparent distress sitting up in chair, Call bell within reach, Nursing notified, and Caregiver / family present    COMMUNICATION/EDUCATION:   Patient was educated regarding her deficit(s) of dysphagia, swallow safety precautions, diet recs and POC. She demonstrated Fair understanding as evidenced by verbal responsiveness. The patient's plan of care including recommendations, planned interventions, and recommended diet changes were discussed with: Registered nurse and Physician.      Mamadou James M.S. CCC-SLP  Time Calculation: 12 mins

## 2022-02-04 NOTE — PROGRESS NOTES
Pulmonary/CC Progress Note  Elderly patient with history of extensive tobacco abuse, COPD recently treated for COVID-19 pneumonia in December 2021. Recently she was hospitalized with right middle lobe abscess and had been on IV meropenem. CT scan showed subacute thrombus    Subjective:   Daily Progress Note: 2/4/2022 4:16 PM    Patient without significant distress. Afebrile. Weak and debilitated. Yesterday she was given Fleet enema with bowel movement. Daughter present at bedside who still think that patient has 2 have more bowel movements. According to daughter patient is still morning for lower abdominal discomfort from constipation.     Current Facility-Administered Medications   Medication Dose Route Frequency    tbo-filgrastim (GRANIX) injection 300 mcg  300 mcg SubCUTAneous DAILY    folic acid (FOLVITE) tablet 1 mg  1 mg Oral DAILY    lactulose (CHRONULAC) 10 gram/15 mL solution 30 mL  30 mL Oral DAILY PRN    guaiFENesin ER (MUCINEX) tablet 600 mg  600 mg Oral Q12H    albuterol-ipratropium (DUO-NEB) 2.5 MG-0.5 MG/3 ML  3 mL Nebulization Q6HWA RT    polyethylene glycol (MIRALAX) packet 17 g  17 g Oral DAILY    amiodarone (CORDARONE) tablet 200 mg  200 mg Oral DAILY    [Held by provider] pantoprazole (PROTONIX) tablet 40 mg  40 mg Oral BID    midodrine (PROAMATINE) tablet 5 mg  5 mg Oral BID WITH MEALS    apixaban (ELIQUIS) tablet 5 mg  5 mg Oral BID    sodium chloride (NS) flush 5-40 mL  5-40 mL IntraVENous Q8H    sodium chloride (NS) flush 5-40 mL  5-40 mL IntraVENous PRN    acetaminophen (TYLENOL) tablet 650 mg  650 mg Oral Q6H PRN    Or    acetaminophen (TYLENOL) suppository 650 mg  650 mg Rectal Q6H PRN    polyethylene glycol (MIRALAX) packet 17 g  17 g Oral DAILY PRN    ondansetron (ZOFRAN ODT) tablet 4 mg  4 mg Oral Q8H PRN    Or    ondansetron (ZOFRAN) injection 4 mg  4 mg IntraVENous Q6H PRN        Review of Systems  Unchanged from initial consult    Objective:     Visit Vitals  BP 94/70 (BP 1 Location: Left upper arm, BP Patient Position: At rest;Supine)   Pulse (!) 103   Temp 98.4 °F (36.9 °C)   Resp 20   Ht 5' 4\" (1.626 m)   Wt 46 kg (101 lb 6.6 oz)   SpO2 99%   BMI 17.41 kg/m²    O2 Flow Rate (L/min): 2 l/min O2 Device: Nasal cannula    Temp (24hrs), Av.9 °F (36.6 °C), Min:97.2 °F (36.2 °C), Max:98.4 °F (36.9 °C)      No intake/output data recorded.  1901 -  0700  In: 240 [P.O.:240]  Out: -     HEENT: Normal HEENT exam.    Lungs:  Normal effort and normal respiratory rate. Heart: Normal rate. Abdomen: Abdomen is soft. Neurological: Patient is alert. Skin:  Warm and dry. Lab/Data Review:  Recent Labs     22  0855 22  0728   WBC 0.5* 0.6*   HGB 8.8* 9.5*   HCT 28.4* 30.4*    232     No results for input(s): NA, K, CL, CO2, GLU, BUN, CREA, CA, MG, PHOS, ALB, TBIL, TBILI, ALT, INR, INREXT, INREXT in the last 72 hours. No lab exists for component: SGOT  No results for input(s): PH, PCO2, PO2, HCO3, FIO2 in the last 72 hours. Diagnostics   CXR Results  (Last 48 hours)    None             Assessment/Plan:     Principal Problem:    Acute respiratory failure with hypoxia ([address] (2022)    Active Problems:    Pulmonary emboli ([address] (2022)      Lung abscess ([address] (2022)      Deep vein thrombosis (DVT) of lower extremity (HCC) (2022)      Atrial fibrillation ([address] (2022)      Constipation (2022)      GIB (gastrointestinal bleeding) (2022)      Overview: Recent      Chronic gastric ulcer (2022)      Overview: Recent      COVID-19 (2022)      Overview: Dec 2021      Anemia (2022)      Hypokalemia (2022)      Bilateral pleural effusion (2022)      Gallbladder anomaly (2022)      Hiatal hernia (2022)      Hypotension (2022)      Pleural effusion (2022)      1. Pneumonia. An 27-year-old frail  female with a history of very extensive tobacco abuse. She has underlying chronic obstructive pulmonary disease but does not appear to be in exacerbation. She had a recent hospitalization in [facility] a couple of times. In 12/2021, she had COVID-19 pneumonia. More recently hospitalized with right middle lobe abscess. Patient underwent CT-guided drainage of abscess only 3 cc of foul-smelling fluid was drained after which she improved, WBC count came back to normal and she was discharged from the hospital on IV meropenem. .     Also venous thromboembolism and has been on Eliquis. Meropenem and Eliquis have been resumed. CTA shows subacute thrombus. There is no focal airspace disease. From pulmonary perspective patient can be discharged to home. I spoke to patient's daughter at bedside, patient has arrangement for hospital bed at home, she is also has arrangement for home health care and physical therapy. Continue with oxygen 2 L which she already has at home. Her oral intake has been poor and there is apparently some weight loss. 2.  Small to moderate bilateral pleural effusions. She appears to be third spacing. She also has dependent edema. Albumin is 1.7 only. Her EF is normal.  I believe that her effusions and third spacing is due to hypoalbuminemia and malnutrition. 3.  Atrial fibrillation and venous thromboembolism. Continue with Eliquis. Monitor hemoglobin. 4.  History of gastrointestinal bleed due to gastric ulcer, status post cauterization at [facility]. Continue with Protonix p.o. b.i.d. She also has gallbladder sludge. 5.  Constipation. Further management as per primary attending. Patient getting Fleet enema today. Magnesium sulfate can be tried as well  6. Enlarged thyroid and part of that appears to be retrosternal.  She also has mediastinal calcified lymphadenopathy. I believe this is all part and parcel of prior granulomatous disease. No acute problems.   7.  Leukopenia:  WBC count is only 0.5  Hematology following. Patient is off of antibiotics.     Care Plan discussed with: Patient's daughter at [facility] [place]

## 2022-02-04 NOTE — PROGRESS NOTES
Spiritual Care Assessment/Progress Note  [facility]      NAME: Rodney Agosto      MRN: [id number]  AGE: 79 y.o. SEX: female  Anabaptism Affiliation: Thomas Parkinson   Language: Unknown     2/4/2022     Total Time (in minutes): 30     Spiritual Assessment begun in [address] through conversation with:         [x]Patient        [x] Family    [] Friend(s)        Reason for Consult: Initial/Spiritual assessment, patient floor     Spiritual beliefs: (Please include comment if needed)     [] Identifies with a roxane tradition:         [] Supported by a roxane community:            [] Claims no spiritual orientation:           [] Seeking spiritual identity:                [x] Adheres to an individual form of spirituality:           [] Not able to assess:                           Identified resources for coping:      [] Prayer                               [] Music                  [] Guided Imagery     [x] Family/friends                 [] Pet visits     [] Devotional reading                         [] Unknown     [] Other:                                              Interventions offered during this visit: (See comments for more details)    Patient Interventions: Affirmation of emotions/emotional suffering,Catharsis/review of pertinent events in supportive environment,Coping skills reviewed/reinforced,Initial/Spiritual assessment, patient floor,Prayer (assurance of)     Family/Friend(s):  Affirmation of roxane,Affirmation of emotions/emotional suffering,Catharsis/review of pertinent events in supportive environment,Coping skills reviewed/reinforced,Initial Assessment,Guidance concerning next steps/process to be expected,Normalization of emotional/spiritual concerns     Plan of Care:     [] Support spiritual and/or cultural needs    [] Support AMD and/or advance care planning process      [] Support grieving process   [] Coordinate Rites and/or Rituals    [] Coordination with community clergy   [] No spiritual needs identified at this time   [] Detailed Plan of Care below (See Comments)  [] Make referral to Music Therapy  [] Make referral to Pet Therapy     [] Make referral to Addiction services  [] Make referral to [facility]  [] Make referral to Spiritual Care Partner  [] No future visits requested        [x] Contact Spiritual Care for further referrals     Comments: The purpose of the visit was to do a spiritual assessment on the patient. The patient was being visited by her daughter, while she seemed to be resting. She mentioned that she was cold and she would be more comfortable if the room was warmer. She expressed feeling encouraged by her daughter's presence. She mentioned that she has found shanika and strength from being with her family. She shared that she finds nature and being in her garden spiritual. She hopes that she will get better and be able to return home soon. She expressed that she misses being at her. She shared that family support is very important to her. Her daughter shared how her mother has been such a pillar of strength, an example, and person who has cared for her sibling as well as others  In and throughout the community. The  listened empathically, supported ongoing spiritual enrichment. And supported the the overall wellness of the patient. [facility] Fatmata Roberto.    can be reached by calling the  at [facility]  [phone]

## 2022-02-04 NOTE — PROGRESS NOTES
Progress Note     CHIEF COMPLAINT:     History: Jenna Lindo is a 79 y.o. WHITE/NON- female admitted on 1/29/2022 by Viviane Hobbs MD whose history is given by a review of chart and the patient. We are asked by Hospitalist to see in consultation for atrial fibrillation. Patient has a past medical history of atrial fibrillation, anemia, acute respiratory failure, lung abscess, and chronic gastric ulcer. Recently admitted and discharged to [facility] with similar complaints. Review of Systems     A comprehensive review of systems was negative except for that written in the [facility]. [address] [place] [address] St Day: 7     2/1/22: Patient is awake and alert this morning. She denies chest pain or discomfort. Continues to complain of abdominal pain, no bowel movement as of yet. Her daughter is at the bedside and has questions regarding her medications. 2/2/22: Resting comfortably this am. No acute events noted overnight. 2/3/22: Lying in bed, moaning with her eyes close, she offers no specific complaints. Patient's son is at the bedside. 2/4/22: No acute events noted overnight. Telemetry: Atrial fibrillation; heart rate 120s. Case discussed with Dr. Radha Farrar and our impression and plan are as follows:      1. Atrial fibrillation:   - in atrial flutter today per telemetry, HR 70-120s  - continue amiodarone and Eliquis  - continue telemetry  - 2/2/22 EKG: atrial fibrillation: HR: 99, QTc 395.  - maintain electrolytes and replete as needed. - treat underlying infection       2. History of PE  - continue Eliquis    Medical Issues:  has no past medical history on file.      Patient Active Problem List    Diagnosis Date Noted    Pulmonary emboli ([address] 01/29/2022    Acute respiratory failure with hypoxia ([address] 01/29/2022    Lung abscess ([address] 01/29/2022    Deep vein thrombosis (DVT) of lower extremity ([address] 01/29/2022    Atrial fibrillation ([address] 01/29/2022    Constipation 01/29/2022    GIB (gastrointestinal bleeding) 2022    Chronic gastric ulcer 2022    COVID-19 2022    Anemia 2022    Hypokalemia 2022    Bilateral pleural effusion 2022    Gallbladder anomaly 2022    Hiatal hernia 2022    Hypotension 2022    Pleural effusion 2022        VITAL SIGNS:        Visit Vitals  BP 94/70 (BP 1 Location: Left upper arm, BP Patient Position: At rest;Supine)   Pulse (!) 103   Temp 98.4 °F (36.9 °C)   Resp 20   Ht 5' 4\" (1.626 m)   Wt 46 kg (101 lb 6.6 oz)   SpO2 99%   BMI 17.41 kg/m²        Temp (24hrs), Av.9 °F (36.6 °C), Min:97.2 °F (36.2 °C), Max:98.4 °F (36.9 °C)         MEDICATIONS       Current Facility-Administered Medications:     magnesium citrate solution 148 mL, 148 mL, Oral, NOW, Master Recio MD    tbo-filgrastim [facility]) injection 300 mcg, 300 mcg, SubCUTAneous, DAILY, Theresa Samayoa MD, 300 mcg at  2566    folic acid (FOLVITE) tablet 1 mg, 1 mg, Oral, DAILY, Theresa Samayoa MD, 1 mg at 22 0906    lactulose (CHRONULAC) 10 gram/15 mL solution 30 mL, 30 mL, Oral, DAILY PRN, Temo Rueda MD, 30 mL at 22 1420    guaiFENesin ER (MUCINEX) tablet 600 mg, 600 mg, Oral, Q12H, Nalini Daniels MD, 600 mg at 22 0906    albuterol-ipratropium (DUO-NEB) 2.5 MG-0.5 MG/3 ML, 3 mL, Nebulization, Q6HWA RT, Michael Alaniz DO, 3 mL at 22 0913    polyethylene glycol (MIRALAX) packet 17 g, 17 g, Oral, DAILY, Reggie Loya MD, 17 g at 22 1622    amiodarone (CORDARONE) tablet 200 mg, 200 mg, Oral, DAILY, Temo Rueda MD, 200 mg at 22 0906    [Held by provider] pantoprazole (PROTONIX) tablet 40 mg, 40 mg, Oral, BID, Temo Rueda MD, 40 mg at 22 1017    midodrine (PROAMATINE) tablet 5 mg, 5 mg, Oral, BID WITH MEALS, Temo Rueda MD, 5 mg at 22 5992    apixaban (ELIQUIS) tablet 5 mg, 5 mg, Oral, BID, Temo Rueda MD, 5 mg at 22 0906    sodium chloride (NS) flush 5-40 mL, 5-40 mL, IntraVENous, Q8H, Macey Clinton MD, 10 mL at 02/04/22 0636    sodium chloride (NS) flush 5-40 mL, 5-40 mL, IntraVENous, PRN, Macey Chowdary MD    acetaminophen (TYLENOL) tablet 650 mg, 650 mg, Oral, Q6H PRN, 650 mg at 02/03/22 0845 **OR** acetaminophen (TYLENOL) suppository 650 mg, 650 mg, Rectal, Q6H PRN, Macey Chowdary MD    polyethylene glycol (MIRALAX) packet 17 g, 17 g, Oral, DAILY PRN, Macey Chowdary MD    ondansetron (ZOFRAN ODT) tablet 4 mg, 4 mg, Oral, Q8H PRN **OR** ondansetron (ZOFRAN) injection 4 mg, 4 mg, IntraVENous, Q6H PRN, Camilo Murray MD      INTAKE / OUPUT       Intake/Output Summary (Last 24 hours) at 2/4/2022 1223  Last data filed at 2/3/2022 1333  Gross per 24 hour   Intake 60 ml   Output    Net 60 ml              EXAM:       .Physical Exam  Constitutional:       General: She is not in acute distress. Appearance: She is ill-appearing. Cardiovascular:      Rate and Rhythm: Normal rate and regular rhythm. Pulses: Normal pulses. Heart sounds: Normal heart sounds. Pulmonary:      Effort: Pulmonary effort is normal. No respiratory distress. Breath sounds: Normal breath sounds. No stridor. No wheezing, rhonchi or rales. Abdominal:      General: Abdomen is flat. Bowel sounds are normal. There is no distension. Palpations: Abdomen is soft. Tenderness: There is no abdominal tenderness. There is no rebound. Skin:     General: Skin is warm and dry. Coloration: Skin is pale. Neurological:      General: No focal deficit present. Mental Status: She is alert and oriented to person, place, and time.    Psychiatric:         Mood and Affect: Mood normal.         Behavior: Behavior normal.          DATA:      Recent Labs     02/03/22  0855 02/02/22  0728   WBC 0.5* 0.6*   HGB 8.8* 9.5*    232     No results for input(s): NA, K, CL, CO2, GLU, BUN, CREA, CA, MG, PHOS, LAC, ALB, TBIL, ALT, AML, LPSE in the last 72 hours.    No lab exists for component: SGOT         IMAGING:         Results for orders placed or performed during the hospital encounter of 01/29/22   EKG, 12 LEAD, INITIAL   Result Value Ref Range    Ventricular Rate 98 BPM    Atrial Rate 300 BPM    QRS Duration 82 ms    Q-T Interval 342 ms    QTC Calculation (Bezet) 436 ms    Calculated R Axis -10 degrees    Calculated T Axis -120 degrees    Diagnosis       Atrial fibrillation  Low voltage QRS  Septal infarct (cited on or before 22-JUN-2020)  Abnormal ECG  When compared with ECG of 22-JUN-2020 13:28,  Significant changes have occurred  Confirmed by Marlene Wyman ([id number]) on 1/31/2022 5:22:09 PM          History:     PMH:  has no past medical history on file. PSH:   has a past surgical history that includes pr egd deliver thermal energy sphnctr/cardia gerd. FHX: Family history is unknown by patient.        ALL:   Allergies   Allergen Reactions    Penicillins Other (comments)        MEDS:   [x] Reviewed - As Below   [] Not reviewed    Current Facility-Administered Medications   Medication    magnesium citrate solution 148 mL    tbo-filgrastim (GRANIX) injection 002 mcg    folic acid (FOLVITE) tablet 1 mg    lactulose (CHRONULAC) 10 gram/15 mL solution 30 mL    guaiFENesin ER (MUCINEX) tablet 600 mg    albuterol-ipratropium (DUO-NEB) 2.5 MG-0.5 MG/3 ML    polyethylene glycol (MIRALAX) packet 17 g    amiodarone (CORDARONE) tablet 200 mg    [Held by provider] pantoprazole (PROTONIX) tablet 40 mg    midodrine (PROAMATINE) tablet 5 mg    apixaban (ELIQUIS) tablet 5 mg    sodium chloride (NS) flush 5-40 mL    sodium chloride (NS) flush 5-40 mL    acetaminophen (TYLENOL) tablet 650 mg    Or    acetaminophen (TYLENOL) suppository 650 mg    polyethylene glycol (MIRALAX) packet 17 g    ondansetron (ZOFRAN ODT) tablet 4 mg    Or    ondansetron (ZOFRAN) injection 4 mg         Leonel Lovell NP       Please do not hesitate to call with any further questions or concerns. [facility]  [address]    [facility] [place], [address] [facility]  [phone]

## 2022-02-04 NOTE — PROGRESS NOTES
Infectious Diseases Progress Note  Alex Sorto MD  [phone]    Date:2022       Room:[facility]  Bacilio Leary     YOB: 1940     Age:81 y.o. 81F with past medical history of hypertension hyperlipidemia CVA. Reportedly positive for coronavirus the first week of 2021.     22 presents to [facility] with altered mental status. During the ED course was found to have an infiltrate on chest imaging, and started on broad spectrum antibiotics. Admitted to hospital and improved during the hospital course with meropenem. Hospital course further complicated by pulmonary embolism. 22 discharged from hospital on oral anticoagulation.     22 returns to hospital for acute hypoxic respiratory failure. CT reveals new development of Cavitary lesion of the inferior lateral right middle lobe. Admitted to hospital and I have been asked to see her in consultation.     1/10/22 underwent EGD with Dr Vu Fees:   Large duodenal bulb ulcer - injected and cauterized  Hiatal hernia  Recommendations:   PPI continuous infusion  NPO, IVF     For the cavitary pneumonia I had her on empiric meropenem with an anticipated stop date of 22.  22 discharged from [facility]     22 presents to [facility] because of lower abdominal pains. Reports constipation for the past few days. No improvement with enemas at home. Admitted to hospital for stabilization and further workup of her condition. Subjective    Subjective:  Symptoms:  Stable. Review of Systems   All other systems reviewed and are negative.     Objective         Vitals Last 24 Hours:  TEMPERATURE:  Temp  Av.9 °F (36.6 °C)  Min: 97.2 °F (36.2 °C)  Max: 98.4 °F (36.9 °C)  RESPIRATIONS RANGE: Resp  Av.8  Min: 16  Max: 20  PULSE OXIMETRY RANGE: SpO2  Av %  Min: 93 %  Max: 99 %  PULSE RANGE: Pulse  Av.6  Min: 60  Max: 109  BLOOD PRESSURE RANGE: Systolic (54THE), XCC:148 , Min:93 , UPX:314   ; Diastolic (24hrs), Av, Min:50, Max:76    I/O (24Hr): Intake/Output Summary (Last 24 hours) at 2022 1158  Last data filed at 2/3/2022 1333  Gross per 24 hour   Intake 60 ml   Output    Net 60 ml     Objective:  General Appearance:  Comfortable. Vital signs: (most recent): Blood pressure 94/70, pulse (!) 103, temperature 98.4 °F (36.9 °C), resp. rate 20, height 5' 4\" (1.626 m), weight 46 kg (101 lb 6.6 oz), SpO2 99 %. Vital signs are normal.    HEENT: Normal HEENT exam.    Lungs:  Normal effort and normal respiratory rate. Heart: Normal rate. Abdomen: Abdomen is soft. Neurological: Patient is alert. Skin:  Warm and dry. Labs/Imaging/Diagnostics    Labs:  CBC:  Recent Labs     22  0855 22  0728   WBC 0.5* 0.6*   RBC 2.83* 3.04*   HGB 8.8* 9.5*   HCT 28.4* 30.4*   .4* 100.0*   RDW 19.0* 19.3*    232     CHEMISTRIES:  No results for input(s): NA, K, CL, CO2, BUN, CA, PHOS, MG in the last 72 hours. No lab exists for component: CREATININE, GLUCOSEPT/INR:  No results for input(s): INR, INREXT, INREXT in the last 72 hours. No lab exists for component: PROTIME  APTT:  No results for input(s): APTT in the last 72 hours. LIVER PROFILE:  No results for input(s): AST, ALT in the last 72 hours. No lab exists for component: Sherri Salinas, ALKPHOS  Lab Results   Component Value Date/Time    ALT (SGPT) 73 2022 07:21 AM    AST (SGOT) 57 (H) 2022 07:21 AM    Alk. phosphatase 97 2022 07:21 AM    Bilirubin, total 2.0 (H) 2022 07:21 AM       Imaging Last 24 Hours:  No results found.   Assessment//Plan   Principal Problem:    Acute respiratory failure with hypoxia ([address] (2022)    Active Problems:    Pulmonary emboli ([facility] (2022)      Lung abscess ([facility] (2022)      Deep vein thrombosis (DVT) of lower extremity ([facility] (2022)      Atrial fibrillation ([address] (2022)      Constipation (2022)      GIB (gastrointestinal bleeding) (1/29/2022)      Overview: Recent      Chronic gastric ulcer (1/29/2022)      Overview: Recent      COVID-19 (1/29/2022)      Overview: Dec 2021      Anemia (1/29/2022)      Hypokalemia (1/29/2022)      Bilateral pleural effusion (1/29/2022)      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 Reportedly positive for coronavirus the first week of december 2021.     1/2/22 presents to [facility] with altered mental status. During the ED course was found to have an infiltrate on chest imaging, and started on broad spectrum antibiotics. Admitted to hospital and improved during the hospital course with meropenem. Hospital course further complicated by pulmonary embolism. 1/6/22 discharged from hospital on oral anticoagulation.     1/7/22 returns to hospital for acute hypoxic respiratory failure. CT reveals new development of Cavitary lesion of the inferior lateral right middle lobe. Admitted to hospital and I have been asked to see her in consultation.     1/10/22 underwent EGD with Dr Rhoda Quijano:   Large duodenal bulb ulcer - injected and cauterized  Hiatal hernia  Recommendations:   PPI continuous infusion  NPO, IVF     For the cavitary pneumonia I had her on empiric meropenem with an anticipated stop date of 2/4/22.  1/24/22 discharged from [facility]     1/29/22 presents to [facility] because of lower abdominal pains. Reports constipation for the past few days. No improvement with enemas at home.  Admitted to hospital for stabilization and further workup of her condition.     MICROBIOLOGY     1/2/22              Covid 19          Positive  1/2/22              Blood               Negative  1/3/22              Urine                Negative  1/7/22              Blood               Negative    1/29/22            Covid 19          Negative  1/29/22 Blood               Negative     ASSESSMENT AND RECOMMENDATIONS     1) Pneumonia with development in the setting of SARS-CoV-2 coronaviral respiratory syndrome. Further complicated by new development of right sided cavitary lesion, improved with a long course of meropenem.                 Repeat CT chest shows improvement in her pulmonary condition              Meropenem iv through 2/1/22    2) Development of neutropenia during this hospital stay.      No objection to [employer] signed by Rachel Clark MD on 2/4/2022 at 12:17 PM

## 2022-02-04 NOTE — PROGRESS NOTES
Subjective   Subjective:      HPI :    Objective  Patient feeling tired. Constipation better.     Current Facility-Administered Medications:     tbo-filgrastim (GRANIX) injection 300 mcg, 300 mcg, SubCUTAneous, DAILY, Corinne Vargas MD, 300 mcg at 37/64/95 1603    folic acid (FOLVITE) tablet 1 mg, 1 mg, Oral, DAILY, Corinne Vargas MD, 1 mg at 02/04/22 0906    guaiFENesin ER (MUCINEX) tablet 600 mg, 600 mg, Oral, Q12H, Nalini Daniels MD, 600 mg at 02/04/22 0906    albuterol-ipratropium (DUO-NEB) 2.5 MG-0.5 MG/3 ML, 3 mL, Nebulization, Q6HWA RT, Michael Alaniz DO, 3 mL at 02/04/22 1456    polyethylene glycol (MIRALAX) packet 17 g, 17 g, Oral, DAILY, Reggie Loya MD, 17 g at 02/02/22 1622    amiodarone (CORDARONE) tablet 200 mg, 200 mg, Oral, DAILY, Melissa Stuart MD, 200 mg at 02/04/22 0906    [Held by provider] pantoprazole (PROTONIX) tablet 40 mg, 40 mg, Oral, BID, Melissa Stuart MD, 40 mg at 02/01/22 1017    midodrine (PROAMATINE) tablet 5 mg, 5 mg, Oral, BID WITH MEALS, Melissa Stuart MD, 5 mg at 02/04/22 0906    apixaban (ELIQUIS) tablet 5 mg, 5 mg, Oral, BID, Melissa Stuart MD, 5 mg at 02/04/22 0906    sodium chloride (NS) flush 5-40 mL, 5-40 mL, IntraVENous, Q8H, OzGirma MD, 10 mL at 02/04/22 1526    sodium chloride (NS) flush 5-40 mL, 5-40 mL, IntraVENous, PRN, Melissa Stuart MD    acetaminophen (TYLENOL) tablet 650 mg, 650 mg, Oral, Q6H PRN, 650 mg at 02/03/22 0845 **OR** acetaminophen (TYLENOL) suppository 650 mg, 650 mg, Rectal, Q6H PRN, Ree Goad, Catha Pines, MD    polyethylene glycol (MIRALAX) packet 17 g, 17 g, Oral, DAILY PRN, Girma Yanez MD    ondansetron (ZOFRAN ODT) tablet 4 mg, 4 mg, Oral, Q8H PRN **OR** ondansetron (ZOFRAN) injection 4 mg, 4 mg, IntraVENous, Q6H PRN, Girma Yanez MD    Objective:     Visit Vitals  BP 97/61 (BP 1 Location: Left upper arm, BP Patient Position: At rest;Supine)   Pulse (!) 56   Temp 98.5 °F (36.9 °C)   Resp 18   Ht 5' 4\" (1.626 m) Wt 46 kg (101 lb 6.6 oz)   SpO2 92%   BMI 17.41 kg/m²      Physical Exam   Lungs:CTA  HEART:RRR  ABD;NT,BS+  EXT:no edema    @Objective    [email or web address]     Data Review:   Recent Results (from the past 24 hour(s))   CBC W/O DIFF    Collection Time: 02/04/22  1:24 PM   Result Value Ref Range    WBC 0.7 (LL) 3.6 - 11.0 K/uL    RBC 2.90 (L) 3.80 - 5.20 M/uL    HGB 9.0 (L) 11.5 - 16.0 g/dL    HCT 29.2 (L) 35.0 - 47.0 %    .7 (H) 80.0 - 99.0 FL    MCH 31.0 26.0 - 34.0 PG    MCHC 30.8 30.0 - 36.5 g/dL    RDW 18.6 (H) 11.5 - 14.5 %    PLATELET 321 752 - 857 K/uL    MPV 10.9 8.9 - 12.9 FL    NRBC 0.0 0.0  WBC    ABSOLUTE NRBC 0.00 0.00 - 8.55 K/uL   METABOLIC PANEL, BASIC    Collection Time: 02/04/22  1:24 PM   Result Value Ref Range    Sodium 141 136 - 145 mmol/L    Potassium 3.8 3.5 - 5.1 mmol/L    Chloride 105 97 - 108 mmol/L    CO2 32 21 - 32 mmol/L    Anion gap 4 (L) 5 - 15 mmol/L    Glucose 135 (H) 65 - 100 mg/dL    BUN 27 (H) 6 - 20 mg/dL    Creatinine 0.71 0.55 - 1.02 mg/dL    BUN/Creatinine ratio 38 (H) 12 - 20      GFR est AA >60 >60 ml/min/1.73m2    GFR est non-AA >60 >60 ml/min/1.73m2    Calcium 7.8 (L) 8.5 - 10.1 mg/dL   MAGNESIUM    Collection Time: 02/04/22  1:24 PM   Result Value Ref Range    Magnesium 2.0 1.6 - 2.4 mg/dL       Leukopenia neutropenia:Most likely due to meropenem. R/o due to amiadarone  Less likely due to bone marrow pathology. Meropenem d/cd by ID. White count still very low. ANC 0.0. If not better may need bone marrow biopsy. Other option supportive care due to pt general condition. Continue granix. Neutropenic precautions. Pt afebrile. Risk of infection discussed with pt daughter. Patient white count slightly better. Monitor. Normocytic hypochromic anemia: Patient iron studies normal .check ferritin . iron studies,SPEP. Hb improving. Pt with folate deficiency. continue folic acid 1mg every day. Constipation:Mangement per primary team.Better. D/w pt nurse.         Pulmonary emboli and DVT:continue eliquis. Monitor for bleeding closely. Lung abscess:S/p Meropenem. ID following. Better. D/w ID.  Rukhsana Arvizu  H/o GI bleeding:no active bleeding clinically. D/w pt and pt daughter.   D/w primary team.                   Plan:

## 2022-02-04 NOTE — PROGRESS NOTES
Attending notified of need for wheelchair order and documentation in order for CM to obtain patient a wheelchair on d/c.    CM continues to follow, awaiting orders and documentation. Current DCP is for patient to return home with family care and HH, [facility].

## 2022-02-05 LAB
ANION GAP SERPL CALC-SCNC: 4 MMOL/L (ref 5–15)
BACTERIA SPEC CULT: NORMAL
BASOPHILS # BLD: 0 K/UL (ref 0–0.1)
BASOPHILS NFR BLD: 0 % (ref 0–1)
BUN SERPL-MCNC: 27 MG/DL (ref 6–20)
BUN/CREAT SERPL: 39 (ref 12–20)
CA-I BLD-MCNC: 7.8 MG/DL (ref 8.5–10.1)
CHLORIDE SERPL-SCNC: 106 MMOL/L (ref 97–108)
CO2 SERPL-SCNC: 28 MMOL/L (ref 21–32)
CREAT SERPL-MCNC: 0.69 MG/DL (ref 0.55–1.02)
DIFFERENTIAL METHOD BLD: ABNORMAL
EOSINOPHIL # BLD: 0 K/UL (ref 0–0.4)
EOSINOPHIL NFR BLD: 0 % (ref 0–7)
ERYTHROCYTE [DISTWIDTH] IN BLOOD BY AUTOMATED COUNT: 18.6 % (ref 11.5–14.5)
GLUCOSE SERPL-MCNC: 105 MG/DL (ref 65–100)
HCT VFR BLD AUTO: 29.9 % (ref 35–47)
HGB BLD-MCNC: 9.3 G/DL (ref 11.5–16)
IMM GRANULOCYTES # BLD AUTO: 0 K/UL
IMM GRANULOCYTES NFR BLD AUTO: 0 %
LYMPHOCYTES # BLD: 0.7 K/UL (ref 0.8–3.5)
LYMPHOCYTES NFR BLD: 58 % (ref 12–49)
MAGNESIUM SERPL-MCNC: 1.8 MG/DL (ref 1.6–2.4)
MCH RBC QN AUTO: 31.4 PG (ref 26–34)
MCHC RBC AUTO-ENTMCNC: 31.1 G/DL (ref 30–36.5)
MCV RBC AUTO: 101 FL (ref 80–99)
MONOCYTES # BLD: 0.5 K/UL (ref 0–1)
MONOCYTES NFR BLD: 40 % (ref 5–13)
NEUTS SEG # BLD: 0 K/UL (ref 1.8–8)
NEUTS SEG NFR BLD: 2 % (ref 32–75)
NRBC # BLD: 0 K/UL (ref 0–0.01)
NRBC BLD-RTO: 0 PER 100 WBC
PLATELET # BLD AUTO: 246 K/UL (ref 150–400)
PMV BLD AUTO: 10.9 FL (ref 8.9–12.9)
POTASSIUM SERPL-SCNC: 4.1 MMOL/L (ref 3.5–5.1)
RBC # BLD AUTO: 2.96 M/UL (ref 3.8–5.2)
RBC MORPH BLD: ABNORMAL
RBC MORPH BLD: ABNORMAL
SODIUM SERPL-SCNC: 138 MMOL/L (ref 136–145)
SPECIAL REQUESTS,SREQ: NORMAL
WBC # BLD AUTO: 1.2 K/UL (ref 3.6–11)

## 2022-02-05 PROCEDURE — 77010033678 HC OXYGEN DAILY

## 2022-02-05 PROCEDURE — 94760 N-INVAS EAR/PLS OXIMETRY 1: CPT

## 2022-02-05 PROCEDURE — 74011250637 HC RX REV CODE- 250/637: Performed by: STUDENT IN AN ORGANIZED HEALTH CARE EDUCATION/TRAINING PROGRAM

## 2022-02-05 PROCEDURE — 74011250637 HC RX REV CODE- 250/637: Performed by: INTERNAL MEDICINE

## 2022-02-05 PROCEDURE — 65270000029 HC RM PRIVATE

## 2022-02-05 PROCEDURE — 94640 AIRWAY INHALATION TREATMENT: CPT

## 2022-02-05 PROCEDURE — 36415 COLL VENOUS BLD VENIPUNCTURE: CPT

## 2022-02-05 PROCEDURE — 74011000250 HC RX REV CODE- 250: Performed by: INTERNAL MEDICINE

## 2022-02-05 PROCEDURE — 80048 BASIC METABOLIC PNL TOTAL CA: CPT

## 2022-02-05 PROCEDURE — 85025 COMPLETE CBC W/AUTO DIFF WBC: CPT

## 2022-02-05 PROCEDURE — 94761 N-INVAS EAR/PLS OXIMETRY MLT: CPT

## 2022-02-05 PROCEDURE — 83735 ASSAY OF MAGNESIUM: CPT

## 2022-02-05 RX ADMIN — APIXABAN 5 MG: 5 TABLET, FILM COATED ORAL at 22:00

## 2022-02-05 RX ADMIN — MIDODRINE HYDROCHLORIDE 5 MG: 5 TABLET ORAL at 18:11

## 2022-02-05 RX ADMIN — GUAIFENESIN 600 MG: 600 TABLET, EXTENDED RELEASE ORAL at 09:34

## 2022-02-05 RX ADMIN — SODIUM CHLORIDE, PRESERVATIVE FREE 10 ML: 5 INJECTION INTRAVENOUS at 06:54

## 2022-02-05 RX ADMIN — IPRATROPIUM BROMIDE AND ALBUTEROL SULFATE 3 ML: .5; 2.5 SOLUTION RESPIRATORY (INHALATION) at 20:42

## 2022-02-05 RX ADMIN — FOLIC ACID 1 MG: 1 TABLET ORAL at 09:34

## 2022-02-05 RX ADMIN — MIDODRINE HYDROCHLORIDE 5 MG: 5 TABLET ORAL at 09:34

## 2022-02-05 RX ADMIN — POLYETHYLENE GLYCOL 3350 17 G: 17 POWDER, FOR SOLUTION ORAL at 09:34

## 2022-02-05 RX ADMIN — APIXABAN 5 MG: 5 TABLET, FILM COATED ORAL at 09:34

## 2022-02-05 RX ADMIN — SODIUM CHLORIDE, PRESERVATIVE FREE 10 ML: 5 INJECTION INTRAVENOUS at 22:00

## 2022-02-05 RX ADMIN — SODIUM CHLORIDE, PRESERVATIVE FREE 10 ML: 5 INJECTION INTRAVENOUS at 14:00

## 2022-02-05 RX ADMIN — IPRATROPIUM BROMIDE AND ALBUTEROL SULFATE 3 ML: .5; 2.5 SOLUTION RESPIRATORY (INHALATION) at 08:08

## 2022-02-05 RX ADMIN — GUAIFENESIN 600 MG: 600 TABLET, EXTENDED RELEASE ORAL at 22:00

## 2022-02-05 RX ADMIN — AMIODARONE HYDROCHLORIDE 200 MG: 200 TABLET ORAL at 09:34

## 2022-02-05 NOTE — PROGRESS NOTES
Problem: Pressure Injury - Risk of  Goal: *Prevention of pressure injury  Description: Document Zeyad Scale and appropriate interventions in the flowsheet. Outcome: Progressing Towards Goal  Note: Pressure Injury Interventions:  Sensory Interventions: Assess changes in LOC,Float heels,Keep linens dry and wrinkle-free,Minimize linen layers    Moisture Interventions: Absorbent underpads,Minimize layers    Activity Interventions: Pressure redistribution bed/mattress(bed type)    Mobility Interventions: Pressure redistribution bed/mattress (bed type),HOB 30 degrees or less,Float heels,PT/OT evaluation    Nutrition Interventions: Offer support with meals,snacks and hydration    Friction and Shear Interventions: HOB 30 degrees or less,Lift sheet,Minimize layers                Problem: Patient Education: Go to Patient Education Activity  Goal: Patient/Family Education  Outcome: Progressing Towards Goal     Problem: Falls - Risk of  Goal: *Absence of Falls  Description: Document Esther Fall Risk and appropriate interventions in the flowsheet.   Outcome: Progressing Towards Goal  Note: Fall Risk Interventions:  Mobility Interventions: Bed/chair exit alarm,Patient to call before getting OOB    Mentation Interventions: Adequate sleep, hydration, pain control,Bed/chair exit alarm    Medication Interventions: Bed/chair exit alarm,Patient to call before getting OOB    Elimination Interventions: Bed/chair exit alarm,Call light in reach,Patient to call for help with toileting needs              Problem: Patient Education: Go to Patient Education Activity  Goal: Patient/Family Education  Outcome: Progressing Towards Goal

## 2022-02-05 NOTE — PROGRESS NOTES
Hospitalist Progress Note               Daily Progress Note: 2/5/2022      Subjective:   Hospital course to date: Patient is an 65-year-old female with a very complex medical history in recent months. She was admitted for COVID-19 in December 2021 and then hospitalized at [facility] on 1/2/2022 with altered mental status. She was treated with meropenem. That hospital stay was complicated by pulmonary embolism. She was discharged on 1/6 on oral anticoagulation. On 1/7 admitted to [facility] for hypoxic respiratory failure. CT showed new development of cavitary lesion lateral right middle lobe. She was seen by ID and started back on IV Merrem with an anticipated stop date of 2/4. During that hospital stay she underwent EGD which showed a large duodenal bulb ulcer. She was discharged from [facility] on 1/24    Patient then presents to our facility on 1/29 with abdominal pain and constipation    Patient has been seen by multiple consultants this hospital stay including pulmonology, cardiology, ID and hematology, the latter for leukopenia and neutropenia.   There was concern that this was related to [facility] which has now been discontinued    ------    Patient seen and evaluated at bedside, of note currently has no new complaints, , discussed with RN    Problem List:  Problem List as of 2/5/2022 Date Reviewed: 1/29/2022          Codes Class Noted - Resolved    Pulmonary emboli ([facility] ICD-10-CM: I26.99  ICD-9-CM: 415.19  1/29/2022 - Present        * (Principal) Acute respiratory failure with hypoxia ([facility] ICD-10-CM: J96.01  ICD-9-CM: 518.81  1/29/2022 - Present        Lung abscess ([facility] ICD-10-CM: J85.2  ICD-9-CM: 513.0  1/29/2022 - Present        Deep vein thrombosis (DVT) of lower extremity ([facility] ICD-10-CM: I82.409  ICD-9-CM: 453.40  1/29/2022 - Present        Atrial fibrillation ([facility] ICD-10-CM: I48.91  ICD-9-CM: 427.31  1/29/2022 - Present        Constipation ICD-10-CM: K59.00  ICD-9-CM: 564.00  1/29/2022 - Present        GIB (gastrointestinal bleeding) (Chronic) ICD-10-CM: K92.2  ICD-9-CM: 578.9  1/29/2022 - Present    Overview Signed 1/29/2022  5:04 PM by Melissa Stuart MD     Recent             Chronic gastric ulcer (Chronic) ICD-10-CM: K25.7  ICD-9-CM: 531.70  1/29/2022 - Present    Overview Signed 1/29/2022  5:05 PM by Melissa Stuart MD     Recent             COVID-19 (Chronic) ICD-10-CM: U07.1  ICD-9-CM: 079.89  1/29/2022 - Present    Overview Signed 1/29/2022  5:05 PM by Melissa Stuart MD     Dec 2021             Anemia ICD-10-CM: D64.9  ICD-9-CM: 285.9  1/29/2022 - Present        Hypokalemia ICD-10-CM: E87.6  ICD-9-CM: 276.8  1/29/2022 - Present        Bilateral pleural effusion ICD-10-CM: J90  ICD-9-CM: 511.9  1/29/2022 - Present        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  ICD-9-CM: 751.60  1/29/2022 - Present        Hiatal hernia ICD-10-CM: K44.9  ICD-9-CM: 553.3  1/29/2022 - Present        Hypotension ICD-10-CM: I95.9  ICD-9-CM: 458.9  1/29/2022 - Present        Pleural effusion ICD-10-CM: J90  ICD-9-CM: 511.9  1/29/2022 - Present              Medications reviewed  Current Facility-Administered Medications   Medication Dose Route Frequency    tbo-filgrastim (GRANIX) injection 300 mcg  300 mcg SubCUTAneous DAILY    folic acid (FOLVITE) tablet 1 mg  1 mg Oral DAILY    guaiFENesin ER (MUCINEX) tablet 600 mg  600 mg Oral Q12H    albuterol-ipratropium (DUO-NEB) 2.5 MG-0.5 MG/3 ML  3 mL Nebulization Q6HWA RT    polyethylene glycol (MIRALAX) packet 17 g  17 g Oral DAILY    amiodarone (CORDARONE) tablet 200 mg  200 mg Oral DAILY    [Held by provider] pantoprazole (PROTONIX) tablet 40 mg  40 mg Oral BID    midodrine (PROAMATINE) tablet 5 mg  5 mg Oral BID WITH MEALS    apixaban (ELIQUIS) tablet 5 mg  5 mg Oral BID    sodium chloride (NS) flush 5-40 mL  5-40 mL IntraVENous Q8H    sodium chloride (NS) flush 5-40 mL  5-40 mL IntraVENous PRN    acetaminophen (TYLENOL) tablet 650 mg  650 mg Oral Q6H PRN Or    acetaminophen (TYLENOL) suppository 650 mg  650 mg Rectal Q6H PRN    polyethylene glycol (MIRALAX) packet 17 g  17 g Oral DAILY PRN    ondansetron (ZOFRAN ODT) tablet 4 mg  4 mg Oral Q8H PRN    Or    ondansetron (ZOFRAN) injection 4 mg  4 mg IntraVENous Q6H PRN       Review of Systems:   A comprehensive review of systems was negative except for that written in the HPI. Objective:   Physical Exam:     Visit Vitals  /74   Pulse 68   Temp 97.7 °F (36.5 °C)   Resp 18   Ht 5' 4\" (1.626 m)   Wt 46 kg (101 lb 6.6 oz)   SpO2 91%   BMI 17.41 kg/m²    O2 Flow Rate (L/min): 2 l/min O2 Device: Nasal cannula    Temp (24hrs), Av.1 °F (36.7 °C), Min:97.7 °F (36.5 °C), Max:98.5 °F (36.9 °C)    No intake/output data recorded.  1901 -  0700  In: -   Out: 80 [Urine:410]    General:   Awake and alert   Lungs:   Clear to auscultation bilaterally. Chest wall:  No tenderness or deformity. Heart:  Regular rate and rhythm, S1, S2 normal, no murmur, click, rub or gallop. Abdomen:   Soft, non-tender. Bowel sounds normal. No masses,  No organomegaly. Extremities: Extremities normal, atraumatic, no cyanosis or edema. Pulses: 2+ and symmetric all extremities. Skin: Skin color, texture, turgor normal. No rashes or lesions   Neurologic: CNII-XII intact. No gross focal deficits         Data Review:       Recent Days:  Recent Labs     22  1324 22  0855   WBC 0.7* 0.5*   HGB 9.0* 8.8*   HCT 29.2* 28.4*    226     Recent Labs     22  1324      K 3.8      CO2 32   *   BUN 27*   CREA 0.71   CA 7.8*   MG 2.0     No results for input(s): PH, PCO2, PO2, HCO3, FIO2 in the last 72 hours.     24 Hour Results:  Recent Results (from the past 24 hour(s))   CBC W/O DIFF    Collection Time: 22  1:24 PM   Result Value Ref Range    WBC 0.7 (LL) 3.6 - 11.0 K/uL    RBC 2.90 (L) 3.80 - 5.20 M/uL    HGB 9.0 (L) 11.5 - 16.0 g/dL    HCT 29.2 (L) 35.0 - 47.0 %    .7 (H) 80.0 - 99.0 FL    MCH 31.0 26.0 - 34.0 PG    MCHC 30.8 30.0 - 36.5 g/dL    RDW 18.6 (H) 11.5 - 14.5 %    PLATELET 028 596 - 422 K/uL    MPV 10.9 8.9 - 12.9 FL    NRBC 0.0 0.0  WBC    ABSOLUTE NRBC 0.00 0.00 - 4.89 K/uL   METABOLIC PANEL, BASIC    Collection Time: 02/04/22  1:24 PM   Result Value Ref Range    Sodium 141 136 - 145 mmol/L    Potassium 3.8 3.5 - 5.1 mmol/L    Chloride 105 97 - 108 mmol/L    CO2 32 21 - 32 mmol/L    Anion gap 4 (L) 5 - 15 mmol/L    Glucose 135 (H) 65 - 100 mg/dL    BUN 27 (H) 6 - 20 mg/dL    Creatinine 0.71 0.55 - 1.02 mg/dL    BUN/Creatinine ratio 38 (H) 12 - 20      GFR est AA >60 >60 ml/min/1.73m2    GFR est non-AA >60 >60 ml/min/1.73m2    Calcium 7.8 (L) 8.5 - 10.1 mg/dL   MAGNESIUM    Collection Time: 02/04/22  1:24 PM   Result Value Ref Range    Magnesium 2.0 1.6 - 2.4 mg/dL       XR ABD (AP AND ERECT OR DECUB)   Final Result      XR CHEST PORT   Final Result   Comparison 1/29/2022. Interval development of mild pulmonary edema. CTA CHEST W OR W WO CONT   Final Result   Positive for pulmonary embolus, right lower lobe, acute versus subacute; the   report communicated to [facility], 1451 hours. Cardiomegaly with bilateral   pleural effusions. Atherosclerosis. Hiatus hernia with partially intrathoracic   stomach. Distended gallbladder with sludge. Moderate rectal distention by stool. Degenerative changes of the spine. Convex leftward scoliosis, centered at the   L1-L2 level. CT ABD PELV W CONT   Final Result   Positive for pulmonary embolus, right lower lobe, acute versus subacute; the   report communicated to [facility], 1451 hours. Cardiomegaly with bilateral   pleural effusions. Atherosclerosis. Hiatus hernia with partially intrathoracic   stomach. Distended gallbladder with sludge. Moderate rectal distention by stool. Degenerative changes of the spine. Convex leftward scoliosis, centered at the   L1-L2 level.       XR CHEST PORT   Final Result   Comparison 6/22/2020. Central line position as above. Right hemithorax pleural fluid/pleural thickening. Additional chronic findings   as above. 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 On Eliquis    Peptic ulcer disease with recent large duodenal bulb ulcer      Plan:    Continue supportive care  Hematology following, was given Granix yesterday  Continue to follow [address] discussed with: Patient/Family    Disposition: Possible SNF, awaiting some improvement in WBC      Total time spent with patient: 30 minutes.     Precious Higgins MD

## 2022-02-05 NOTE — PROGRESS NOTES
Infectious Diseases Progress Note  Alex Benz MD  [phone]    Date:2022       Room:[facility]  Patient Chu Curran     YOB: 1940     Age:81 y.o. 81F with past medical history of hypertension hyperlipidemia CVA. Reportedly positive for coronavirus the first week of 2021.     22 presents to [facility] with altered mental status. During the ED course was found to have an infiltrate on chest imaging, and started on broad spectrum antibiotics. Admitted to hospital and improved during the hospital course with meropenem. Hospital course further complicated by pulmonary embolism. 22 discharged from hospital on oral anticoagulation.     22 returns to hospital for acute hypoxic respiratory failure. CT reveals new development of Cavitary lesion of the inferior lateral right middle lobe. Admitted to hospital and I have been asked to see her in consultation.     1/10/22 underwent EGD with Dr Cristine Zayas:   Large duodenal bulb ulcer - injected and cauterized  Hiatal hernia  Recommendations:   PPI continuous infusion  NPO, IVF     For the cavitary pneumonia I had her on empiric meropenem with an anticipated stop date of 22.  22 discharged from [facility]     22 presents to [facility] because of lower abdominal pains. Reports constipation for the past few days. No improvement with enemas at home. Admitted to hospital for stabilization and further workup of her condition. Subjective    Subjective:  Symptoms:  Stable. Review of Systems   All other systems reviewed and are negative.     Objective         Vitals Last 24 Hours:  TEMPERATURE:  Temp  Av °F (36.7 °C)  Min: 97.7 °F (36.5 °C)  Max: 98.5 °F (36.9 °C)  RESPIRATIONS RANGE: Resp  Av.8  Min: 16  Max: 18  PULSE OXIMETRY RANGE: SpO2  Av.6 %  Min: 90 %  Max: 100 %  PULSE RANGE: Pulse  Av.5  Min: 56  Max: 75  BLOOD PRESSURE RANGE: Systolic (35BBW), FQT:028 , Min:97 , JTS:644   ; Diastolic (24hrs), Av, Min:61, Max:74    I/O (24Hr): Intake/Output Summary (Last 24 hours) at 2022 1326  Last data filed at 2022 0649  Gross per 24 hour   Intake    Output 410 ml   Net -410 ml     Objective:  General Appearance:  Comfortable. Vital signs: (most recent): Blood pressure 112/69, pulse 66, temperature 97.7 °F (36.5 °C), resp. rate 16, height 5' 4\" (1.626 m), weight 46 kg (101 lb 6.6 oz), SpO2 100 %. Vital signs are normal.    HEENT: Normal HEENT exam.    Lungs:  Normal effort and normal respiratory rate. Heart: Normal rate. Abdomen: Abdomen is soft. Neurological: Patient is alert. Skin:  Warm and dry. Labs/Imaging/Diagnostics    Labs:  CBC:  Recent Labs     22  1324 22  0855   WBC 0.7* 0.5*   RBC 2.90* 2.83*   HGB 9.0* 8.8*   HCT 29.2* 28.4*   .7* 100.4*   RDW 18.6* 19.0*    226     CHEMISTRIES:  Recent Labs     22  1038 22  1324    141   K 4.1 3.8    105   CO2 28 32   BUN 27* 27*   CA 7.8* 7.8*   MG  --  2.0   PT/INR:  No results for input(s): INR, INREXT, INREXT in the last 72 hours. No lab exists for component: PROTIME  APTT:  No results for input(s): APTT in the last 72 hours. LIVER PROFILE:  No results for input(s): AST, ALT in the last 72 hours. No lab exists for component: MORRIS Cee  Lab Results   Component Value Date/Time    ALT (SGPT) 73 2022 07:21 AM    AST (SGOT) 57 (H) 2022 07:21 AM    Alk. phosphatase 97 2022 07:21 AM    Bilirubin, total 2.0 (H) 2022 07:21 AM       Imaging Last 24 Hours:  No results found.   Assessment//Plan   Principal Problem:    Acute respiratory failure with hypoxia ([address] (2022)    Active Problems:    Pulmonary emboli ([address] (2022)      Lung abscess ([address] (2022)      Deep vein thrombosis (DVT) of lower extremity ([address] (2022)      Atrial fibrillation ([address] (2022)      Constipation (2022)      GIB (gastrointestinal bleeding) (1/29/2022)      Overview: Recent      Chronic gastric ulcer (1/29/2022)      Overview: Recent      COVID-19 (1/29/2022)      Overview: Dec 2021      Anemia (1/29/2022)      Hypokalemia (1/29/2022)      Bilateral pleural effusion (1/29/2022)      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 Reportedly positive for coronavirus the first week of december 2021.     1/2/22 presents to [facility] with altered mental status. During the ED course was found to have an infiltrate on chest imaging, and started on broad spectrum antibiotics. Admitted to hospital and improved during the hospital course with meropenem. Hospital course further complicated by pulmonary embolism. 1/6/22 discharged from hospital on oral anticoagulation.     1/7/22 returns to hospital for acute hypoxic respiratory failure. CT reveals new development of Cavitary lesion of the inferior lateral right middle lobe. Admitted to hospital and I have been asked to see her in consultation.     1/10/22 underwent EGD with Dr Jesus Manuel Sahu:   Large duodenal bulb ulcer - injected and cauterized  Hiatal hernia  Recommendations:   PPI continuous infusion  NPO, IVF     For the cavitary pneumonia I had her on empiric meropenem with an anticipated stop date of 2/4/22.  1/24/22 discharged from [facility]     1/29/22 presents to [facility] because of lower abdominal pains. Reports constipation for the past few days. No improvement with enemas at home.  Admitted to hospital for stabilization and further workup of her condition.     MICROBIOLOGY     1/2/22              Covid 19          Positive  1/2/22              Blood               Negative  1/3/22              Urine                Negative  1/7/22              Blood               Negative    1/29/22            Covid 19 Negative  1/29/22            Blood               Negative     ASSESSMENT AND RECOMMENDATIONS     1) Pneumonia with development in the setting of SARS-CoV-2 coronaviral respiratory syndrome. Further complicated by new development of right sided cavitary lesion, improved with a long course of meropenem.                 Repeat CT chest shows improvement in her pulmonary condition              Meropenem iv completed 2/1/22    2) Development of neutropenia during this hospital stay.      No objection to [employer] signed by Scotty Morales MD on 2/5/2022 at 12:17 PM

## 2022-02-05 NOTE — PROGRESS NOTES
CARDIOLOGY PROGRESS NOTE - NP    Patient seen and examined. This is a patient who is followed for atrial fibrillation with RVR. She is seen this morning lying comfortably in bed. Daughter at bedside. She is asymptomatic with atrial fibrillation despite RVR. Denies chest pain or dyspnea. No palpitations or dizziness. No nausea or abdominal pain. No other complaints reported. Telemetry reviewed, there were no events noted in the past 24 hours. Remains in atrial fibrillation with RVR. Rate ranging  but appears sustained in the 110s-120s. Pertinent review of systems items noted above, all other systems are negative. Current medications reviewed. Physical Examination  Vital signs are stable. Blood pressure 114/74, Pulse 68  No apparent distress. Heart is tachycardic and irregular. Normal S1, S2, no murmurs are appreciated. Lungs are clear but diminished bilaterally. Abdomen is soft, nontender, normal bowel sounds. Extremities have no edema. Labs reviewed. Case discussed with Dr. Daren Rosales and our impression and recommendations are as follows:  1. Atrial fibrillation:  Remains in atrial fibrillation with RVR 110s-120s despite PO amiodarone. Hypotension does not permit BB/CCB. Rate remained difficult to control during recent admission at [facility] even with use of amio gtt. Continue to treat underlying drivers of tachycardia. Continue Eliquis as well. Hematology on board. No active bleeding. ASHUTOSH/DCCV may be high risk due to anemia. Will consider should hematology agree.        2. History of PE  - continue Eliquis. Hgb stable. No active bleeding. Monitoring closely. Please do not hesitate to call me or Dr. Daren Rosales if additional questions arise.

## 2022-02-05 NOTE — WOUND CARE
IP WOUND CONSULT    [address] RECORD NUMBER:  [id number]  AGE: 79 y.o. GENDER: female  : 1940  TODAY'S DATE:  2022    GENERAL     [] Follow-up   [x] New Consult    Navya Mcneal is a 79 y.o. female referred by:   [x] Physician  [] Nursing  [] Other:         PAST MEDICAL HISTORY    History reviewed. No pertinent past medical history. PAST SURGICAL HISTORY    Past Surgical History:   Procedure Laterality Date    [facility] EGD DELIVER THERMAL ENERGY SPHNCTR/CARDIA GERD         FAMILY HISTORY    Family History   Family history unknown: Yes         ALLERGIES    Allergies   Allergen Reactions    Penicillins Other (comments)       MEDICATIONS    No current facility-administered medications on file prior to encounter. No current outpatient medications on file prior to encounter.          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 but unable to visualize wound bed   Dressing change: Yes, see flow chart  Verbal consent for picture: Yes per son    Contributing Factors: anticoagulation therapy, decreased mobility, shear force, incontinence of urine and malnutrition    Wound Coccyx Partial Thickness 22 (Active)   Wound Image   22 1156   Wound Etiology Pressure Stage 2 22 1156   Dressing Status New dressing applied 22 1156   Cleansed Cleansed with saline 22 1156   Dressing/Treatment Foam;Honey gel/honey paste 22 1156   Dressing Change Due 22 1156   Wound Length (cm) 1.8 cm 22 1156   Wound Width (cm) 0.9 cm 22 1156   Wound Depth (cm) 0.2 cm 22 1156   Wound Surface Area (cm^2) 1.62 cm^2 22 1156   Wound Volume (cm^3) 0.324 cm^3 22 1156   Wound Assessment Pink/red 22 1156   Drainage Amount None 22 1156 Wound Odor None 02/05/22 1156   Maria G-Wound/Incision Assessment Blanchable erythema;Dry/flaky 02/05/22 1156   Edges Unattached edges 02/05/22 1156   Wound Thickness Description Partial thickness 02/05/22 1156   Number of days: 0          PLAN     Skin Care & Pressure Relief Recommendations  Minimize layers of linen  Turn/reposition approximately every 2 hours  Pillow wedges  Manage incontinence   Promote continence; Skin Protective lotion/cream to buttocks and sacrum daily and as needed with incontinence care  Offload heels pillows    [address]  Blood Glucose: 135 on 2/4/22                             Albumin: 1.7 on 2/1/22  WBCs: 0.7 on 2/4/22    Support Surface: Gel mattress    Physician/Provider notified:   Recommendations: Unable to completely remove zinc paste from wound to coccyx to adequately visualize wound bed. Stage 2 PI noted with friction/shear injury, possibly stage 3 PI, will follow up to re-evaluate again. Wash area gently with soap and water and apply Therahoney gel daily, see dressing order. Avoid applying briefs. Maintain the PureWick to manage  incontinence. Use foam wedge to turn q2h at 30 degree angle or more to offload sacrum. Float heels with 2-3 pillows while in bed for offloading of the heels. Maintain a pillow between BLEs when laying on sides to prevent skin-to-skin pressure injury. Maintain HOB at 30 degrees or less, if not contraindicated, to reduce pressure to buttocks and sacrum. Raise foot of bed to help prevent friction and shear injury from sliding down in the bed. Son present during examination. He did not have questions for me at this time. Will continue to follow.          Discharge Wound Care Needs: TBD    Teaching completed with:   [x] Patient           [x] Family member       [] Caregiver          [] Nursing  [] Other    Patient/Caregiver Teaching: Educated patient and son that patient needs to turn q2h at 30 degree angle or more to offload sacrum and buttocks to allow for tissue perfusion and to promote wound healing.       Level of patient/caregiver understanding able to:   [] Indicates understanding       [x] Needs reinforcement  [] Unsuccessful      [] Verbal Understanding  [] Demonstrated understanding       [] No evidence of learning  [] Refused teaching         [] N/A       Electronically signed by Peace Koehler RN on 2/5/2022 at 12:03 PM

## 2022-02-05 NOTE — PROGRESS NOTES
Pulmonary/CC Progress Note  Elderly patient with history of extensive tobacco abuse, COPD recently treated for COVID-19 pneumonia in December 2021. Recently she was hospitalized with right middle lobe abscess and had been on IV meropenem.   CT scan showed subacute thrombus    Subjective:   Daily Progress Note: 2/5/2022 4:16 PM    Patient seen and examined  Overnight events noted    Lying in bed comfortably  Awake and alert  No acute distress  Occasional morning saying she has to go to the bathroom  Family at bedside  On 2 L nasal cannula oxygen    Current Facility-Administered Medications   Medication Dose Route Frequency    tbo-filgrastim (GRANIX) injection 300 mcg  300 mcg SubCUTAneous DAILY    folic acid (FOLVITE) tablet 1 mg  1 mg Oral DAILY    guaiFENesin ER (MUCINEX) tablet 600 mg  600 mg Oral Q12H    albuterol-ipratropium (DUO-NEB) 2.5 MG-0.5 MG/3 ML  3 mL Nebulization Q6HWA RT    polyethylene glycol (MIRALAX) packet 17 g  17 g Oral DAILY    amiodarone (CORDARONE) tablet 200 mg  200 mg Oral DAILY    [Held by provider] pantoprazole (PROTONIX) tablet 40 mg  40 mg Oral BID    midodrine (PROAMATINE) tablet 5 mg  5 mg Oral BID WITH MEALS    apixaban (ELIQUIS) tablet 5 mg  5 mg Oral BID    sodium chloride (NS) flush 5-40 mL  5-40 mL IntraVENous Q8H    sodium chloride (NS) flush 5-40 mL  5-40 mL IntraVENous PRN    acetaminophen (TYLENOL) tablet 650 mg  650 mg Oral Q6H PRN    Or    acetaminophen (TYLENOL) suppository 650 mg  650 mg Rectal Q6H PRN    polyethylene glycol (MIRALAX) packet 17 g  17 g Oral DAILY PRN    ondansetron (ZOFRAN ODT) tablet 4 mg  4 mg Oral Q8H PRN    Or    ondansetron (ZOFRAN) injection 4 mg  4 mg IntraVENous Q6H PRN        Review of Systems  Unchanged from initial consult    Objective:     Visit Vitals  /69   Pulse 66   Temp 97.7 °F (36.5 °C)   Resp 16   Ht 5' 4\" (1.626 m)   Wt 46 kg (101 lb 6.6 oz)   SpO2 100%   BMI 17.41 kg/m²    O2 Flow Rate (L/min): 2 l/min O2 Device: Nasal cannula    Temp (24hrs), Av °F (36.7 °C), Min:97.7 °F (36.5 °C), Max:98.5 °F (36.9 °C)      No intake/output data recorded.  1901 -  0700  In: -   Out: 410 [Urine:410]    HEENT: Normal HEENT exam.    Lungs:  Normal effort and normal respiratory rate. Heart: Normal rate. Abdomen: Abdomen is soft. Neurological: Patient is alert. Skin:  Warm and dry. Lab/Data Review:  Recent Labs     22  1324 22  0855   WBC 0.7* 0.5*   HGB 9.0* 8.8*   HCT 29.2* 28.4*    226     Recent Labs     22  1038 22  1324    141   K 4.1 3.8    105   CO2 28 32   * 135*   BUN 27* 27*   CREA 0.69 0.71   CA 7.8* 7.8*   MG  --  2.0     No results for input(s): PH, PCO2, PO2, HCO3, FIO2 in the last 72 hours. Diagnostics   CXR Results  (Last 48 hours)    None             Assessment/Plan:     Principal Problem:    Acute respiratory failure with hypoxia ([address] (2022)    Active Problems:    Pulmonary emboli ([address] (2022)      Lung abscess ([address] (2022)      Deep vein thrombosis (DVT) of lower extremity (HCC) (2022)      Atrial fibrillation ([address] (2022)      Constipation (2022)      GIB (gastrointestinal bleeding) (2022)      Overview: Recent      Chronic gastric ulcer (2022)      Overview: Recent      COVID-19 (2022)      Overview: Dec 2021      Anemia (2022)      Hypokalemia (2022)      Bilateral pleural effusion (2022)      Gallbladder anomaly (2022)      Hiatal hernia (2022)      Hypotension (2022)      Pleural effusion (2022)      1. Pneumonia. An 23-year-old frail  female with a history of very extensive tobacco abuse. She has underlying chronic obstructive pulmonary disease but does not appear to be in exacerbation. She had a recent hospitalization in [facility] a couple of times. In 2021, she had COVID-19 pneumonia.   More recently hospitalized with right middle lobe abscess. Patient underwent CT-guided drainage of abscess only 3 cc of foul-smelling fluid was drained after which she improved, WBC count came back to normal and she was discharged from the hospital on IV meropenem. .     Also venous thromboembolism and has been on Eliquis. Meropenem and Eliquis have been resumed. CTA shows subacute thrombus. There is no focal airspace disease. From pulmonary perspective patient can be discharged to home. I spoke to patient's daughter at bedside, patient has arrangement for hospital bed at home, she is also has arrangement for home health care and physical therapy. Continue with oxygen 2 L which she already has at home. Her oral intake has been poor and there is apparently some weight loss. 2.  Small to moderate bilateral pleural effusions. She appears to be third spacing. She also has dependent edema. Albumin is 1.7 only. Her EF is normal.  I believe that her effusions and third spacing is due to hypoalbuminemia and malnutrition. 3.  Atrial fibrillation and venous thromboembolism. Continue with Eliquis. Monitor hemoglobin. 4.  History of gastrointestinal bleed due to gastric ulcer, status post cauterization at [facility]. Continue with Protonix p.o. b.i.d. She also has gallbladder sludge. 5.  Constipation. Further management as per primary attending. Patient getting Fleet enema today. Magnesium sulfate can be tried as well  6. Enlarged thyroid and part of that appears to be retrosternal.  She also has mediastinal calcified lymphadenopathy. I believe this is all part and parcel of prior granulomatous disease. No acute problems. 7.  Leukopenia:  WBC count is only 0.5  Hematology following. Patient is off of antibiotics.     Questions of patient were answered at bedside in detail  Case discussed in detail with RN, RT, and care team  Thank you for involving me in the care of this patient  I will follow with you closely during hospitalization    Time spent more than 30 minutes under patient care with no overlap reviewing results and records, decision making, and answering questions.     Ajay Lieberman MD  Pulmonary/CC

## 2022-02-06 LAB
ANION GAP SERPL CALC-SCNC: 5 MMOL/L (ref 5–15)
BASOPHILS # BLD: 0 K/UL (ref 0–0.1)
BASOPHILS NFR BLD: 0 % (ref 0–1)
BLASTS NFR BLD MANUAL: 0 %
BUN SERPL-MCNC: 32 MG/DL (ref 6–20)
BUN/CREAT SERPL: 41 (ref 12–20)
CA-I BLD-MCNC: 8.1 MG/DL (ref 8.5–10.1)
CHLORIDE SERPL-SCNC: 105 MMOL/L (ref 97–108)
CO2 SERPL-SCNC: 31 MMOL/L (ref 21–32)
CREAT SERPL-MCNC: 0.79 MG/DL (ref 0.55–1.02)
DIFFERENTIAL METHOD BLD: ABNORMAL
EOSINOPHIL # BLD: 0 K/UL (ref 0–0.4)
EOSINOPHIL NFR BLD: 0 % (ref 0–7)
ERYTHROCYTE [DISTWIDTH] IN BLOOD BY AUTOMATED COUNT: 18.5 % (ref 11.5–14.5)
FERRITIN SERPL-MCNC: 1042 NG/ML (ref 8–252)
GLUCOSE SERPL-MCNC: 109 MG/DL (ref 65–100)
HCT VFR BLD AUTO: 26.8 % (ref 35–47)
HGB BLD-MCNC: 8.4 G/DL (ref 11.5–16)
IMM GRANULOCYTES # BLD AUTO: 0 K/UL
IMM GRANULOCYTES NFR BLD AUTO: 0 %
LYMPHOCYTES # BLD: 0.5 K/UL (ref 0.8–3.5)
LYMPHOCYTES NFR BLD: 37 % (ref 12–49)
MAGNESIUM SERPL-MCNC: 1.9 MG/DL (ref 1.6–2.4)
MANUAL DIFFERENTIAL PERFORMED BLD QL: ABNORMAL
MCH RBC QN AUTO: 30.8 PG (ref 26–34)
MCHC RBC AUTO-ENTMCNC: 31.3 G/DL (ref 30–36.5)
MCV RBC AUTO: 98.2 FL (ref 80–99)
METAMYELOCYTES NFR BLD MANUAL: 0 %
MONOCYTES # BLD: 0.9 K/UL (ref 0–1)
MONOCYTES NFR BLD: 62 % (ref 5–13)
MYELOCYTES NFR BLD MANUAL: 0 %
NEUTS BAND NFR BLD MANUAL: 0 % (ref 0–6)
NEUTS SEG # BLD: 0 K/UL (ref 1.8–8)
NEUTS SEG NFR BLD: 1 % (ref 32–75)
NRBC # BLD: 0.02 K/UL (ref 0–0.01)
NRBC BLD-RTO: 1.4 PER 100 WBC
OTHER CELLS NFR BLD MANUAL: 0 %
PLATELET # BLD AUTO: 238 K/UL (ref 150–400)
PMV BLD AUTO: 11.4 FL (ref 8.9–12.9)
POTASSIUM SERPL-SCNC: 3.6 MMOL/L (ref 3.5–5.1)
PROMYELOCYTES NFR BLD MANUAL: 0 %
RBC # BLD AUTO: 2.73 M/UL (ref 3.8–5.2)
RBC MORPH BLD: ABNORMAL
RBC MORPH BLD: ABNORMAL
SODIUM SERPL-SCNC: 141 MMOL/L (ref 136–145)
WBC # BLD AUTO: 1.4 K/UL (ref 3.6–11)

## 2022-02-06 PROCEDURE — 97535 SELF CARE MNGMENT TRAINING: CPT

## 2022-02-06 PROCEDURE — 82728 ASSAY OF FERRITIN: CPT

## 2022-02-06 PROCEDURE — 36415 COLL VENOUS BLD VENIPUNCTURE: CPT

## 2022-02-06 PROCEDURE — 77010033678 HC OXYGEN DAILY

## 2022-02-06 PROCEDURE — 97530 THERAPEUTIC ACTIVITIES: CPT

## 2022-02-06 PROCEDURE — 74011000250 HC RX REV CODE- 250: Performed by: INTERNAL MEDICINE

## 2022-02-06 PROCEDURE — 74011250637 HC RX REV CODE- 250/637: Performed by: NURSE PRACTITIONER

## 2022-02-06 PROCEDURE — 74011250637 HC RX REV CODE- 250/637: Performed by: INTERNAL MEDICINE

## 2022-02-06 PROCEDURE — 94760 N-INVAS EAR/PLS OXIMETRY 1: CPT

## 2022-02-06 PROCEDURE — 83735 ASSAY OF MAGNESIUM: CPT

## 2022-02-06 PROCEDURE — 65270000029 HC RM PRIVATE

## 2022-02-06 PROCEDURE — 80048 BASIC METABOLIC PNL TOTAL CA: CPT

## 2022-02-06 PROCEDURE — 94640 AIRWAY INHALATION TREATMENT: CPT

## 2022-02-06 PROCEDURE — 85027 COMPLETE CBC AUTOMATED: CPT

## 2022-02-06 PROCEDURE — 74011250637 HC RX REV CODE- 250/637: Performed by: STUDENT IN AN ORGANIZED HEALTH CARE EDUCATION/TRAINING PROGRAM

## 2022-02-06 PROCEDURE — 74011250636 HC RX REV CODE- 250/636: Performed by: INTERNAL MEDICINE

## 2022-02-06 RX ADMIN — SODIUM CHLORIDE, PRESERVATIVE FREE 10 ML: 5 INJECTION INTRAVENOUS at 14:00

## 2022-02-06 RX ADMIN — TBO-FILGRASTIM 300 MCG: 300 INJECTION, SOLUTION SUBCUTANEOUS at 09:00

## 2022-02-06 RX ADMIN — MIDODRINE HYDROCHLORIDE 5 MG: 5 TABLET ORAL at 16:02

## 2022-02-06 RX ADMIN — IPRATROPIUM BROMIDE AND ALBUTEROL SULFATE 3 ML: .5; 2.5 SOLUTION RESPIRATORY (INHALATION) at 14:44

## 2022-02-06 RX ADMIN — IPRATROPIUM BROMIDE AND ALBUTEROL SULFATE 3 ML: .5; 2.5 SOLUTION RESPIRATORY (INHALATION) at 07:51

## 2022-02-06 RX ADMIN — AMIODARONE HYDROCHLORIDE 200 MG: 200 TABLET ORAL at 09:40

## 2022-02-06 RX ADMIN — MIDODRINE HYDROCHLORIDE 5 MG: 5 TABLET ORAL at 09:40

## 2022-02-06 RX ADMIN — POLYETHYLENE GLYCOL 3350 17 G: 17 POWDER, FOR SOLUTION ORAL at 09:40

## 2022-02-06 RX ADMIN — APIXABAN 2.5 MG: 2.5 TABLET, FILM COATED ORAL at 21:44

## 2022-02-06 RX ADMIN — SODIUM CHLORIDE, PRESERVATIVE FREE 10 ML: 5 INJECTION INTRAVENOUS at 21:44

## 2022-02-06 RX ADMIN — APIXABAN 2.5 MG: 2.5 TABLET, FILM COATED ORAL at 09:40

## 2022-02-06 RX ADMIN — IPRATROPIUM BROMIDE AND ALBUTEROL SULFATE 3 ML: .5; 2.5 SOLUTION RESPIRATORY (INHALATION) at 19:43

## 2022-02-06 RX ADMIN — SODIUM CHLORIDE, PRESERVATIVE FREE 10 ML: 5 INJECTION INTRAVENOUS at 06:15

## 2022-02-06 RX ADMIN — FOLIC ACID 1 MG: 1 TABLET ORAL at 09:40

## 2022-02-06 RX ADMIN — GUAIFENESIN 600 MG: 600 TABLET, EXTENDED RELEASE ORAL at 09:40

## 2022-02-06 RX ADMIN — GUAIFENESIN 600 MG: 600 TABLET, EXTENDED RELEASE ORAL at 21:44

## 2022-02-06 NOTE — PROGRESS NOTES
Subjective   Subjective:      HPI :    Objective  Patient weak. denies fever,chills.     Current Facility-Administered Medications:     apixaban (ELIQUIS) tablet 2.5 mg, 2.5 mg, Oral, BID, Meera Green NP, 2.5 mg at 02/06/22 0940    tbo-filgrastim (GRANIX) injection 300 mcg, 300 mcg, SubCUTAneous, DAILY, Consuelo West MD, 300 mcg at 40/06/88 5190    folic acid (FOLVITE) tablet 1 mg, 1 mg, Oral, DAILY, Consuelo West MD, 1 mg at 02/06/22 0940    guaiFENesin ER (MUCINEX) tablet 600 mg, 600 mg, Oral, Q12H, Nalini Daniels MD, 600 mg at 02/06/22 0940    albuterol-ipratropium (DUO-NEB) 2.5 MG-0.5 MG/3 ML, 3 mL, Nebulization, Q6HWA RT, Michael Alaniz DO, 3 mL at 02/06/22 0751    polyethylene glycol (MIRALAX) packet 17 g, 17 g, Oral, DAILY, Reggie Loya MD, 17 g at 02/06/22 0940    amiodarone (CORDARONE) tablet 200 mg, 200 mg, Oral, DAILY, Kamaljit Robles MD, 200 mg at 02/06/22 0940    [Held by provider] pantoprazole (PROTONIX) tablet 40 mg, 40 mg, Oral, BID, Kamaljit Robles MD, 40 mg at 02/01/22 1017    midodrine (PROAMATINE) tablet 5 mg, 5 mg, Oral, BID WITH MEALS, Kamaljit Robles MD, 5 mg at 02/06/22 0940    sodium chloride (NS) flush 5-40 mL, 5-40 mL, IntraVENous, Q8H, Purvi Clinton MD, 10 mL at 02/06/22 0615    sodium chloride (NS) flush 5-40 mL, 5-40 mL, IntraVENous, PRN, Kamaljit Robles MD    acetaminophen (TYLENOL) tablet 650 mg, 650 mg, Oral, Q6H PRN, 650 mg at 02/03/22 0845 **OR** acetaminophen (TYLENOL) suppository 650 mg, 650 mg, Rectal, Q6H PRN, Purvi Keller MD    polyethylene glycol (MIRALAX) packet 17 g, 17 g, Oral, DAILY PRN, Kamaljit Robles MD    ondansetron (ZOFRAN ODT) tablet 4 mg, 4 mg, Oral, Q8H PRN **OR** ondansetron (ZOFRAN) injection 4 mg, 4 mg, IntraVENous, Q6H PRN, Kamaljit Robles MD    Objective:     Visit Vitals  BP 96/62   Pulse (!) 116   Temp 97.3 °F (36.3 °C)   Resp 18   Ht 5' 4\" (1.626 m)   Wt 46 kg (101 lb 6.6 oz)   SpO2 96%   BMI 17.41 kg/m²      Physical Exam   Lungs:CTA  HEART:RRR  ABD;NT,BS+  EXT:no edema    @Objective    [email or web address]     Data Review:   Recent Results (from the past 24 hour(s))   CBC WITH AUTOMATED DIFF    Collection Time: 02/05/22  8:33 PM   Result Value Ref Range    WBC 1.2 (L) 3.6 - 11.0 K/uL    RBC 2.96 (L) 3.80 - 5.20 M/uL    HGB 9.3 (L) 11.5 - 16.0 g/dL    HCT 29.9 (L) 35.0 - 47.0 %    .0 (H) 80.0 - 99.0 FL    MCH 31.4 26.0 - 34.0 PG    MCHC 31.1 30.0 - 36.5 g/dL    RDW 18.6 (H) 11.5 - 14.5 %    PLATELET 909 842 - 925 K/uL    MPV 10.9 8.9 - 12.9 FL    NRBC 0.0 0.0  WBC    ABSOLUTE NRBC 0.00 0.00 - 0.01 K/uL    NEUTROPHILS 2 (L) 32 - 75 %    LYMPHOCYTES 58 (H) 12 - 49 %    MONOCYTES 40 (H) 5 - 13 %    EOSINOPHILS 0 0 - 7 %    BASOPHILS 0 0 - 1 %    IMMATURE GRANULOCYTES 0 %    ABS. NEUTROPHILS 0.0 (L) 1.8 - 8.0 K/UL    ABS. LYMPHOCYTES 0.7 (L) 0.8 - 3.5 K/UL    ABS. MONOCYTES 0.5 0.0 - 1.0 K/UL    ABS. EOSINOPHILS 0.0 0.0 - 0.4 K/UL    ABS. BASOPHILS 0.0 0.0 - 0.1 K/UL    ABS. IMM. GRANS. 0.0 K/UL    DF Manual      RBC COMMENTS Marana cells  3+        RBC COMMENTS Microcytosis  2+           Leukopenia neutropenia:Severe. Most likely due to meropenem. R/o due to amiadarone  Less likely due to bone marrow pathology. Meropenem d/cd by ID. White count coming up. ANC 0.0. Check CBC. Will hold off on bone marrow biopsy. Continue granix. Neutropenic precautions. Pt afebrile. Risk of infection discussed with pt daughter. Normocytic hypochromic anemia: Patient iron studies normal .check ferritin . Hb improving. Constipation:Mangement per primary team.Better. D/w pt nurse.     Folate defficiency:Continue folic acid 1 mg every day. Pulmonary emboli  and DVT:continue eliquis. Monitor for bleeding closely. Lung abscess:S/p Meropenem. ID following. Better. D/w ID.  Harshad Rosas  H/o GI bleeding:no active bleeding clinically.               Plan:

## 2022-02-06 NOTE — PROGRESS NOTES
Hospitalist Progress Note               Daily Progress Note: 2/6/2022      Subjective:   Hospital course to date: Patient is an 71-year-old female with a very complex medical history in recent months. She was admitted for COVID-19 in December 2021 and then hospitalized at [facility] on 1/2/2022 with altered mental status. She was treated with meropenem. That hospital stay was complicated by pulmonary embolism. She was discharged on 1/6 on oral anticoagulation. On 1/7 admitted to [facility] for hypoxic respiratory failure. CT showed new development of cavitary lesion lateral right middle lobe. She was seen by ID and started back on IV Merrem with an anticipated stop date of 2/4. During that hospital stay she underwent EGD which showed a large duodenal bulb ulcer. She was discharged from [facility] on 1/24    Patient then presents to our facility on 1/29 with abdominal pain and constipation    Patient has been seen by multiple consultants this hospital stay including pulmonology, cardiology, ID and hematology, the latter for leukopenia and neutropenia.   There was concern that this was related to [facility] which has now been discontinued    ------    Patient seen and evaluated at bedside, of note currently has no new complaints, , discussed with RN    Problem List:  Problem List as of 2/6/2022 Date Reviewed: 1/29/2022          Codes Class Noted - Resolved    Pulmonary emboli ([facility] ICD-10-CM: I26.99  ICD-9-CM: 415.19  1/29/2022 - Present        * (Principal) Acute respiratory failure with hypoxia ([facility] ICD-10-CM: J96.01  ICD-9-CM: 518.81  1/29/2022 - Present        Lung abscess ([facility] ICD-10-CM: J85.2  ICD-9-CM: 513.0  1/29/2022 - Present        Deep vein thrombosis (DVT) of lower extremity ([facility] ICD-10-CM: I82.409  ICD-9-CM: 453.40  1/29/2022 - Present        Atrial fibrillation ([facility] ICD-10-CM: I48.91  ICD-9-CM: 427.31  1/29/2022 - Present        Constipation ICD-10-CM: K59.00  ICD-9-CM: 564.00  1/29/2022 - Present        GIB (gastrointestinal bleeding) (Chronic) ICD-10-CM: K92.2  ICD-9-CM: 578.9  1/29/2022 - Present    Overview Signed 1/29/2022  5:04 PM by Kamaljit Robles MD     Recent             Chronic gastric ulcer (Chronic) ICD-10-CM: K25.7  ICD-9-CM: 531.70  1/29/2022 - Present    Overview Signed 1/29/2022  5:05 PM by Kamaljit Robles MD     Recent             COVID-19 (Chronic) ICD-10-CM: U07.1  ICD-9-CM: 079.89  1/29/2022 - Present    Overview Signed 1/29/2022  5:05 PM by Kamaljit Robles MD     Dec 2021             Anemia ICD-10-CM: D64.9  ICD-9-CM: 285.9  1/29/2022 - Present        Hypokalemia ICD-10-CM: E87.6  ICD-9-CM: 276.8  1/29/2022 - Present        Bilateral pleural effusion ICD-10-CM: J90  ICD-9-CM: 511.9  1/29/2022 - Present        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  ICD-9-CM: 751.60  1/29/2022 - Present        Hiatal hernia ICD-10-CM: K44.9  ICD-9-CM: 553.3  1/29/2022 - Present        Hypotension ICD-10-CM: I95.9  ICD-9-CM: 458.9  1/29/2022 - Present        Pleural effusion ICD-10-CM: J90  ICD-9-CM: 511.9  1/29/2022 - Present              Medications reviewed  Current Facility-Administered Medications   Medication Dose Route Frequency    apixaban (ELIQUIS) tablet 2.5 mg  2.5 mg Oral BID    tbo-filgrastim (GRANIX) injection 300 mcg  300 mcg SubCUTAneous DAILY    folic acid (FOLVITE) tablet 1 mg  1 mg Oral DAILY    guaiFENesin ER (MUCINEX) tablet 600 mg  600 mg Oral Q12H    albuterol-ipratropium (DUO-NEB) 2.5 MG-0.5 MG/3 ML  3 mL Nebulization Q6HWA RT    polyethylene glycol (MIRALAX) packet 17 g  17 g Oral DAILY    amiodarone (CORDARONE) tablet 200 mg  200 mg Oral DAILY    [Held by provider] pantoprazole (PROTONIX) tablet 40 mg  40 mg Oral BID    midodrine (PROAMATINE) tablet 5 mg  5 mg Oral BID WITH MEALS    sodium chloride (NS) flush 5-40 mL  5-40 mL IntraVENous Q8H    sodium chloride (NS) flush 5-40 mL  5-40 mL IntraVENous PRN    acetaminophen (TYLENOL) tablet 650 mg  650 mg Oral Q6H PRN    Or    acetaminophen (TYLENOL) suppository 650 mg  650 mg Rectal Q6H PRN    polyethylene glycol (MIRALAX) packet 17 g  17 g Oral DAILY PRN    ondansetron (ZOFRAN ODT) tablet 4 mg  4 mg Oral Q8H PRN    Or    ondansetron (ZOFRAN) injection 4 mg  4 mg IntraVENous Q6H PRN       Review of Systems:   A comprehensive review of systems was negative except for that written in the HPI. Objective:   Physical Exam:     Visit Vitals  BP 96/62   Pulse (!) 116   Temp 97.3 °F (36.3 °C)   Resp 18   Ht 5' 4\" (1.626 m)   Wt 46 kg (101 lb 6.6 oz)   SpO2 96%   BMI 17.41 kg/m²    O2 Flow Rate (L/min): 2 l/min O2 Device: Nasal cannula    Temp (24hrs), Av.8 °F (36.6 °C), Min:97.3 °F (36.3 °C), Max:98.1 °F (36.7 °C)    No intake/output data recorded.  1901 -  0700  In: -   Out: 80 [Urine:410]    General:   Awake and alert   Lungs:   Clear to auscultation bilaterally. Chest wall:  No tenderness or deformity. Heart:  Regular rate and rhythm, S1, S2 normal, no murmur, click, rub or gallop. Abdomen:   Soft, non-tender. Bowel sounds normal. No masses,  No organomegaly. Extremities: Extremities normal, atraumatic, no cyanosis or edema. Pulses: 2+ and symmetric all extremities. Skin: Skin color, texture, turgor normal. No rashes or lesions   Neurologic: CNII-XII intact. No gross focal deficits         Data Review:       Recent Days:  Recent Labs     22  2033 22  1324   WBC 1.2* 0.7*   HGB 9.3* 9.0*   HCT 29.9* 29.2*    231     Recent Labs     22  1038 22  1324    141   K 4.1 3.8    105   CO2 28 32   * 135*   BUN 27* 27*   CREA 0.69 0.71   CA 7.8* 7.8*   MG 1.8 2.0     No results for input(s): PH, PCO2, PO2, HCO3, FIO2 in the last 72 hours.     24 Hour Results:  Recent Results (from the past 24 hour(s))   METABOLIC PANEL, BASIC    Collection Time: 22 10:38 AM   Result Value Ref Range    Sodium 138 136 - 145 mmol/L    Potassium 4.1 3.5 - 5.1 mmol/L Chloride 106 97 - 108 mmol/L    CO2 28 21 - 32 mmol/L    Anion gap 4 (L) 5 - 15 mmol/L    Glucose 105 (H) 65 - 100 mg/dL    BUN 27 (H) 6 - 20 mg/dL    Creatinine 0.69 0.55 - 1.02 mg/dL    BUN/Creatinine ratio 39 (H) 12 - 20      GFR est AA >60 >60 ml/min/1.73m2    GFR est non-AA >60 >60 ml/min/1.73m2    Calcium 7.8 (L) 8.5 - 10.1 mg/dL   MAGNESIUM    Collection Time: 02/05/22 10:38 AM   Result Value Ref Range    Magnesium 1.8 1.6 - 2.4 mg/dL   CBC WITH AUTOMATED DIFF    Collection Time: 02/05/22  8:33 PM   Result Value Ref Range    WBC 1.2 (L) 3.6 - 11.0 K/uL    RBC 2.96 (L) 3.80 - 5.20 M/uL    HGB 9.3 (L) 11.5 - 16.0 g/dL    HCT 29.9 (L) 35.0 - 47.0 %    .0 (H) 80.0 - 99.0 FL    MCH 31.4 26.0 - 34.0 PG    MCHC 31.1 30.0 - 36.5 g/dL    RDW 18.6 (H) 11.5 - 14.5 %    PLATELET 589 476 - 089 K/uL    MPV 10.9 8.9 - 12.9 FL    NRBC 0.0 0.0  WBC    ABSOLUTE NRBC 0.00 0.00 - 0.01 K/uL    NEUTROPHILS 2 (L) 32 - 75 %    LYMPHOCYTES 58 (H) 12 - 49 %    MONOCYTES 40 (H) 5 - 13 %    EOSINOPHILS 0 0 - 7 %    BASOPHILS 0 0 - 1 %    IMMATURE GRANULOCYTES 0 %    ABS. NEUTROPHILS 0.0 (L) 1.8 - 8.0 K/UL    ABS. LYMPHOCYTES 0.7 (L) 0.8 - 3.5 K/UL    ABS. MONOCYTES 0.5 0.0 - 1.0 K/UL    ABS. EOSINOPHILS 0.0 0.0 - 0.4 K/UL    ABS. BASOPHILS 0.0 0.0 - 0.1 K/UL    ABS. IMM. GRANS. 0.0 K/UL    DF Manual      RBC COMMENTS Ekta cells  3+        RBC COMMENTS Microcytosis  2+           XR ABD (AP AND ERECT OR DECUB)   Final Result      XR CHEST PORT   Final Result   Comparison 1/29/2022. Interval development of mild pulmonary edema. CTA CHEST W OR W WO CONT   Final Result   Positive for pulmonary embolus, right lower lobe, acute versus subacute; the   report communicated to [facility], 1451 hours. Cardiomegaly with bilateral   pleural effusions. Atherosclerosis. Hiatus hernia with partially intrathoracic   stomach. Distended gallbladder with sludge. Moderate rectal distention by stool. Degenerative changes of the spine. Convex leftward scoliosis, centered at the   L1-L2 level. CT ABD PELV W CONT   Final Result   Positive for pulmonary embolus, right lower lobe, acute versus subacute; the   report communicated to [facility], 1451 hours. Cardiomegaly with bilateral   pleural effusions. Atherosclerosis. Hiatus hernia with partially intrathoracic   stomach. Distended gallbladder with sludge. Moderate rectal distention by stool. Degenerative changes of the spine. Convex leftward scoliosis, centered at the   L1-L2 level. XR CHEST PORT   Final Result   Comparison 6/22/2020. Central line position as above. Right hemithorax pleural fluid/pleural thickening. Additional chronic findings   as above. 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 On Eliquis    Peptic ulcer disease with recent large duodenal bulb ulcer      Plan:    Continue supportive care  Hematology following, was given Granix yesterday  Continue to follow [address] discussed with: Patient/Family    Disposition: Possible SNF, awaiting some improvement in WBC      Total time spent with patient: 30 minutes.     Armond Tejeda MD

## 2022-02-06 NOTE — PROGRESS NOTES
Subjective   Subjective:      HPI :    Objective  Patient denies fever,chills.     Current Facility-Administered Medications:     tbo-filgrastim (GRANIX) injection 300 mcg, 300 mcg, SubCUTAneous, DAILY, Zhou Pop MD, 300 mcg at 07/61/57 9723    folic acid (FOLVITE) tablet 1 mg, 1 mg, Oral, DAILY, Zhou Pop MD, 1 mg at 02/05/22 0934    guaiFENesin ER (MUCINEX) tablet 600 mg, 600 mg, Oral, Q12H, Nalini Daniels MD, 600 mg at 02/05/22 0934    albuterol-ipratropium (DUO-NEB) 2.5 MG-0.5 MG/3 ML, 3 mL, Nebulization, Q6HWA RT, Michael Alaniz DO, 3 mL at 02/05/22 2042    polyethylene glycol (MIRALAX) packet 17 g, 17 g, Oral, DAILY, Reggie Loya MD, 17 g at 02/05/22 7601    amiodarone (CORDARONE) tablet 200 mg, 200 mg, Oral, DAILY, Jorge Luis Webster MD, 200 mg at 02/05/22 0934    [Held by provider] pantoprazole (PROTONIX) tablet 40 mg, 40 mg, Oral, BID, Jorge Luis Webster MD, 40 mg at 02/01/22 1017    midodrine (PROAMATINE) tablet 5 mg, 5 mg, Oral, BID WITH MEALS, Jorge Luis Webster MD, 5 mg at 02/05/22 1811    apixaban (ELIQUIS) tablet 5 mg, 5 mg, Oral, BID, Jorge Luis Webster MD, 5 mg at 02/05/22 0934    sodium chloride (NS) flush 5-40 mL, 5-40 mL, IntraVENous, Q8H, Teresa Clinton MD, 10 mL at 02/05/22 1400    sodium chloride (NS) flush 5-40 mL, 5-40 mL, IntraVENous, PRN, Jorge Luis Webster MD    acetaminophen (TYLENOL) tablet 650 mg, 650 mg, Oral, Q6H PRN, 650 mg at 02/03/22 0845 **OR** acetaminophen (TYLENOL) suppository 650 mg, 650 mg, Rectal, Q6H PRN, Sherrine Paganini, Teresa Chucky, MD    polyethylene glycol (MIRALAX) packet 17 g, 17 g, Oral, DAILY PRN, Jorge Luis Webster MD    ondansetron (ZOFRAN ODT) tablet 4 mg, 4 mg, Oral, Q8H PRN **OR** ondansetron (ZOFRAN) injection 4 mg, 4 mg, IntraVENous, Q6H PRN, Jorge Luis Webster MD    Objective:     Visit Vitals  /72   Pulse 92   Temp 98 °F (36.7 °C)   Resp 16   Ht 5' 4\" (1.626 m)   Wt 46 kg (101 lb 6.6 oz)   SpO2 98%   BMI 17.41 kg/m²      Physical Exam Lungs:CTA  HEART:RRR  ABD;NT,BS+  EXT:no edema    @Objective    [email or web address]     Data Review:   Recent Results (from the past 24 hour(s))   METABOLIC PANEL, BASIC    Collection Time: 02/05/22 10:38 AM   Result Value Ref Range    Sodium 138 136 - 145 mmol/L    Potassium 4.1 3.5 - 5.1 mmol/L    Chloride 106 97 - 108 mmol/L    CO2 28 21 - 32 mmol/L    Anion gap 4 (L) 5 - 15 mmol/L    Glucose 105 (H) 65 - 100 mg/dL    BUN 27 (H) 6 - 20 mg/dL    Creatinine 0.69 0.55 - 1.02 mg/dL    BUN/Creatinine ratio 39 (H) 12 - 20      GFR est AA >60 >60 ml/min/1.73m2    GFR est non-AA >60 >60 ml/min/1.73m2    Calcium 7.8 (L) 8.5 - 10.1 mg/dL   MAGNESIUM    Collection Time: 02/05/22 10:38 AM   Result Value Ref Range    Magnesium 1.8 1.6 - 2.4 mg/dL       Leukopenia neutropenia:Severe. Most likely due to meropenem. R/o due to amiadarone  Less likely due to bone marrow pathology. Meropenem d/cd by ID. White count still very low. ANC 0.0. If not better may need bone marrow biopsy. Other option supportive care due to pt general condition. Continue granix. Neutropenic precautions. Pt afebrile. Risk of infection discussed with pt daughter. Patient white count slightly better yesterday. Monitor. Check CBC today. Normocytic hypochromic anemia: Patient iron studies normal .check ferritin . Hb improving. Constipation:Mangement per primary team.Better. D/w pt nurse.     Folate defficiency:Continue folic acid 1 mg every day. Pulmonary emboli  and DVT:continue eliquis. Monitor for bleeding closely. Lung abscess:S/p Meropenem. ID following. Better. D/w ID.  Laisha Riojas  H/o GI bleeding:no active bleeding clinically.               Plan:

## 2022-02-06 NOTE — PROGRESS NOTES
Comprehensive Nutrition Assessment    Type and Reason for Visit: Reassess,NPO/clear liquid (Goal)    Nutrition Recommendations/Plan:   Continue diet, advance as medically appropriate. Consider advancing diet to Full Liquid/GI [place]. Continue ONSx6(Ensure Clear, Gelatein). Recommend ONS (Prosource) 60 mL/d. Monitor while Inpatient. Nutrition Assessment:  Admitted for ARF w/ hypoxia, abdominal pain, constipation despite high fiber foods and fluids, GI bleed s/p cauterization 2/2 chronic gastric ulcer. Continues on Clear Liquid diet w/ ONS x6/d- PO intake of 26-50%(improved) noted in I/Os most recently(2/3); unable to confirm amount as of 24 hrs. Per SLP on 2/4 Pt tolerated SBS6, single sips of Thin Liquids. No N/V/D reported, constipation managed w/ meds; consider advancing diet to Full Liquid/GI [place] given Clear Liquids x5 days is nutrient deficient, Pt has Underweight BMI, continued at risk for malnutrition and presence of St II PI. Labs: H/H 9.3/29.9, , , BUN 27, Ca 7. 8. Meds: B9, miralax, granix, midodrine, amiodarone, eliquis. Malnutrition Assessment:  Malnutrition Status: Moderate malnutrition    Context:  Acute illness     Findings of the 6 clinical characteristics of malnutrition:   Energy Intake:  7 - 50% or less of est energy requirements for 5 or more days  Weight Loss:  No significant weight loss     Body Fat Loss:  Unable to assess, Orbital,Buccal region   Muscle Mass Loss:  Unable to assess,    Fluid Accumulation:  No significant fluid accumulation,     Strength:  Not performed     Estimated Daily Nutrient Needs:  Energy (kcal): 1650 kcal/d (30 kcal/kg); Weight Used for Energy Requirements: Ideal  Protein (g): 66 gm/d (1.2 gm/kg); Weight Used for Protein Requirements: Ideal  Fluid (ml/day): 1650 mL/d; Method Used for Fluid Requirements: 1 ml/kcal    Nutrition Related Findings:   No NFPE completed at this time- Underweight BMI. No edema documented. No N/V/D/C reported.  SLP note on 2/4 reported Pt tolerated SBS6/Thin Liquids(single sips) w/ 1:1 feeding assistance. Pt remains Clear Liquids until advance by MD. Last BM on 2/6- soft. Wounds:    Stage II,Pressure injury (Coccyx)       Current Nutrition Therapies:  ADULT DIET Clear Liquid  ADULT ORAL NUTRITION SUPPLEMENT Breakfast, Lunch, Dinner; Clear Liquid  ADULT ORAL NUTRITION SUPPLEMENT Breakfast, Lunch, Dinner; Protein Modular    Anthropometric Measures:  · Height:  5' 4\" (162.6 cm)  · Current Body Wt:  46 kg (101 lb 6.6 oz)   · Admission Body Wt:  100 lb 1.4 oz    · Usual Body Wt:  47.6 kg (105 lb)     · Ideal Body Wt:  120 lbs:  84.5 %   · Adjusted Body Weight:   ; Weight Adjustment for: No adjustment   · Adjusted BMI:       · BMI Category:  Underweight (BMI less than 22) age over 72       Nutrition Diagnosis:   · Inadequate energy intake related to inadequate protein-energy intake as evidenced by NPO or clear liquid status due to medical condition,intake 26-50%,poor intake prior to admission,BMI    Nutrition Interventions:   Food and/or Nutrient Delivery: Continue current diet,Modify oral nutrition supplement,Continue oral nutrition supplement  Nutrition Education and Counseling: No recommendations at this time  Coordination of Nutrition Care: Continue to monitor while inpatient,Speech therapy,Feeding assistance/environmental change    Goals:  Meet >65% of EENs in 3-5 days. Gradual weight gain of 1-2 lbs/week. Improve labs/lytes to WNL. Improve skin integrity. Nutrition Monitoring and Evaluation:   Behavioral-Environmental Outcomes: None identified  Food/Nutrient Intake Outcomes: Diet advancement/tolerance,Food and nutrient intake,Supplement intake  Physical Signs/Symptoms Outcomes: Biochemical data,Chewing or swallowing,Constipation,Weight,Meal time behavior,Skin,GI status    Discharge Planning:     Too soon to determine     Electronically signed by Cher White RD on 2/6/2022 at 3:24 PM    Contact: ext. Ovidio Rodriguez or [employer].

## 2022-02-06 NOTE — PROGRESS NOTES
Pulmonary/CC Progress Note  Elderly patient with history of extensive tobacco abuse, COPD recently treated for COVID-19 pneumonia in December 2021. Recently she was hospitalized with right middle lobe abscess and had been on IV meropenem.   CT scan showed subacute thrombus    Subjective:   Daily Progress Note: 2/6/2022 4:16 PM    Patient seen and examined  Overnight events noted    Lying in bed comfortably  Awake and alert  No acute distress  Occasional morning saying she has to go to the bathroom  Family at bedside  On 2 L nasal cannula oxygen    Current Facility-Administered Medications   Medication Dose Route Frequency    apixaban (ELIQUIS) tablet 2.5 mg  2.5 mg Oral BID    tbo-filgrastim (GRANIX) injection 300 mcg  300 mcg SubCUTAneous DAILY    folic acid (FOLVITE) tablet 1 mg  1 mg Oral DAILY    guaiFENesin ER (MUCINEX) tablet 600 mg  600 mg Oral Q12H    albuterol-ipratropium (DUO-NEB) 2.5 MG-0.5 MG/3 ML  3 mL Nebulization Q6HWA RT    polyethylene glycol (MIRALAX) packet 17 g  17 g Oral DAILY    amiodarone (CORDARONE) tablet 200 mg  200 mg Oral DAILY    [Held by provider] pantoprazole (PROTONIX) tablet 40 mg  40 mg Oral BID    midodrine (PROAMATINE) tablet 5 mg  5 mg Oral BID WITH MEALS    sodium chloride (NS) flush 5-40 mL  5-40 mL IntraVENous Q8H    sodium chloride (NS) flush 5-40 mL  5-40 mL IntraVENous PRN    acetaminophen (TYLENOL) tablet 650 mg  650 mg Oral Q6H PRN    Or    acetaminophen (TYLENOL) suppository 650 mg  650 mg Rectal Q6H PRN    polyethylene glycol (MIRALAX) packet 17 g  17 g Oral DAILY PRN    ondansetron (ZOFRAN ODT) tablet 4 mg  4 mg Oral Q8H PRN    Or    ondansetron (ZOFRAN) injection 4 mg  4 mg IntraVENous Q6H PRN        Review of Systems  Unchanged from initial consult    Objective:     Visit Vitals  BP 91/60   Pulse (!) 106   Temp 97.6 °F (36.4 °C)   Resp 16   Ht 5' 4\" (1.626 m)   Wt 46 kg (101 lb 6.6 oz)   SpO2 96%   BMI 17.41 kg/m²    O2 Flow Rate (L/min): 2 l/min O2 Device: Nasal cannula    Temp (24hrs), Av.8 °F (36.6 °C), Min:97.3 °F (36.3 °C), Max:98.1 °F (36.7 °C)      No intake/output data recorded.  1901 -  0700  In: -   Out: 410 [Urine:410]    HEENT: Normal HEENT exam.    Lungs:  Normal effort and normal respiratory rate. Heart: Normal rate. Abdomen: Abdomen is soft. Neurological: Patient is alert. Skin:  Warm and dry. Lab/Data Review:  Recent Labs     22  1324   WBC 1.2* 0.7*   HGB 9.3* 9.0*   HCT 29.9* 29.2*    231     Recent Labs     22  1038 22  1324    141   K 4.1 3.8    105   CO2 28 32   * 135*   BUN 27* 27*   CREA 0.69 0.71   CA 7.8* 7.8*   MG 1.8 2.0     No results for input(s): PH, PCO2, PO2, HCO3, FIO2 in the last 72 hours. Diagnostics   CXR Results  (Last 48 hours)    None             Assessment/Plan:     Principal Problem:    Acute respiratory failure with hypoxia ([address] (2022)    Active Problems:    Pulmonary emboli ([address] (2022)      Lung abscess ([address] (2022)      Deep vein thrombosis (DVT) of lower extremity (HCC) (2022)      Atrial fibrillation ([address] (2022)      Constipation (2022)      GIB (gastrointestinal bleeding) (2022)      Overview: Recent      Chronic gastric ulcer (2022)      Overview: Recent      COVID-19 (2022)      Overview: Dec 2021      Anemia (2022)      Hypokalemia (2022)      Bilateral pleural effusion (2022)      Gallbladder anomaly (2022)      Hiatal hernia (2022)      Hypotension (2022)      Pleural effusion (2022)      1. Pneumonia. An 79-year-old frail  female with a history of very extensive tobacco abuse. She has underlying chronic obstructive pulmonary disease but does not appear to be in exacerbation. She had a recent hospitalization in [facility] a couple of times. In 2021, she had COVID-19 pneumonia.   More recently hospitalized with right middle lobe abscess. Patient underwent CT-guided drainage of abscess only 3 cc of foul-smelling fluid was drained after which she improved, WBC count came back to normal and she was discharged from the hospital on IV meropenem. Antibiotics for this have been discontinued. 2.  PE  On Eliquis  Continue with oxygen 2 L which she already has at home. Her oral intake has been poor and there is apparently some weight loss. 2.  Small to moderate bilateral pleural effusions. She appears to be third spacing. She also has dependent edema. Albumin is 1.7 only. Her EF is normal.  I believe that her effusions and third spacing is due to hypoalbuminemia and malnutrition. 3.  Atrial fibrillation and venous thromboembolism. Continue with Eliquis. Monitor hemoglobin. 4.  History of gastrointestinal bleed due to gastric ulcer, status post cauterization at [facility]. Continue with Protonix p.o. b.i.d. She also has gallbladder sludge. 5.  Constipation. Further management as per primary attending. Patient getting Fleet enema today. Magnesium sulfate can be tried as well  6. Enlarged thyroid and part of that appears to be retrosternal.  She also has mediastinal calcified lymphadenopathy. I believe this is all part and parcel of prior granulomatous disease. No acute problems. 7.  Leukopenia:  WBC count is only 0.5  Hematology following. Patient is off of antibiotics. Questions of patient were answered at bedside in detail  Case discussed in detail with RN, RT, and care team  Thank you for involving me in the care of this patient  I will follow with you closely during hospitalization    Time spent more than 30 minutes under patient care with no overlap reviewing results and records, decision making, and answering questions.     Sam Huffman MD  Pulmonary/CC

## 2022-02-06 NOTE — PROGRESS NOTES
CARDIOLOGY PROGRESS NOTE - NP     Patient seen and examined. This is a patient who is followed for atrial fibrillation with RVR. She is seen this morning lying comfortably in bed. Daughter is again at bedside. She is asymptomatic with atrial fibrillation despite RVR. Denies chest pain or dyspnea. No palpitations or dizziness. No nausea or abdominal pain. States that she is only tired this morning. No other complaints reported.     Telemetry reviewed, there were no events noted in the past 24 hours. Remains in atrial fibrillation with RVR sustained in the 110s mostly, occasionally 120s.    Pertinent review of systems items noted above, all other systems are negative. Current medications reviewed.     Physical Examination  Vital signs are stable. Blood pressure 111/65 Pulse 90  No apparent distress. Heart is tachycardic and irregular. Normal S1, S2, no murmurs are appreciated. Lungs are clear but diminished bilaterally. Abdomen is soft, nontender, normal bowel sounds. Extremities have no edema.     Labs reviewed.     Case discussed with Dr. Gordon Westbrook and our impression and recommendations are as follows:  1. Atrial fibrillation:  Remains in atrial fibrillation with RVR 110s mostly  despite PO amiodarone. Hypotension does not permit BB/CCB. Rate remained difficult to control during recent admission at [facility] even with use of amio gtt. Continue to treat underlying drivers of tachycardia. Continue Eliquis as well, however, given age and weight decreased dose to 2.5mg twice daily. Hematology on board. No active bleeding. ASHUTOSH/DCCV may high risk due to anemia. Will consider should hematology agree.        2. History of PE  - continue Eliquis at reduced dose. Hgb stable. No active bleeding. Monitoring closely.

## 2022-02-06 NOTE — PROGRESS NOTES
Infectious Diseases Progress Note  Alex Wetzel MD  [phone]    Date:2022       Room:[facility]  Patient Rachna Valenzuela     YOB: 1940     Age:81 y.o. 81F with past medical history of hypertension hyperlipidemia CVA. Reportedly positive for coronavirus the first week of 2021.     22 presents to [facility] with altered mental status. During the ED course was found to have an infiltrate on chest imaging, and started on broad spectrum antibiotics. Admitted to hospital and improved during the hospital course with meropenem. Hospital course further complicated by pulmonary embolism. 22 discharged from hospital on oral anticoagulation.     22 returns to hospital for acute hypoxic respiratory failure. CT reveals new development of Cavitary lesion of the inferior lateral right middle lobe. Admitted to hospital and I have been asked to see her in consultation.     1/10/22 underwent EGD with Dr Carolina Kelley:   Large duodenal bulb ulcer - injected and cauterized  Hiatal hernia  Recommendations:   PPI continuous infusion  NPO, IVF     For the cavitary pneumonia I had her on empiric meropenem with an anticipated stop date of 22.  22 discharged from [facility]     22 presents to [facility] because of lower abdominal pains. Reports constipation for the past few days. No improvement with enemas at home. Admitted to hospital for stabilization and further workup of her condition. Subjective    Subjective:  Symptoms:  Stable. Review of Systems   All other systems reviewed and are negative.     Objective         Vitals Last 24 Hours:  TEMPERATURE:  Temp  Av.8 °F (36.6 °C)  Min: 97.3 °F (36.3 °C)  Max: 98.1 °F (36.7 °C)  RESPIRATIONS RANGE: Resp  Av  Min: 16  Max: 18  PULSE OXIMETRY RANGE: SpO2  Av.8 %  Min: 92 %  Max: 100 %  PULSE RANGE: Pulse  Av  Min: 66  Max: 116  BLOOD PRESSURE RANGE: Systolic (31VWA), ZBV:246 , Min:91 , KVA:498   ; Diastolic (24hrs), Av, Min:60, Max:72    I/O (24Hr): No intake or output data in the 24 hours ending 22 1211  Objective:  General Appearance:  Comfortable. Vital signs: (most recent): Blood pressure 91/60, pulse (!) 106, temperature 97.6 °F (36.4 °C), resp. rate 16, height 5' 4\" (1.626 m), weight 46 kg (101 lb 6.6 oz), SpO2 96 %. Vital signs are normal.    HEENT: Normal HEENT exam.    Lungs:  Normal effort and normal respiratory rate. Heart: Normal rate. Abdomen: Abdomen is soft. Neurological: Patient is alert. Skin:  Warm and dry. Labs/Imaging/Diagnostics    Labs:  CBC:  Recent Labs     22  1324   WBC 1.2* 0.7*   RBC 2.96* 2.90*   HGB 9.3* 9.0*   HCT 29.9* 29.2*   .0* 100.7*   RDW 18.6* 18.6*    231     CHEMISTRIES:  Recent Labs     22  1038 22  1324    141   K 4.1 3.8    105   CO2 28 32   BUN 27* 27*   CA 7.8* 7.8*   MG 1.8 2.0   PT/INR:  No results for input(s): INR, INREXT, INREXT in the last 72 hours. No lab exists for component: PROTIME  APTT:  No results for input(s): APTT in the last 72 hours. LIVER PROFILE:  No results for input(s): AST, ALT in the last 72 hours. No lab exists for component: Eva Starch, ALKPHOS  Lab Results   Component Value Date/Time    ALT (SGPT) 73 2022 07:21 AM    AST (SGOT) 57 (H) 2022 07:21 AM    Alk. phosphatase 97 2022 07:21 AM    Bilirubin, total 2.0 (H) 2022 07:21 AM       Imaging Last 24 Hours:  No results found.   Assessment//Plan   Principal Problem:    Acute respiratory failure with hypoxia ([facility] (2022)    Active Problems:    Pulmonary emboli ([address] (2022)      Lung abscess ([address] (2022)      Deep vein thrombosis (DVT) of lower extremity (HCC) (2022)      Atrial fibrillation ([address] (2022)      Constipation (2022)      GIB (gastrointestinal bleeding) (2022)      Overview: Recent      Chronic gastric ulcer (2022)      Overview: Recent      COVID-19 (1/29/2022)      Overview: Dec 2021      Anemia (1/29/2022)      Hypokalemia (1/29/2022)      Bilateral pleural effusion (1/29/2022)      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 Reportedly positive for coronavirus the first week of december 2021.     1/2/22 presents to [facility] with altered mental status. During the ED course was found to have an infiltrate on chest imaging, and started on broad spectrum antibiotics. Admitted to hospital and improved during the hospital course with meropenem. Hospital course further complicated by pulmonary embolism. 1/6/22 discharged from hospital on oral anticoagulation.     1/7/22 returns to hospital for acute hypoxic respiratory failure. CT reveals new development of Cavitary lesion of the inferior lateral right middle lobe. Admitted to hospital and I have been asked to see her in consultation.     1/10/22 underwent EGD with Dr Lynette Uriostegui:   Large duodenal bulb ulcer - injected and cauterized  Hiatal hernia  Recommendations:   PPI continuous infusion  NPO, IVF     For the cavitary pneumonia I had her on empiric meropenem with an anticipated stop date of 2/4/22.  1/24/22 discharged from [facility]     1/29/22 presents to [facility] because of lower abdominal pains. Reports constipation for the past few days. No improvement with enemas at home.  Admitted to hospital for stabilization and further workup of her condition.     MICROBIOLOGY     1/2/22              Covid 19          Positive  1/2/22              Blood               Negative  1/3/22              Urine                Negative  1/7/22              Blood               Negative    1/29/22            Covid 19          Negative  1/29/22            Blood               Negative     ASSESSMENT AND RECOMMENDATIONS     1) Pneumonia with development in the setting of SARS-CoV-2 coronaviral respiratory syndrome. Further complicated by new development of right sided cavitary lesion, improved with a long course of meropenem.                 Repeat CT chest shows improvement in her pulmonary condition              Meropenem iv completed 2/1/22    2) Development of neutropenia during this hospital stay.      No objection to [employer] signed by John Nam MD on 2/6/2022 at 12:17 PM

## 2022-02-06 NOTE — PROGRESS NOTES
Problem: Pressure Injury - Risk of  Goal: *Prevention of pressure injury  Description: Document Zeyad Scale and appropriate interventions in the flowsheet. Outcome: Progressing Towards Goal  Note: Pressure Injury Interventions:  Sensory Interventions: Assess changes in LOC,Float heels,Minimize linen layers,Keep linens dry and wrinkle-free    Moisture Interventions: Absorbent underpads,Minimize layers    Activity Interventions: Pressure redistribution bed/mattress(bed type),Increase time out of bed,PT/OT evaluation    Mobility Interventions: Pressure redistribution bed/mattress (bed type),PT/OT evaluation    Nutrition Interventions: Offer support with meals,snacks and hydration    Friction and Shear Interventions: HOB 30 degrees or less,Minimize layers                Problem: Patient Education: Go to Patient Education Activity  Goal: Patient/Family Education  Outcome: Progressing Towards Goal     Problem: Falls - Risk of  Goal: *Absence of Falls  Description: Document Esther Fall Risk and appropriate interventions in the flowsheet.   Outcome: Progressing Towards Goal  Note: Fall Risk Interventions:  Mobility Interventions: Bed/chair exit alarm,Patient to call before getting OOB    Mentation Interventions: Bed/chair exit alarm    Medication Interventions: Bed/chair exit alarm,Patient to call before getting OOB    Elimination Interventions: Bed/chair exit alarm,Call light in reach,Patient to call for help with toileting needs              Problem: Patient Education: Go to Patient Education Activity  Goal: Patient/Family Education  Outcome: Progressing Towards Goal     Problem: Patient Education: Go to Patient Education Activity  Goal: Patient/Family Education  Outcome: Progressing Towards Goal

## 2022-02-06 NOTE — PROGRESS NOTES
Problem: Self Care Deficits Care Plan (Adult)  Goal: *Acute Goals and Plan of Care (Insert Text)  Description: Pt will be Mod I sup <> sit in prep for EOB ADLs  Pt will be Mod I grooming standing sink side LRAD  Pt will be Mod I LE dressing sitting EOB/long sit  Pt will be Mod I sit <>  prep for toileting LRAD  Pt will be Mod I toileting/toilet transfer/cloth mgmt LRAD  Pt will be IND following UE HEP in prep for self care tasks    Outcome: Progressing Towards Goal   OCCUPATIONAL THERAPY TREATMENT  Patient: Rodney Agosto (49 y.o. female)  Date: 2/6/2022  Diagnosis: Acute respiratory failure with hypoxia (HCC) [J96.01]  Pleural effusion [J90]  Lung abscess (HCC) [J85.2] Acute respiratory failure with hypoxia ([address]       Precautions: Fall  Chart, occupational therapy assessment, plan of care, and goals were reviewed. ASSESSMENT  Patient continues with skilled OT services and is progressing towards goals. Patient received semi supine in bed upon NG/PTA arrival , on 2L O2 via NC and agreeable to work with therapist.  Patient required min A x1 for bed mobility using bed rail, mod A x2 supine to sit EOB, min A x2 sit to supine and mod A x2 for scooting. Pt soiled self (bowel movement), required TA for toileting hygiene, new pure wick, barrier cream and omar pad. MOD A for UB dressing (new gown) in supine 2/2 decreased activity tolerance. Pt sat EOB for ~5 minutes with poor sitting balance needing mod A x1. Pt CGA /set-up for grooming (washed face). Pt started to lean side ways wanting to return to bed. Pt soiled self again and required TA for bowel movement, barrier cream and new omar pad. Pt yelling out at times but would not verbalize if she was in pain. Patient left resting in bed with call bell within reach, bed alarm set and all needs met.  Patient would benefit from continued skilled Occupational services while at [facility] in order to increase safety and independence with self care and functional tranfers/mobility. Recommend at discharge to SNF when medically appropriate. Current Level of Function Impacting Discharge (ADLs): TA for toileting, Mod A for UB dressing and CGA for grooming    Other factors to consider for discharge: . PLAN :  Patient continues to benefit from skilled intervention to address the above impairments. Continue treatment per established plan of care. to address goals. Recommend with staff: Bed mobility to relieve pressure sores     Recommend next OT session: BSC tf    Recommendation for discharge: (in order for the patient to meet his/her long term goals)  Krishan Driscoll    This discharge recommendation:  Has been made in collaboration with the attending provider and/or case management    IF patient discharges home will need the following DME: TBD       SUBJECTIVE:   Patient stated i'm cold.     OBJECTIVE DATA SUMMARY:   Cognitive/Behavioral Status:  Neurologic State: Alert  Orientation Level: Oriented to person  Cognition: Decreased attention/concentration;Decreased command following             Functional Mobility and Transfers for ADLs:  Bed Mobility:  Rolling: Minimum assistance;Assist x1  Supine to Sit: Moderate assistance;Assist x2  Sit to Supine: Minimum assistance;Assist x2  Scooting: Moderate assistance;Assist x2    Transfers:             Balance:  Sitting: Impaired; With support  Sitting - Static: Poor (constant support)  Sitting - Dynamic: Poor (constant support)    ADL Intervention:       Grooming  Grooming Assistance: Contact guard assistance;Set-up  Position Performed: Seated edge of bed  Washing Face: Contact guard assistance;Set-up              Upper Body Dressing Assistance  Dressing Assistance: Moderate assistance;Set-up  Hospital Gown: Moderate assistance;Set-up         Toileting  Toileting Assistance: Total assistance(dependent)  Bowel Hygiene:  Total assistance (dependent)             Pain:  Would not verbalize    Activity Tolerance: Poor and requires rest breaks  Please refer to the flowsheet for vital signs taken during this treatment. After treatment patient left in no apparent distress:   Supine in bed, Call bell within reach, Bed / chair alarm activated, and Side rails x 3    COMMUNICATION/COLLABORATION:   The patients plan of care was discussed with: Physical therapy assistant and Registered nurse.      HERNANDEZ Gomez  Time Calculation: 29 mins

## 2022-02-06 NOTE — PROGRESS NOTES
PHYSICAL THERAPY TREATMENT  Patient: Tanna Jackson (32 y.o. female)  Date: 2/6/2022  Diagnosis: Acute respiratory failure with hypoxia ([facility] [J96.01]  Pleural effusion [J90]  Lung abscess ([facility] [J85.2] Acute respiratory failure with hypoxia [facility])       Precautions: Fall  Chart, physical therapy assessment, plan of care and goals were reviewed. ASSESSMENT  Patient continues with skilled PT services and is progressing towards goals. Pt received semi supine in bed, on 2L O2 via NC and agreeable to work with therapist.  Noted pt in need of jose g care and omar pad change. Pt rolled side<>side with min A using bed rail. Pt then went from supine to sitting and scooted to EOB with mod A x 2. She sat on EOB for grooming/ADL task (see OT note for details) for ~ 5 min needing constant UE support and assistance to maintain balance, with LOB to L, pt wanting to return to bed. Pt went back to supine with min A x 2. Pt in need of jose g care again, soiled with feces, which was completed having pt repeat rolling side to side with min A. Pt was scooted to [facility] with mod A x 2. Pt was left resting in bed in NAD, call bell within reach, all needs addressed. Current Level of Function Impacting Discharge (ADLs): assist x 2, poor balance     Other factors to consider for discharge: severity of impairment, assist level, support available at home and home environment         PLAN :  Patient continues to benefit from skilled intervention to address the above impairments. Continue treatment per established plan of care. to address goals.     Recommendation for discharge: (in order for the patient to meet his/her long term goals)  Krishan Driscoll    This discharge recommendation:  Has been made in collaboration with the attending provider and/or case management    IF patient discharges home will need the following DME: to be determined (TBD)       SUBJECTIVE:   Patient not verbalizing c/o    OBJECTIVE DATA SUMMARY:   Critical Behavior:  Neurologic State: Alert  Orientation Level: Oriented to person  Cognition: Decreased attention/concentration,Decreased command following     Functional Mobility Training:  Bed Mobility:  Rolling: Minimum assistance;Assist x1  Supine to Sit: Moderate assistance;Assist x2  Sit to Supine: Minimum assistance;Assist x2  Scooting: Moderate assistance;Assist x2     Balance:  Sitting: Impaired; With support  Sitting - Static: Poor (constant support)  Sitting - Dynamic: Poor (constant support)    Pain Rating:  Unable to verbalize    Activity Tolerance:   Fair  Please refer to the flowsheet for vital signs taken during this treatment. After treatment patient left in no apparent distress:   Supine in bed, Call bell within reach, Bed / chair alarm activated, and Side rails x 3    COMMUNICATION/COLLABORATION:   The patients plan of care was discussed with: Occupational therapy assistant and Registered nurse.  Pt seen on co-Tx with NG for the safety of pt and clinicians, pt needing assist x 2    Eric Thomas PTA   Time Calculation: 29 mins

## 2022-02-07 LAB
ANION GAP SERPL CALC-SCNC: 7 MMOL/L (ref 5–15)
BASOPHILS # BLD: 0 K/UL (ref 0–0.1)
BASOPHILS NFR BLD: 0 % (ref 0–1)
BLASTS NFR BLD MANUAL: 0 %
BUN SERPL-MCNC: 36 MG/DL (ref 6–20)
BUN/CREAT SERPL: 42 (ref 12–20)
CA-I BLD-MCNC: 8 MG/DL (ref 8.5–10.1)
CHLORIDE SERPL-SCNC: 106 MMOL/L (ref 97–108)
CO2 SERPL-SCNC: 30 MMOL/L (ref 21–32)
CREAT SERPL-MCNC: 0.85 MG/DL (ref 0.55–1.02)
DIFFERENTIAL METHOD BLD: ABNORMAL
EOSINOPHIL # BLD: 0 K/UL (ref 0–0.4)
EOSINOPHIL NFR BLD: 0 % (ref 0–7)
ERYTHROCYTE [DISTWIDTH] IN BLOOD BY AUTOMATED COUNT: 18.5 % (ref 11.5–14.5)
GLUCOSE SERPL-MCNC: 89 MG/DL (ref 65–100)
HCT VFR BLD AUTO: 26.5 % (ref 35–47)
HGB BLD-MCNC: 8.3 G/DL (ref 11.5–16)
IMM GRANULOCYTES # BLD AUTO: 0 K/UL
IMM GRANULOCYTES NFR BLD AUTO: 0 %
LYMPHOCYTES # BLD: 0.8 K/UL (ref 0.8–3.5)
LYMPHOCYTES NFR BLD: 27 % (ref 12–49)
MANUAL DIFFERENTIAL PERFORMED BLD QL: ABNORMAL
MCH RBC QN AUTO: 31 PG (ref 26–34)
MCHC RBC AUTO-ENTMCNC: 31.3 G/DL (ref 30–36.5)
MCV RBC AUTO: 98.9 FL (ref 80–99)
METAMYELOCYTES NFR BLD MANUAL: 5 %
MONOCYTES # BLD: 0.8 K/UL (ref 0–1)
MONOCYTES NFR BLD: 28 % (ref 5–13)
MYELOCYTES NFR BLD MANUAL: 5 %
NEUTS BAND NFR BLD MANUAL: 5 % (ref 0–6)
NEUTS SEG # BLD: 1.1 K/UL (ref 1.8–8)
NEUTS SEG NFR BLD: 30 % (ref 32–75)
NRBC # BLD: 0.04 K/UL (ref 0–0.01)
NRBC BLD-RTO: 1.3 PER 100 WBC
OTHER CELLS NFR BLD MANUAL: 0 %
PLATELET # BLD AUTO: 240 K/UL (ref 150–400)
PMV BLD AUTO: 11.1 FL (ref 8.9–12.9)
POTASSIUM SERPL-SCNC: 3.4 MMOL/L (ref 3.5–5.1)
PROMYELOCYTES NFR BLD MANUAL: 0 %
RBC # BLD AUTO: 2.68 M/UL (ref 3.8–5.2)
RBC MORPH BLD: ABNORMAL
RBC MORPH BLD: ABNORMAL
SODIUM SERPL-SCNC: 143 MMOL/L (ref 136–145)
WBC # BLD AUTO: 3 K/UL (ref 3.6–11)

## 2022-02-07 PROCEDURE — 85027 COMPLETE CBC AUTOMATED: CPT

## 2022-02-07 PROCEDURE — 74011250637 HC RX REV CODE- 250/637: Performed by: INTERNAL MEDICINE

## 2022-02-07 PROCEDURE — 74011250636 HC RX REV CODE- 250/636: Performed by: INTERNAL MEDICINE

## 2022-02-07 PROCEDURE — 97530 THERAPEUTIC ACTIVITIES: CPT

## 2022-02-07 PROCEDURE — 94760 N-INVAS EAR/PLS OXIMETRY 1: CPT

## 2022-02-07 PROCEDURE — 94640 AIRWAY INHALATION TREATMENT: CPT

## 2022-02-07 PROCEDURE — 74011000250 HC RX REV CODE- 250: Performed by: INTERNAL MEDICINE

## 2022-02-07 PROCEDURE — 80048 BASIC METABOLIC PNL TOTAL CA: CPT

## 2022-02-07 PROCEDURE — 94761 N-INVAS EAR/PLS OXIMETRY MLT: CPT

## 2022-02-07 PROCEDURE — 77010033678 HC OXYGEN DAILY

## 2022-02-07 PROCEDURE — 83735 ASSAY OF MAGNESIUM: CPT

## 2022-02-07 PROCEDURE — 36415 COLL VENOUS BLD VENIPUNCTURE: CPT

## 2022-02-07 PROCEDURE — 65270000029 HC RM PRIVATE

## 2022-02-07 PROCEDURE — 74011250637 HC RX REV CODE- 250/637: Performed by: NURSE PRACTITIONER

## 2022-02-07 RX ADMIN — GUAIFENESIN 600 MG: 600 TABLET, EXTENDED RELEASE ORAL at 21:04

## 2022-02-07 RX ADMIN — FOLIC ACID 1 MG: 1 TABLET ORAL at 09:21

## 2022-02-07 RX ADMIN — IPRATROPIUM BROMIDE AND ALBUTEROL SULFATE 3 ML: .5; 2.5 SOLUTION RESPIRATORY (INHALATION) at 22:26

## 2022-02-07 RX ADMIN — SODIUM CHLORIDE, PRESERVATIVE FREE 10 ML: 5 INJECTION INTRAVENOUS at 06:06

## 2022-02-07 RX ADMIN — TBO-FILGRASTIM 300 MCG: 300 INJECTION, SOLUTION SUBCUTANEOUS at 09:21

## 2022-02-07 RX ADMIN — SODIUM CHLORIDE, PRESERVATIVE FREE 10 ML: 5 INJECTION INTRAVENOUS at 21:14

## 2022-02-07 RX ADMIN — MIDODRINE HYDROCHLORIDE 5 MG: 5 TABLET ORAL at 09:21

## 2022-02-07 RX ADMIN — MIDODRINE HYDROCHLORIDE 5 MG: 5 TABLET ORAL at 17:04

## 2022-02-07 RX ADMIN — APIXABAN 2.5 MG: 2.5 TABLET, FILM COATED ORAL at 09:21

## 2022-02-07 RX ADMIN — IPRATROPIUM BROMIDE AND ALBUTEROL SULFATE 3 ML: .5; 2.5 SOLUTION RESPIRATORY (INHALATION) at 14:15

## 2022-02-07 RX ADMIN — GUAIFENESIN 600 MG: 600 TABLET, EXTENDED RELEASE ORAL at 09:21

## 2022-02-07 RX ADMIN — AMIODARONE HYDROCHLORIDE 200 MG: 200 TABLET ORAL at 09:21

## 2022-02-07 RX ADMIN — IPRATROPIUM BROMIDE AND ALBUTEROL SULFATE 3 ML: .5; 2.5 SOLUTION RESPIRATORY (INHALATION) at 07:26

## 2022-02-07 RX ADMIN — APIXABAN 2.5 MG: 2.5 TABLET, FILM COATED ORAL at 21:04

## 2022-02-07 RX ADMIN — SODIUM CHLORIDE, PRESERVATIVE FREE 10 ML: 5 INJECTION INTRAVENOUS at 15:17

## 2022-02-07 NOTE — PROGRESS NOTES
Problem: Mobility Impaired (Adult and Pediatric)  Goal: *Acute Goals and Plan of Care (Insert Text)  Description: Pt will be I with LE HEP in 7 days. Pt will perform bed mobility with min Ax1 in 7 days. Pt will perform transfers with min Ax1 in 7 days. Pt will amb 25-50 feet with LRAD safely with min Ax1 in 7 days. Outcome: Progressing Towards Goal   PHYSICAL THERAPY TREATMENT  Patient: Otilio Cahudhary (5 y.o. female)  Date: 2/7/2022  Diagnosis: Acute respiratory failure with hypoxia ([address] [J96.01]  Pleural effusion [J90]  Lung abscess ([address] [J85.2] Acute respiratory failure with hypoxia [facility])       Precautions: Fall  Chart, physical therapy assessment, plan of care and goals were reviewed. ASSESSMENT  Patient continues with skilled PT services and is progressing towards goals. Pt received semi supine in bed, agreeable to Tx. Pt rolled L<>R with min A for jose g care and brief changing. She went from supine to sitting and scooted to EOB with mod A x 2. On EOB, pt displayed poor trunk control with no righting reactions, pt needing max A to maintain posture while completing face washing (see OT note). Pt transferred sit<>stand to [address] with mod A x 2, again with poor control needing mod A x 2 to maintain balance while side stepping 3 ft to chair using the FWW. Pt sat in chair with slouched posture and needed max A x 2 to scoot back in chair. Pt left sitting in chair in fully reclined position with daughter in room, call bell within reach, in NAD and all needs addressed. Pt seen today by PTA for treatment, discussed plan of care with supervising PT and goals reviewed and updated as appropriate. Plan of care reviewed and adjusted as necessary due to patient LOS, no change in patient functional status at this time, continue to progress towards goals as able.     Current Level of Function Impacting Discharge (mobility/balance): assist x 2, poor balance    Other factors to consider for discharge: family support, DME, time since onset, severity of deficits        PLAN :  Patient continues to benefit from skilled intervention to address the above impairments. Continue treatment per established plan of care. to address goals. Recommendation for discharge: (in order for the patient to meet his/her long term goals)  Krishan Driscoll    This discharge recommendation:  Has been made in collaboration with the attending provider and/or case management    IF patient discharges home will need the following DME: to be determined (TBD)       SUBJECTIVE:   Patient reciting months in the year when asked what the current month was    OBJECTIVE DATA SUMMARY:   Critical Behavior:  Neurologic State: Alert  Orientation Level: Disoriented to person  Cognition: No command following,Poor safety awareness,Decreased attention/concentration     Functional Mobility Training:  Bed Mobility:  Rolling: Minimum assistance  Supine to Sit: Moderate assistance;Assist x2  Sit to Supine: Moderate assistance  Scooting: Moderate assistance;Assist x2     Transfers:  Sit to Stand: Moderate assistance;Assist x2  Stand to Sit: Moderate assistance;Assist x2  Bed to Chair: Moderate assistance;Assist x2        Balance:  Sitting: Impaired; With support  Sitting - Static: Poor (constant support)  Sitting - Dynamic: Poor (constant support)  Standing: Impaired; With support  Standing - Static: Poor  Standing - Dynamic : Poor     Ambulation/Gait Training:  Distance (ft): 3 Feet (ft)  Assistive Device: Walker, rolling;Gait belt  Ambulation - Level of Assistance: Maximum assistance; Moderate assistance;Assist x2    Activity Tolerance:   Fair  Please refer to the flowsheet for vital signs taken during this treatment.     After treatment patient left in no apparent distress:   Sitting in chair, Heels elevated for pressure relief, Call bell within reach and Caregiver / family present    COMMUNICATION/COLLABORATION:   The patients plan of care was discussed with: Occupational therapy assistant and Registered nurse Pt seen on co-Tx with NG for the safety of pt and clinicians, pt needing assist x 2.     Gigi Bojorquez, PTA

## 2022-02-07 NOTE — PROGRESS NOTES
OCCUPATIONAL THERAPY TREATMENT  Patient: Katie Fraga (88 y.o. female)  Date: 2/7/2022  Diagnosis: Acute respiratory failure with hypoxia ([facility] [address] [J96.01]  Pleural effusion [J90]  Lung abscess ([facility] [address] [J85.2] Acute respiratory failure with hypoxia [facility])       Precautions: Fall  Chart, occupational therapy assessment, plan of care, and goals were reviewed. ASSESSMENT  Patient continues with skilled OT services and is progressing towards goals. Upon NG/PTA arrival, pt semi supine in bed and agreeable to tx session. Donning of clean brief completed with MaxA while in supine. Pt completed bed mobility with Nik rolling, ModA x2 supine>sit, and ModA x2 scooting to EOB. Pt noted to have poor trunk and postural control throughout session and required MaxA for balance at EOB and in standing. Pt completed sit>stand with ModA x2 and completed bed>chair with ModA x2. Pt required MaxA x2 scooting to back of chair. Pt noted to be more alert and able to answer questions this session. Pt noted to be falling all over during mobility and required max cueing throughout mobility. Pt noted with nose bleed during session and required Min/ModA for face washing. Pt left sitting in chair with daughter in room. Will continue to follow pt throughout remainder of stay and progress towards OT goals. Recommending SNF at discharge when medically appropriate. Patient seen today by Lani Wiggins for treatment, discussed plan of care with supervising OT and goals reviewed and updated as appropriate. Plan of care reviewed and adjusted as necessary due to patient LOS, no change in patient functional status at this time, continue to progress towards goals as able. Other factors to consider for discharge: family support, DME, time since onset, severity of deficits          PLAN :  Patient continues to benefit from skilled intervention to address the above impairments. Continue treatment per established plan of care.   to address goals. Recommend next OT session: seated EOB grooming    Recommendation for discharge: (in order for the patient to meet his/her long term goals)  Krishan Driscoll    This discharge recommendation:  Has been made in collaboration with the attending provider and/or case management    IF patient discharges home will need the following DME: TBD       SUBJECTIVE:   Patient stated \"January, [place] Fell when asked what month it is. OBJECTIVE DATA SUMMARY:   Cognitive/Behavioral Status:  Neurologic State: Alert  Orientation Level: Disoriented to person  Cognition: No command following;Poor safety awareness;Decreased attention/concentration    Functional Mobility and Transfers for ADLs:  Bed Mobility:  Rolling: Minimum assistance  Supine to Sit: Moderate assistance;Assist x2  Scooting: Moderate assistance;Assist x2    Transfers:  Sit to Stand: Moderate assistance;Assist x2     Bed to Chair: Moderate assistance;Assist x2    Balance:  Sitting: Impaired; With support  Sitting - Static: Poor (constant support)  Sitting - Dynamic: Poor (constant support)  Standing: Impaired; With support  Standing - Static: Constant support;Poor  Standing - Dynamic : Constant support;Poor    ADL Intervention:  Grooming  Position Performed: Seated edge of bed  Washing Face: Minimum assistance; Moderate assistance    Lower Body Dressing Assistance  Protective Undergarmet: Maximum assistance    Pain:  No pain reported    Activity Tolerance:   Poor and requires rest breaks  Please refer to the flowsheet for vital signs taken during this treatment. After treatment patient left in no apparent distress:   Sitting in chair, Call bell within reach, and Caregiver / family present    COMMUNICATION/COLLABORATION:   The patients plan of care was discussed with: Physical therapy assistant and Registered nurse. [place] with PT for increased safety for pt and clinician.     HERNANDEZ Solano  Time Calculation: 25 mins    Problem: Self Care Deficits Care Plan (Adult)  Goal: *Acute Goals and Plan of Care (Insert Text)  Description: Pt will be Mod I sup <> sit in prep for EOB ADLs  Pt will be Mod I grooming standing sink side LRAD  Pt will be Mod I LE dressing sitting EOB/long sit  Pt will be Mod I sit <>  prep for toileting LRAD  Pt will be Mod I toileting/toilet transfer/cloth mgmt LRAD  Pt will be IND following UE HEP in prep for self care tasks    Outcome: Progressing Towards Goal

## 2022-02-07 NOTE — PROGRESS NOTES
Pulmonary/CC Progress Note  Elderly patient with history of extensive tobacco abuse, COPD recently treated for COVID-19 pneumonia in December 2021. Recently she was hospitalized with right middle lobe abscess and had been on IV meropenem. CT scan showed subacute thrombus    Subjective:   Daily Progress Note: 2/7/2022     Patient seen and examined in her room on the floor this morning, no acute events overnight. I discussed the case in detail with the patient's daughter at the bedside, see saturating well on 2 to 3 L nasal cannula. Apparently she is on 2 L nasal cannula at home after recent discharge from [facility], we can try to wean this off as an outpatient. She has good pulmonary follow-up with PFTs/COPD management as well as repeat imaging as an outpatient to monitor her pulmonary abscess. No labs today, but she is currently on Granix to assist with white blood cell recovery. She is on Eliquis twice daily as well for known blood VTE. From a pulmonary standpoint, the patient can be discharged whenever felt appropriate by the primary team/consultants.       Current Facility-Administered Medications   Medication Dose Route Frequency    apixaban (ELIQUIS) tablet 2.5 mg  2.5 mg Oral BID    tbo-filgrastim (GRANIX) injection 300 mcg  300 mcg SubCUTAneous DAILY    folic acid (FOLVITE) tablet 1 mg  1 mg Oral DAILY    guaiFENesin ER (MUCINEX) tablet 600 mg  600 mg Oral Q12H    albuterol-ipratropium (DUO-NEB) 2.5 MG-0.5 MG/3 ML  3 mL Nebulization Q6HWA RT    polyethylene glycol (MIRALAX) packet 17 g  17 g Oral DAILY    amiodarone (CORDARONE) tablet 200 mg  200 mg Oral DAILY    [Held by provider] pantoprazole (PROTONIX) tablet 40 mg  40 mg Oral BID    midodrine (PROAMATINE) tablet 5 mg  5 mg Oral BID WITH MEALS    sodium chloride (NS) flush 5-40 mL  5-40 mL IntraVENous Q8H    sodium chloride (NS) flush 5-40 mL  5-40 mL IntraVENous PRN    acetaminophen (TYLENOL) tablet 650 mg  650 mg Oral Q6H PRN    Or    acetaminophen (TYLENOL) suppository 650 mg  650 mg Rectal Q6H PRN    polyethylene glycol (MIRALAX) packet 17 g  17 g Oral DAILY PRN    ondansetron (ZOFRAN ODT) tablet 4 mg  4 mg Oral Q8H PRN    Or    ondansetron (ZOFRAN) injection 4 mg  4 mg IntraVENous Q6H PRN        Review of Systems  Unchanged from initial consult    Objective:     Visit Vitals  BP 94/66 (BP 1 Location: Left upper arm, BP Patient Position: At rest)   Pulse (!) 113   Temp 97.7 °F (36.5 °C)   Resp 20   Ht 5' 4\" (1.626 m)   Wt 46 kg (101 lb 6.6 oz)   SpO2 90%   BMI 17.41 kg/m²    O2 Flow Rate (L/min): 3 l/min O2 Device: Nasal cannula    Temp (24hrs), Av.5 °F (36.4 °C), Min:97.4 °F (36.3 °C), Max:97.7 °F (36.5 °C)      No intake/output data recorded. No intake/output data recorded. General: Chronically ill  female, no acute distress, pleasant, 2 L nasal cannula  HEENT: Normal HEENT exam.    Lungs:  Normal effort and normal respiratory rate. 2 L nasal cannula  Heart: Normal rate, no obvious murmurs, no significant lower extremity edema. Abdomen: Abdomen is soft/nontender. Neurological: Patient is alert and oriented x3, no focal neurologic findings. Skin:  Warm and dry. No rashes. Lab/Data Review:  Recent Labs     22  1259 22   WBC 1.4* 1.2*   HGB 8.4* 9.3*   HCT 26.8* 29.9*    246     Recent Labs     22  1259 22  1038    138   K 3.6 4.1    106   CO2 31 28   * 105*   BUN 32* 27*   CREA 0.79 0.69   CA 8.1* 7.8*   MG 1.9 1.8     No results for input(s): PH, PCO2, PO2, HCO3, FIO2 in the last 72 hours.       Diagnostics   CXR Results  (Last 48 hours)    None             Assessment/Plan:     Principal Problem:    Acute respiratory failure with hypoxia ([address] (2022)    Active Problems:    Pulmonary emboli ([address] (2022)      Lung abscess ([address] (2022)      Deep vein thrombosis (DVT) of lower extremity ([address] (2022)      Atrial fibrillation ([address] (1/29/2022)      Constipation (1/29/2022)      GIB (gastrointestinal bleeding) (1/29/2022)      Overview: Recent      Chronic gastric ulcer (1/29/2022)      Overview: Recent      COVID-19 (1/29/2022)      Overview: Dec 2021      Anemia (1/29/2022)      Hypokalemia (1/29/2022)      Bilateral pleural effusion (1/29/2022)      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 Community-acquired pneumonia. An 24-year-old frail  female with a history of very extensive tobacco abuse. She has underlying chronic obstructive pulmonary disease but does not appear to be in exacerbation. She had a recent hospitalization [facility] couple of times. In 12/2021, she had COVID-19 pneumonia. More recently hospitalized with right middle lobe abscess. Patient underwent CT-guided drainage of abscess only 3 cc of foul-smelling fluid was drained after which she improved, WBC count came back to normal and she was discharged from the hospital on IV meropenem. Antibiotics for this have been discontinued. Okay for discharge home from a pulmonary standpoint. We will set up a follow-up visit in our office in 3 to 4 weeks  Needs repeat chest imaging in 4 to 6 weeks. 2.  Acute PE  On Eliquis  Continue with oxygen 2 L which she already has at home. 2.  Small to moderate bilateral pleural effusions. She appears to be third spacing. She also has dependent edema. Albumin is 1.7 only. Her EF is normal.  I believe that her effusions and third spacing is due to hypoalbuminemia and malnutrition. 3.  Atrial fibrillation and venous thromboembolism. Continue with Eliquis. Monitor hemoglobin. 4.  History of gastrointestinal bleed due to gastric ulcer, status post cauterization at [facility]. Continue with Protonix p.o. b.i.d. She also has gallbladder sludge.     5.  Leukopenia:  WBC count is improving slowly to 1.4  Hematology following, currently on Granix. No CBC today  Patient is off of antibiot    Questions of patient were answered at bedside in detail  Case discussed in detail with RN, RT, and care team  Thank you for involving me in the care of this patient  I will follow with you closely during hospitalization    Time spent more than 30 minutes under patient care with no overlap reviewing results and records, decision making, and answering questions.     Med DO Chinyere  [facility]

## 2022-02-07 NOTE — PROGRESS NOTES
Problem: Pressure Injury - Risk of  Goal: *Prevention of pressure injury  Description: Document Zeyad Scale and appropriate interventions in the flowsheet. Outcome: Progressing Towards Goal  Note: Pressure Injury Interventions:  Sensory Interventions: Assess changes in LOC,Float heels,Keep linens dry and wrinkle-free,Minimize linen layers    Moisture Interventions: Absorbent underpads,Minimize layers    Activity Interventions: Pressure redistribution bed/mattress(bed type),Increase time out of bed    Mobility Interventions: Float heels,HOB 30 degrees or less,Pressure redistribution bed/mattress (bed type)    Nutrition Interventions: Offer support with meals,snacks and hydration    Friction and Shear Interventions: HOB 30 degrees or less,Minimize layers                Problem: Patient Education: Go to Patient Education Activity  Goal: Patient/Family Education  Outcome: Progressing Towards Goal     Problem: Falls - Risk of  Goal: *Absence of Falls  Description: Document Esther Fall Risk and appropriate interventions in the flowsheet.   Outcome: Progressing Towards Goal  Note: Fall Risk Interventions:  Mobility Interventions: Bed/chair exit alarm,Patient to call before getting OOB    Mentation Interventions: Bed/chair exit alarm    Medication Interventions: Bed/chair exit alarm,Patient to call before getting OOB    Elimination Interventions: Bed/chair exit alarm,Call light in reach,Patient to call for help with toileting needs              Problem: Patient Education: Go to Patient Education Activity  Goal: Patient/Family Education  Outcome: Progressing Towards Goal

## 2022-02-07 NOTE — PROGRESS NOTES
Hematology Oncology Progress Note           Follow up for: leukopenia  Chart notes reviewed since last visit. No issues with granix  No fevers  CBC today still pending      Patient Vitals for the past 24 hrs:   BP Temp Pulse Resp SpO2 Height   02/07/22 1106 94/66 97.7 °F (36.5 °C) (!) 113 20 90 %    02/07/22 0847 (!) 88/64 97.4 °F (36.3 °C) (!) 113 18 90 %    02/07/22 0731     92 %    02/07/22 0728     (!) 88 %    02/07/22 0727     96 %    02/06/22 2000 108/61 97.5 °F (36.4 °C) 100 16 96 %    02/06/22 1942     95 %    02/06/22 1714 115/63 97.4 °F (36.3 °C) (!) 103 16 96 %    02/06/22 1540      5' 4\" (1.626 m)   02/06/22 1445     96 %        Review of Systems:    Constitutional No fevers, chills, night sweats, excessive fatigue or weight loss. Allergic/Immunologic No recent allergic reactions   Eyes No significant visual difficulties. No diplopia. ENMT No problems with hearing, no sore throat, no sinus drainage. Endocrine No hot flashes or night sweats. No cold intolerance, polyuria, or polydipsia   Hematologic/Lymphatic No easy bruising or bleeding. The patient denies any tender or palpable lymph nodes   Breasts No abnormal masses of breast, nipple discharge or pain. Respiratory No dyspnea on exertion, orthopnea, chest pain, cough or hemoptysis. Cardiovascular No anginal chest pain, irregular heart beat, tachycardia, palpitations or orthopnea. Gastrointestinal No nausea, vomiting, diarrhea, constipation, cramping, dysphagia, reflux, heartburn, GI bleeding, or early satiety. No change in bowel habits. Genitourinary (M) No hematuria, dysuria, increased frequency, urgency, hesitancy or incontinence. Musculoskeletal No joint pain, swelling or redness. No decreased range of motion. Integumentary No chronic rashes, inflammation, ulcerations, pruritus, petechiae, purpura, ecchymoses, or skin changes.    Neurologic No headache, blurred vision, and no areas of focal weakness or numbness. Normal gait. No sensory problems. Psychiatric No insomnia, depression, esha or mood swings. No psychotropic drugs       Physical Examination:  Constitutional Sedation to maintain respiratory support   Head Normocephalic; no scars   Eyes Conjunctivae and sclerae are clear and without icterus. Pupils are reactive and equal.   ENMT Sinuses are nontender. No oral exudates, ulcers, masses, thrush or mucositis. Oropharynx clear. Tongue normal.   Neck Supple without masses or thyromegaly. No jugular venous distension. Hematologic/Lymphatic No petechiae or purpura. No tender or palpable lymph nodes in the cervical, supraclavicular, axillary or inguinal area. Respiratory Lungs are clear to auscultation without rhonchi or wheezing. Cardiovascular Regular rate and rhythm of heart without murmurs, gallops or rubs. Chest / Line Site Chest is symmetric with no chest wall deformities. Abdomen Non-tender, non-distended, no masses, ascites or hepatosplenomegaly. Good bowel sounds. No guarding or rebound tenderness. No pulsatile masses. Musculoskeletal No tenderness or swelling, normal range of motion without obvious weakness. Extremities No visible deformities, no cyanosis, clubbing or edema. Skin No rashes, scars, or lesions suggestive of malignancy. No petechiae, purpura, or ecchymoses. No excoriations. Neurologic No sensory or motor deficits, normal cerebellar function, normal gait, cranial nerves intact. Psychiatric Alert and oriented times three. Coherent speech. Verbalizes understanding of our discussions today. Labs:  No results found for this or any previous visit (from the past 24 hour(s)). Imaging:    Assessment and Plan:   Leukopenia neutropenia:Severe. Most likely due to meropenem. R/o due to amiadarone  Less likely due to bone marrow pathology. Meropenem d/cd by ID. White count coming up. ANC 0.0. Check CBC. Will hold off on bone marrow biopsy.   Continue granix. Neutropenic precautions. Pt afebrile. Risk of infection discussed with pt daughter. -CBC today pending     Normocytic hypochromic anemia: Patient iron studies normal .check ferritin . Hb improving.     Constipation:Mangement per primary team.Better. D/w pt nurse.     Folate defficiency:Continue folic acid 1 mg every day.      Pulmonary emboli  and DVT:continue eliquis. Monitor for bleeding closely.      Lung abscess:S/p Meropenem. ID following. Better. D/w ID.  Yolanda Stephenson  H/o GI bleeding:no active bleeding clinically.

## 2022-02-07 NOTE — PROGRESS NOTES
Problem: Pressure Injury - Risk of  Goal: *Prevention of pressure injury  Description: Document Zeyad Scale and appropriate interventions in the flowsheet. Outcome: Progressing Towards Goal  Note: Pressure Injury Interventions:  Sensory Interventions: Assess changes in LOC,Float heels,Keep linens dry and wrinkle-free,Minimize linen layers    Moisture Interventions: Absorbent underpads,Minimize layers    Activity Interventions: Pressure redistribution bed/mattress(bed type),Increase time out of bed    Mobility Interventions: Float heels,HOB 30 degrees or less,Pressure redistribution bed/mattress (bed type)    Nutrition Interventions: Offer support with meals,snacks and hydration    Friction and Shear Interventions: HOB 30 degrees or less,Minimize layers                Problem: Patient Education: Go to Patient Education Activity  Goal: Patient/Family Education  Outcome: Progressing Towards Goal     Problem: Falls - Risk of  Goal: *Absence of Falls  Description: Document Esther Fall Risk and appropriate interventions in the flowsheet.   Outcome: Progressing Towards Goal  Note: Fall Risk Interventions:  Mobility Interventions: Bed/chair exit alarm,Patient to call before getting OOB    Mentation Interventions: Bed/chair exit alarm    Medication Interventions: Bed/chair exit alarm,Patient to call before getting OOB    Elimination Interventions: Bed/chair exit alarm,Call light in reach,Patient to call for help with toileting needs

## 2022-02-07 NOTE — PROGRESS NOTES
Hospitalist Progress Note               Daily Progress Note: 2/7/2022      Subjective:   Hospital course to date: Patient is an 24-year-old female with a very complex medical history in recent months. She was admitted for COVID-19 in December 2021 and then hospitalized at [facility] on 1/2/2022 with altered mental status. She was treated with meropenem. That hospital stay was complicated by pulmonary embolism. She was discharged on 1/6 on oral anticoagulation. On 1/7 admitted to [facility] for hypoxic respiratory failure. CT showed new development of cavitary lesion lateral right middle lobe. She was seen by ID and started back on IV Merrem with an anticipated stop date of 2/4. During that hospital stay she underwent EGD which showed a large duodenal bulb ulcer. She was discharged from [facility] on 1/24    Patient then presents to our facility on 1/29 with abdominal pain and constipation    Patient has been seen by multiple consultants this hospital stay including pulmonology, cardiology, ID and hematology, the latter for leukopenia and neutropenia.   There was concern that this was related to [facility] which has now been discontinued    ------    Patient seen and evaluated at bedside, of note currently has no new complaints, , discussed with RN    Problem List:  Problem List as of 2/7/2022 Date Reviewed: 1/29/2022          Codes Class Noted - Resolved    Pulmonary emboli ([facility] ICD-10-CM: I26.99  ICD-9-CM: 415.19  1/29/2022 - Present        * (Principal) Acute respiratory failure with hypoxia ([facility] ICD-10-CM: J96.01  ICD-9-CM: 518.81  1/29/2022 - Present        Lung abscess ([facility] ICD-10-CM: J85.2  ICD-9-CM: 513.0  1/29/2022 - Present        Deep vein thrombosis (DVT) of lower extremity ([facility] ICD-10-CM: I82.409  ICD-9-CM: 453.40  1/29/2022 - Present        Atrial fibrillation ([facility] ICD-10-CM: I48.91  ICD-9-CM: 427.31  1/29/2022 - Present        Constipation ICD-10-CM: K59.00  ICD-9-CM: 564.00  1/29/2022 - Present        GIB (gastrointestinal bleeding) (Chronic) ICD-10-CM: K92.2  ICD-9-CM: 578.9  1/29/2022 - Present    Overview Signed 1/29/2022  5:04 PM by Evelin Cheney MD     Recent             Chronic gastric ulcer (Chronic) ICD-10-CM: K25.7  ICD-9-CM: 531.70  1/29/2022 - Present    Overview Signed 1/29/2022  5:05 PM by Evelin Cheney MD     Recent             COVID-19 (Chronic) ICD-10-CM: U07.1  ICD-9-CM: 079.89  1/29/2022 - Present    Overview Signed 1/29/2022  5:05 PM by Evelin Cheney MD     Dec 2021             Anemia ICD-10-CM: D64.9  ICD-9-CM: 285.9  1/29/2022 - Present        Hypokalemia ICD-10-CM: E87.6  ICD-9-CM: 276.8  1/29/2022 - Present        Bilateral pleural effusion ICD-10-CM: J90  ICD-9-CM: 511.9  1/29/2022 - Present        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  ICD-9-CM: 751.60  1/29/2022 - Present        Hiatal hernia ICD-10-CM: K44.9  ICD-9-CM: 553.3  1/29/2022 - Present        Hypotension ICD-10-CM: I95.9  ICD-9-CM: 458.9  1/29/2022 - Present        Pleural effusion ICD-10-CM: J90  ICD-9-CM: 511.9  1/29/2022 - Present              Medications reviewed  Current Facility-Administered Medications   Medication Dose Route Frequency    apixaban (ELIQUIS) tablet 2.5 mg  2.5 mg Oral BID    tbo-filgrastim (GRANIX) injection 300 mcg  300 mcg SubCUTAneous DAILY    folic acid (FOLVITE) tablet 1 mg  1 mg Oral DAILY    guaiFENesin ER (MUCINEX) tablet 600 mg  600 mg Oral Q12H    albuterol-ipratropium (DUO-NEB) 2.5 MG-0.5 MG/3 ML  3 mL Nebulization Q6HWA RT    polyethylene glycol (MIRALAX) packet 17 g  17 g Oral DAILY    amiodarone (CORDARONE) tablet 200 mg  200 mg Oral DAILY    [Held by provider] pantoprazole (PROTONIX) tablet 40 mg  40 mg Oral BID    midodrine (PROAMATINE) tablet 5 mg  5 mg Oral BID WITH MEALS    sodium chloride (NS) flush 5-40 mL  5-40 mL IntraVENous Q8H    sodium chloride (NS) flush 5-40 mL  5-40 mL IntraVENous PRN    acetaminophen (TYLENOL) tablet 650 mg  650 mg Oral Q6H PRN    Or    acetaminophen (TYLENOL) suppository 650 mg  650 mg Rectal Q6H PRN    polyethylene glycol (MIRALAX) packet 17 g  17 g Oral DAILY PRN    ondansetron (ZOFRAN ODT) tablet 4 mg  4 mg Oral Q8H PRN    Or    ondansetron (ZOFRAN) injection 4 mg  4 mg IntraVENous Q6H PRN       Review of Systems:   A comprehensive review of systems was negative except for that written in the HPI. Objective:   Physical Exam:     Visit Vitals  BP (!) 88/64 (BP 1 Location: Left upper arm, BP Patient Position: At rest) Comment: Notified Rn   Pulse (!) 113 Comment: notified Rn   Temp 97.4 °F (36.3 °C)   Resp 18   Ht 5' 4\" (1.626 m)   Wt 46 kg (101 lb 6.6 oz)   SpO2 90%   BMI 17.41 kg/m²    O2 Flow Rate (L/min): 3 l/min O2 Device: Nasal cannula    Temp (24hrs), Av.5 °F (36.4 °C), Min:97.4 °F (36.3 °C), Max:97.6 °F (36.4 °C)    No intake/output data recorded. No intake/output data recorded. General:   Awake and alert   Lungs:   Clear to auscultation bilaterally. Chest wall:  No tenderness or deformity. Heart:  Regular rate and rhythm, S1, S2 normal, no murmur, click, rub or gallop. Abdomen:   Soft, non-tender. Bowel sounds normal. No masses,  No organomegaly. Extremities: Extremities normal, atraumatic, no cyanosis or edema. Pulses: 2+ and symmetric all extremities. Skin: Skin color, texture, turgor normal. No rashes or lesions   Neurologic: CNII-XII intact. No gross focal deficits         Data Review:       Recent Days:  Recent Labs     22  1259 22  2033 22  1324   WBC 1.4* 1.2* 0.7*   HGB 8.4* 9.3* 9.0*   HCT 26.8* 29.9* 29.2*    246 231     Recent Labs     22  1259 22  1038 22  1324    138 141   K 3.6 4.1 3.8    106 105   CO2 31 28 32   * 105* 135*   BUN 32* 27* 27*   CREA 0.79 0.69 0.71   CA 8.1* 7.8* 7.8*   MG 1.9 1.8 2.0     No results for input(s): PH, PCO2, PO2, HCO3, FIO2 in the last 72 hours.     24 Hour Results:  Recent Results (from the past 24 hour(s))   METABOLIC PANEL, BASIC    Collection Time: 02/06/22 12:59 PM   Result Value Ref Range    Sodium 141 136 - 145 mmol/L    Potassium 3.6 3.5 - 5.1 mmol/L    Chloride 105 97 - 108 mmol/L    CO2 31 21 - 32 mmol/L    Anion gap 5 5 - 15 mmol/L    Glucose 109 (H) 65 - 100 mg/dL    BUN 32 (H) 6 - 20 mg/dL    Creatinine 0.79 0.55 - 1.02 mg/dL    BUN/Creatinine ratio 41 (H) 12 - 20      GFR est AA >60 >60 ml/min/1.73m2    GFR est non-AA >60 >60 ml/min/1.73m2    Calcium 8.1 (L) 8.5 - 10.1 mg/dL   MAGNESIUM    Collection Time: 02/06/22 12:59 PM   Result Value Ref Range    Magnesium 1.9 1.6 - 2.4 mg/dL   FERRITIN    Collection Time: 02/06/22 12:59 PM   Result Value Ref Range    Ferritin 1,042 (H) 8 - 252 ng/mL   CBC WITH MANUAL DIFF    Collection Time: 02/06/22 12:59 PM   Result Value Ref Range    WBC 1.4 (L) 3.6 - 11.0 K/uL    RBC 2.73 (L) 3.80 - 5.20 M/uL    HGB 8.4 (L) 11.5 - 16.0 g/dL    HCT 26.8 (L) 35.0 - 47.0 %    MCV 98.2 80.0 - 99.0 FL    MCH 30.8 26.0 - 34.0 PG    MCHC 31.3 30.0 - 36.5 g/dL    RDW 18.5 (H) 11.5 - 14.5 %    PLATELET 005 619 - 209 K/uL    MPV 11.4 8.9 - 12.9 FL    NRBC 1.4 (H) 0.0  WBC    ABSOLUTE NRBC 0.02 (H) 0.00 - 0.01 K/uL    NEUTROPHILS 1 (L) 32 - 75 %    BAND NEUTROPHILS 0 0 - 6 %    LYMPHOCYTES 37 12 - 49 %    MONOCYTES 62 (H) 5 - 13 %    EOSINOPHILS 0 0 - 7 %    BASOPHILS 0 0 - 1 %    METAMYELOCYTES 0 0 %    MYELOCYTES 0 0 %    PROMYELOCYTES 0 0 %    BLASTS 0 0 %    OTHER CELL 0 %    IMMATURE GRANULOCYTES 0 %    ABS. NEUTROPHILS 0.0 (L) 1.8 - 8.0 K/UL    ABS. LYMPHOCYTES 0.5 (L) 0.8 - 3.5 K/UL    ABS. MONOCYTES 0.9 0.0 - 1.0 K/UL    ABS. EOSINOPHILS 0.0 0.0 - 0.4 K/UL    ABS. BASOPHILS 0.0 0.0 - 0.1 K/UL    ABS. IMM.  GRANS. 0.0 K/UL    DF Manual      RBC COMMENTS Microcytosis  1+        RBC COMMENTS Ovalocytes  1+        DIFFERENTIAL Manual Differenctial Ordered         XR ABD (AP AND ERECT OR DECUB)   Final Result      XR CHEST PORT   Final Result   Comparison 1/29/2022. Interval development of mild pulmonary edema. CTA CHEST W OR W WO CONT   Final Result   Positive for pulmonary embolus, right lower lobe, acute versus subacute; the   report communicated to Roxanna Felton, 1451 hours. Cardiomegaly with bilateral   pleural effusions. Atherosclerosis. Hiatus hernia with partially intrathoracic   stomach. Distended gallbladder with sludge. Moderate rectal distention by stool. Degenerative changes of the spine. Convex leftward scoliosis, centered at the   L1-L2 level. CT ABD PELV W CONT   Final Result   Positive for pulmonary embolus, right lower lobe, acute versus subacute; the   report communicated to Roxanna Felton, 1451 hours. Cardiomegaly with bilateral   pleural effusions. Atherosclerosis. Hiatus hernia with partially intrathoracic   stomach. Distended gallbladder with sludge. Moderate rectal distention by stool. Degenerative changes of the spine. Convex leftward scoliosis, centered at the   L1-L2 level. XR CHEST PORT   Final Result   Comparison 6/22/2020. Central line position as above. Right hemithorax pleural fluid/pleural thickening. Additional chronic findings   as above. 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 On Eliquis    Peptic ulcer disease with recent large duodenal bulb ulcer      Plan:    Continue supportive care  Hematology following, continue granix daily  Continue to follow [address] discussed with: Patient/Family    Disposition: Possible SNF, awaiting some improvement in WBC      Total time spent with patient: 30 minutes.     John Godfrey MD

## 2022-02-08 LAB
ANION GAP SERPL CALC-SCNC: 8 MMOL/L (ref 5–15)
BUN SERPL-MCNC: 38 MG/DL (ref 6–20)
BUN/CREAT SERPL: 43 (ref 12–20)
CA-I BLD-MCNC: 7.9 MG/DL (ref 8.5–10.1)
CHLORIDE SERPL-SCNC: 105 MMOL/L (ref 97–108)
CO2 SERPL-SCNC: 30 MMOL/L (ref 21–32)
CREAT SERPL-MCNC: 0.89 MG/DL (ref 0.55–1.02)
ERYTHROCYTE [DISTWIDTH] IN BLOOD BY AUTOMATED COUNT: 18.6 % (ref 11.5–14.5)
GLUCOSE SERPL-MCNC: 59 MG/DL (ref 65–100)
HCT VFR BLD AUTO: 26.5 % (ref 35–47)
HGB BLD-MCNC: 8.4 G/DL (ref 11.5–16)
MAGNESIUM SERPL-MCNC: 2 MG/DL (ref 1.6–2.4)
MAGNESIUM SERPL-MCNC: 2 MG/DL (ref 1.6–2.4)
MCH RBC QN AUTO: 31.2 PG (ref 26–34)
MCHC RBC AUTO-ENTMCNC: 31.7 G/DL (ref 30–36.5)
MCV RBC AUTO: 98.5 FL (ref 80–99)
NRBC # BLD: 0.08 K/UL (ref 0–0.01)
NRBC BLD-RTO: 1 PER 100 WBC
PLATELET # BLD AUTO: 268 K/UL (ref 150–400)
PMV BLD AUTO: 12.3 FL (ref 8.9–12.9)
POTASSIUM SERPL-SCNC: 3.6 MMOL/L (ref 3.5–5.1)
RBC # BLD AUTO: 2.69 M/UL (ref 3.8–5.2)
SODIUM SERPL-SCNC: 143 MMOL/L (ref 136–145)
WBC # BLD AUTO: 7.7 K/UL (ref 3.6–11)

## 2022-02-08 PROCEDURE — 85027 COMPLETE CBC AUTOMATED: CPT

## 2022-02-08 PROCEDURE — 94640 AIRWAY INHALATION TREATMENT: CPT

## 2022-02-08 PROCEDURE — 65270000029 HC RM PRIVATE

## 2022-02-08 PROCEDURE — 74011250636 HC RX REV CODE- 250/636: Performed by: INTERNAL MEDICINE

## 2022-02-08 PROCEDURE — 97530 THERAPEUTIC ACTIVITIES: CPT

## 2022-02-08 PROCEDURE — 74011250637 HC RX REV CODE- 250/637: Performed by: STUDENT IN AN ORGANIZED HEALTH CARE EDUCATION/TRAINING PROGRAM

## 2022-02-08 PROCEDURE — 94760 N-INVAS EAR/PLS OXIMETRY 1: CPT

## 2022-02-08 PROCEDURE — 74011250637 HC RX REV CODE- 250/637: Performed by: INTERNAL MEDICINE

## 2022-02-08 PROCEDURE — 77010033678 HC OXYGEN DAILY

## 2022-02-08 PROCEDURE — 83735 ASSAY OF MAGNESIUM: CPT

## 2022-02-08 PROCEDURE — 74011000250 HC RX REV CODE- 250: Performed by: INTERNAL MEDICINE

## 2022-02-08 PROCEDURE — 36415 COLL VENOUS BLD VENIPUNCTURE: CPT

## 2022-02-08 PROCEDURE — 80048 BASIC METABOLIC PNL TOTAL CA: CPT

## 2022-02-08 PROCEDURE — 74011250637 HC RX REV CODE- 250/637: Performed by: NURSE PRACTITIONER

## 2022-02-08 RX ORDER — MIDODRINE HYDROCHLORIDE 5 MG/1
5 TABLET ORAL
Status: DISCONTINUED | OUTPATIENT
Start: 2022-02-08 | End: 2022-02-10 | Stop reason: HOSPADM

## 2022-02-08 RX ADMIN — FOLIC ACID 1 MG: 1 TABLET ORAL at 08:44

## 2022-02-08 RX ADMIN — POLYETHYLENE GLYCOL 3350 17 G: 17 POWDER, FOR SOLUTION ORAL at 08:44

## 2022-02-08 RX ADMIN — SODIUM CHLORIDE, PRESERVATIVE FREE 10 ML: 5 INJECTION INTRAVENOUS at 06:55

## 2022-02-08 RX ADMIN — SODIUM CHLORIDE, PRESERVATIVE FREE 10 ML: 5 INJECTION INTRAVENOUS at 16:38

## 2022-02-08 RX ADMIN — MIDODRINE HYDROCHLORIDE 5 MG: 5 TABLET ORAL at 08:00

## 2022-02-08 RX ADMIN — APIXABAN 2.5 MG: 2.5 TABLET, FILM COATED ORAL at 08:44

## 2022-02-08 RX ADMIN — MIDODRINE HYDROCHLORIDE 5 MG: 5 TABLET ORAL at 17:00

## 2022-02-08 RX ADMIN — APIXABAN 2.5 MG: 2.5 TABLET, FILM COATED ORAL at 21:02

## 2022-02-08 RX ADMIN — IPRATROPIUM BROMIDE AND ALBUTEROL SULFATE 3 ML: .5; 2.5 SOLUTION RESPIRATORY (INHALATION) at 14:00

## 2022-02-08 RX ADMIN — GUAIFENESIN 600 MG: 600 TABLET, EXTENDED RELEASE ORAL at 21:02

## 2022-02-08 RX ADMIN — IPRATROPIUM BROMIDE AND ALBUTEROL SULFATE 3 ML: .5; 2.5 SOLUTION RESPIRATORY (INHALATION) at 19:46

## 2022-02-08 RX ADMIN — GUAIFENESIN 600 MG: 600 TABLET, EXTENDED RELEASE ORAL at 08:44

## 2022-02-08 RX ADMIN — AMIODARONE HYDROCHLORIDE 200 MG: 200 TABLET ORAL at 08:44

## 2022-02-08 RX ADMIN — TBO-FILGRASTIM 300 MCG: 300 INJECTION, SOLUTION SUBCUTANEOUS at 08:44

## 2022-02-08 RX ADMIN — SODIUM CHLORIDE, PRESERVATIVE FREE 10 ML: 5 INJECTION INTRAVENOUS at 21:02

## 2022-02-08 RX ADMIN — IPRATROPIUM BROMIDE AND ALBUTEROL SULFATE 3 ML: .5; 2.5 SOLUTION RESPIRATORY (INHALATION) at 07:22

## 2022-02-08 NOTE — PROGRESS NOTES
New SLP consult received. Spoke with pt and son Mari Marte at bedside. PO diet has been upgraded to easy to chew and thin. Pt's son reports pt is tolerating improved intake without overt s/s aspiration. Reviewed swallow safety precautions. Understanding expressed. Pt reports tongue/mouth pain- MD made aware. No further SLP intervention indicated at this time. Will d/c SLP consult at this time. Please re-consult SLP if aspiration concerns are present. RD is following pt.

## 2022-02-08 NOTE — PROGRESS NOTES
Pulmonary/CC Progress Note  Elderly patient with history of extensive tobacco abuse, COPD recently treated for COVID-19 pneumonia in December 2021. Recently she was hospitalized with right middle lobe abscess and had been on IV meropenem. CT scan showed subacute thrombus    Subjective:   Daily Progress Note: 2/8/2022     Patient seen and examined in her room on the floor this afternoon, no acute events overnight. I discussed the case in detail with the patient's daughter at the bedside, she is saturating well on 1 L nasal cannula. Apparently she is on 2 L nasal cannula at home after a recent discharge from [facility], we can try to wean this off as an outpatient. She has good pulmonary follow-up in our office with PFTs/COPD management as well as repeat imaging as an outpatient to monitor her pulmonary abscess. CBC today shows that her white blood cell count is come up to 7.7 with Granix. Appreciate assistance from hematology, this medication will be held now. Continue on Eliquis for known VTE    From a pulmonary standpoint, the patient can be discharged whenever felt appropriate by the primary team/consultants. The pulmonary service will follow peripherally while she is admitted. Please call with questions in the meantime.       Current Facility-Administered Medications   Medication Dose Route Frequency    midodrine (PROAMATINE) tablet 5 mg  5 mg Oral TID WITH MEALS    apixaban (ELIQUIS) tablet 2.5 mg  2.5 mg Oral BID    folic acid (FOLVITE) tablet 1 mg  1 mg Oral DAILY    guaiFENesin ER (MUCINEX) tablet 600 mg  600 mg Oral Q12H    albuterol-ipratropium (DUO-NEB) 2.5 MG-0.5 MG/3 ML  3 mL Nebulization Q6HWA RT    polyethylene glycol (MIRALAX) packet 17 g  17 g Oral DAILY    amiodarone (CORDARONE) tablet 200 mg  200 mg Oral DAILY    [Held by provider] pantoprazole (PROTONIX) tablet 40 mg  40 mg Oral BID    sodium chloride (NS) flush 5-40 mL  5-40 mL IntraVENous Q8H    sodium chloride (NS) flush 5-40 mL  5-40 mL IntraVENous PRN    acetaminophen (TYLENOL) tablet 650 mg  650 mg Oral Q6H PRN    Or    acetaminophen (TYLENOL) suppository 650 mg  650 mg Rectal Q6H PRN    polyethylene glycol (MIRALAX) packet 17 g  17 g Oral DAILY PRN    ondansetron (ZOFRAN ODT) tablet 4 mg  4 mg Oral Q8H PRN    Or    ondansetron (ZOFRAN) injection 4 mg  4 mg IntraVENous Q6H PRN        Review of Systems  Unchanged from initial consult    Objective:     Visit Vitals  BP 94/65 (BP 1 Location: Left upper arm, BP Patient Position: Sitting)   Pulse 80   Temp 97.5 °F (36.4 °C)   Resp 18   Ht 5' 4\" (1.626 m)   Wt 46 kg (101 lb 6.6 oz)   SpO2 94%   BMI 17.41 kg/m²    O2 Flow Rate (L/min): 1 l/min O2 Device: Nasal cannula    Temp (24hrs), Av.7 °F (36.5 °C), Min:97.5 °F (36.4 °C), Max:98 °F (36.7 °C)      No intake/output data recorded. No intake/output data recorded. General: Chronically ill  female, no acute distress, pleasant, 1 L nasal cannula  HEENT: Normal HEENT exam.    Lungs:  Normal effort and normal respiratory rate. 1 L nasal cannula  Heart: Normal rate, no obvious murmurs, no significant lower extremity edema. Abdomen: Abdomen is soft/nontender. Neurological: Patient is alert and oriented x3, no focal neurologic findings. Skin:  Warm and dry. No rashes. Lab/Data Review:  Recent Labs     22  0905 22  1605 22  1259   WBC 7.7 3.0* 1.4*   HGB 8.4* 8.3* 8.4*   HCT 26.5* 26.5* 26.8*    240 238     Recent Labs     22  0905 22  1605 22  1259    143 141   K 3.6 3.4* 3.6    106 105   CO2 30 30 31   GLU 59* 89 109*   BUN 38* 36* 32*   CREA 0.89 0.85 0.79   CA 7.9* 8.0* 8.1*   MG 2.0 2.0 1.9     No results for input(s): PH, PCO2, PO2, HCO3, FIO2 in the last 72 hours.       Diagnostics   CXR Results  (Last 48 hours)    None             Assessment/Plan:     Principal Problem:    Acute respiratory failure with hypoxia (HCC) (1/29/2022)    Active Problems:    Pulmonary emboli ([address] (1/29/2022)      Lung abscess ([address] (1/29/2022)      Deep vein thrombosis (DVT) of lower extremity ([address] (1/29/2022)      Atrial fibrillation ([address] (1/29/2022)      Constipation (1/29/2022)      GIB (gastrointestinal bleeding) (1/29/2022)      Overview: Recent      Chronic gastric ulcer (1/29/2022)      Overview: Recent      COVID-19 (1/29/2022)      Overview: Dec 2021      Anemia (1/29/2022)      Hypokalemia (1/29/2022)      Bilateral pleural effusion (1/29/2022)      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 Community-acquired pneumonia. An 65-year-old frail  female with a history of very extensive tobacco abuse. She has underlying chronic obstructive pulmonary disease but does not appear to be in exacerbation. She had a recent hospitalization in [facility] a couple of times. In 12/2021, she had COVID-19 pneumonia. More recently hospitalized with right middle lobe abscess. Patient underwent CT-guided drainage of abscess only 3 cc of foul-smelling fluid was drained after which she improved, WBC count came back to normal and she was discharged from the hospital on IV meropenem. Antibiotics for this have been discontinued. Okay for discharge home from a pulmonary standpoint. We will set up a follow-up visit in our office in 3 to 4 weeks  Needs repeat chest imaging in 4 to 6 weeks. The pulmonary service will continue to follow peripherally, please call with questions in the meantime. 2.  Acute PE  On Eliquis  Continue with oxygen 2 L which she already has at home. 2.  Small to moderate bilateral pleural effusions. She appears to be third spacing. She also has dependent edema. Albumin is 1.7 only. Her EF is normal.  I believe that her effusions and third spacing is due to hypoalbuminemia and malnutrition.     3.  Atrial fibrillation and venous thromboembolism. Continue with Eliquis. Monitor hemoglobin. 4.  History of gastrointestinal bleed due to gastric ulcer, status post cauterization at [facility]. Continue with Protonix p.o. b.i.d. She also has gallbladder sludge. 5.  Leukopenia:  WBC count is improving slowly to 7.7 today  Hematology following, Granix now being held  Patient is off of antibiotics    Questions of patient were answered at bedside in detail  Case discussed in detail with RN, RT, and care team  Thank you for involving me in the care of this patient  The pulmonary service will follow peripherally    Time spent more than 30 minutes under patient care with no overlap reviewing results and records, decision making, and answering questions.     Jacobo Lewis DO  [facility]

## 2022-02-08 NOTE — PROGRESS NOTES
PHYSICAL THERAPY TREATMENT  Patient: Tanna Jackson (89 y.o. female)  Date: 2/8/2022  Diagnosis: Acute respiratory failure with hypoxia ([facility] [J96.01]  Pleural effusion [J90]  Lung abscess ([facility] [J85.2] Acute respiratory failure with hypoxia [facility])       Precautions: Fall  Chart, physical therapy assessment, plan of care and goals were reviewed. ASSESSMENT  Patient continues with skilled PT services and is progressing towards goals. Patient was side laying in bed upon approach and agreed to therapy. Performed supine to sit EOB at mod Ax2 were patient required constant support to remain upright. Patient then performed STS at mod Ax2. Ambulated at max Ax2 with RW for 3 feet. Patient stopped advancing BLE, required max VC to advance however patient would not advance BLE so therapists placed patient into chair with total assist x2. Patient perform stand > sit at max ax2 were patient performed seated TE ( see details below) Patient benefited from constant VC and TC to continue participation in [employer]. Patient left seated in chair with call bell within reach and family member present. Current Level of Function Impacting Discharge (mobility/balance):  poor sitting balance, needs constant support sitting and standing. Other factors to consider for discharge: PLOF, assistance at home, balance deficits, instability with sitting and standing. PLAN :  Patient continues to benefit from skilled intervention to address the above impairments. Continue treatment per established plan of care. to address goals. Recommendation for discharge: (in order for the patient to meet his/her long term goals)  Krishan Driscoll    This discharge recommendation:  Has been made in collaboration with the attending provider and/or case management    IF patient discharges home will need the following DME: gait belt and rolling walker       SUBJECTIVE:   Patient stated im a little lightheaded. [place]    OBJECTIVE DATA SUMMARY: Critical Behavior:  Neurologic State: Alert,Confused  Orientation Level: Oriented to person  Cognition: Decreased command following     Functional Mobility Training:  Bed Mobility:  Supine to Sit: Moderate assistance;Assist x2  Sit to Supine: Moderate assistance  Scooting: Moderate assistance;Assist x2  Transfers:  Sit to Stand: Moderate assistance;Assist x2  Stand to Sit: Moderate assistance;Assist x2  Bed to Chair: Moderate assistance;Assist x2  Balance:  Sitting: Impaired; With support  Sitting - Static: Poor (constant support)  Sitting - Dynamic: Poor (constant support)  Standing: Impaired; With support  Standing - Static: Constant support;Poor  Standing - Dynamic : Constant support;Poor  Ambulation/Gait Training:  Distance (ft): 3 Feet (ft)  Assistive Device: Gait belt;Walker, rolling  Ambulation - Level of Assistance: Maximum assistance;Assist x2    Therapeutic Exercises:   1x10 hip ADD/ABD  Pain Ratin-6/10 via facial scale    Activity Tolerance:   Poor and requires rest breaks  Please refer to the flowsheet for vital signs taken during this treatment. After treatment patient left in no apparent distress:   Sitting in chair, Call bell within reach, and Caregiver / family present    COMMUNICATION/COLLABORATION:   The patients plan of care was discussed with: Registered nurse treatment occurs with OT due to patient needing assistx2. Problem: Mobility Impaired (Adult and Pediatric)  Goal: *Acute Goals and Plan of Care (Insert Text)  Description: Pt will be I with LE HEP in 7 days. Pt will perform bed mobility with min Ax1 in 7 days. Pt will perform transfers with min Ax1 in 7 days. Pt will amb 25-50 feet with LRAD safely with min Ax1 in 7 days.        Outcome: Progressing Towards Goal       [facility] PTA   Time Calculation: 22 mins

## 2022-02-08 NOTE — PROGRESS NOTES
Hospitalist Progress Note         Thomas Reveles MD          Daily Progress Note: 2/8/2022      Subjective: The patient is seen for follow  up. Hospital course to date: Patient is an 39-year-old female with a very complex medical history in recent months. She was admitted for COVID-19 in December 2021 and then hospitalized at [facility] on 1/2/2022 with altered mental status. She was treated with meropenem. That hospital stay was complicated by pulmonary embolism. She was discharged on 1/6 on oral anticoagulation.     On 1/7 admitted to [facility] for hypoxic respiratory failure. CT showed new development of cavitary lesion lateral right middle lobe. She was seen by ID and started back on IV Merrem with an anticipated stop date of 2/4. During that hospital stay she underwent EGD which showed a large duodenal bulb ulcer. She was discharged from [facility] on 1/24     Patient then presents to our facility on 1/29 with abdominal pain and constipation     Patient has been seen by multiple consultants this hospital stay including pulmonology, cardiology, ID and hematology, the latter for leukopenia and neutropenia. There was concern that this was related to [facility] which has now been discontinued     ___    Patient seen in follow-up. Tolerating  Oral diets.   WBC count increasing    Problem List:  Problem List as of 2/8/2022 Date Reviewed: 1/29/2022          Codes Class Noted - Resolved    Pulmonary emboli ([facility] ICD-10-CM: I26.99  ICD-9-CM: 415.19  1/29/2022 - Present        * (Principal) Acute respiratory failure with hypoxia [facility]) ICD-10-CM: J96.01  ICD-9-CM: 518.81  1/29/2022 - Present        Lung abscess ([facility] ICD-10-CM: J85.2  ICD-9-CM: 513.0  1/29/2022 - Present        Deep vein thrombosis (DVT) of lower extremity ([facility] ICD-10-CM: I82.409  ICD-9-CM: 453.40  1/29/2022 - Present        Atrial fibrillation ([facility] ICD-10-CM: I48.91  ICD-9-CM: 427.31  1/29/2022 - Present Constipation ICD-10-CM: K59.00  ICD-9-CM: 564.00  1/29/2022 - Present        GIB (gastrointestinal bleeding) (Chronic) ICD-10-CM: K92.2  ICD-9-CM: 578.9  1/29/2022 - Present    Overview Signed 1/29/2022  5:04 PM by Mesfin Lovett MD     Recent             Chronic gastric ulcer (Chronic) ICD-10-CM: K25.7  ICD-9-CM: 531.70  1/29/2022 - Present    Overview Signed 1/29/2022  5:05 PM by Mesfin Lovett MD     Recent             COVID-19 (Chronic) ICD-10-CM: U07.1  ICD-9-CM: 079.89  1/29/2022 - Present    Overview Signed 1/29/2022  5:05 PM by Mesfin Lovett MD     Dec 2021             Anemia ICD-10-CM: D64.9  ICD-9-CM: 285.9  1/29/2022 - Present        Hypokalemia ICD-10-CM: E87.6  ICD-9-CM: 276.8  1/29/2022 - Present        Bilateral pleural effusion ICD-10-CM: J90  ICD-9-CM: 511.9  1/29/2022 - Present        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  ICD-9-CM: 751.60  1/29/2022 - Present        Hiatal hernia ICD-10-CM: K44.9  ICD-9-CM: 553.3  1/29/2022 - Present        Hypotension ICD-10-CM: I95.9  ICD-9-CM: 458.9  1/29/2022 - Present        Pleural effusion ICD-10-CM: J90  ICD-9-CM: 511.9  1/29/2022 - Present              Medications reviewed  Current Facility-Administered Medications   Medication Dose Route Frequency    apixaban (ELIQUIS) tablet 2.5 mg  2.5 mg Oral BID    tbo-filgrastim (GRANIX) injection 300 mcg  300 mcg SubCUTAneous DAILY    folic acid (FOLVITE) tablet 1 mg  1 mg Oral DAILY    guaiFENesin ER (MUCINEX) tablet 600 mg  600 mg Oral Q12H    albuterol-ipratropium (DUO-NEB) 2.5 MG-0.5 MG/3 ML  3 mL Nebulization Q6HWA RT    polyethylene glycol (MIRALAX) packet 17 g  17 g Oral DAILY    amiodarone (CORDARONE) tablet 200 mg  200 mg Oral DAILY    [Held by provider] pantoprazole (PROTONIX) tablet 40 mg  40 mg Oral BID    midodrine (PROAMATINE) tablet 5 mg  5 mg Oral BID WITH MEALS    sodium chloride (NS) flush 5-40 mL  5-40 mL IntraVENous Q8H    sodium chloride (NS) flush 5-40 mL  5-40 mL IntraVENous PRN  acetaminophen (TYLENOL) tablet 650 mg  650 mg Oral Q6H PRN    Or    acetaminophen (TYLENOL) suppository 650 mg  650 mg Rectal Q6H PRN    polyethylene glycol (MIRALAX) packet 17 g  17 g Oral DAILY PRN    ondansetron (ZOFRAN ODT) tablet 4 mg  4 mg Oral Q8H PRN    Or    ondansetron (ZOFRAN) injection 4 mg  4 mg IntraVENous Q6H PRN       Review of Systems:   Review of systems not obtained due to patient factors. Objective:   Physical Exam:     Visit Vitals  BP (!) 86/56 (BP 1 Location: Left upper arm, BP Patient Position: Supine)   Pulse 60   Temp 97.7 °F (36.5 °C)   Resp 18   Ht 5' 4\" (1.626 m)   Wt 46 kg (101 lb 6.6 oz)   SpO2 98%   BMI 17.41 kg/m²    O2 Flow Rate (L/min): 1 l/min O2 Device: Nasal cannula    Temp (24hrs), Av.9 °F (36.6 °C), Min:97.6 °F (36.4 °C), Max:98.2 °F (36.8 °C)    No intake/output data recorded. No intake/output data recorded. General:  Alert, cooperative, no distress, appears stated age. Lungs:   Clear to auscultation bilaterally. Chest wall:  No tenderness or deformity. Heart:  Regular rate and rhythm, S1, S2 normal, no murmur, click, rub or gallop. Abdomen:   Soft, non-tender. Bowel sounds normal. No masses,  No organomegaly. Extremities: Extremities normal, atraumatic, no cyanosis or edema. Pulses: 2+ and symmetric all extremities. Skin: Skin color, texture, turgor normal. No rashes or lesions   Neurologic: CNII-XII intact. No gross sensory or motor deficits     Data Review:       Recent Days:  Recent Labs     22  0905 22  1605 22  1259   WBC 7.7 3.0* 1.4*   HGB 8.4* 8.3* 8.4*   HCT 26.5* 26.5* 26.8*    240 238     Recent Labs     22  0905 22  1605 22  1259    143 141   K 3.6 3.4* 3.6    106 105   CO2 30 30 31   GLU 59* 89 109*   BUN 38* 36* 32*   CREA 0.89 0.85 0.79   CA 7.9* 8.0* 8.1*   MG  --  2.0 1.9     No results for input(s): PH, PCO2, PO2, HCO3, FIO2 in the last 72 hours.     24 Hour Results:  Recent Results (from the past 24 hour(s))   METABOLIC PANEL, BASIC    Collection Time: 02/07/22  4:05 PM   Result Value Ref Range    Sodium 143 136 - 145 mmol/L    Potassium 3.4 (L) 3.5 - 5.1 mmol/L    Chloride 106 97 - 108 mmol/L    CO2 30 21 - 32 mmol/L    Anion gap 7 5 - 15 mmol/L    Glucose 89 65 - 100 mg/dL    BUN 36 (H) 6 - 20 mg/dL    Creatinine 0.85 0.55 - 1.02 mg/dL    BUN/Creatinine ratio 42 (H) 12 - 20      GFR est AA >60 >60 ml/min/1.73m2    GFR est non-AA >60 >60 ml/min/1.73m2    Calcium 8.0 (L) 8.5 - 10.1 mg/dL   MAGNESIUM    Collection Time: 02/07/22  4:05 PM   Result Value Ref Range    Magnesium 2.0 1.6 - 2.4 mg/dL   CBC WITH MANUAL DIFF    Collection Time: 02/07/22  4:05 PM   Result Value Ref Range    WBC 3.0 (L) 3.6 - 11.0 K/uL    RBC 2.68 (L) 3.80 - 5.20 M/uL    HGB 8.3 (L) 11.5 - 16.0 g/dL    HCT 26.5 (L) 35.0 - 47.0 %    MCV 98.9 80.0 - 99.0 FL    MCH 31.0 26.0 - 34.0 PG    MCHC 31.3 30.0 - 36.5 g/dL    RDW 18.5 (H) 11.5 - 14.5 %    PLATELET 355 670 - 645 K/uL    MPV 11.1 8.9 - 12.9 FL    NRBC 1.3 (H) 0.0  WBC    ABSOLUTE NRBC 0.04 (H) 0.00 - 0.01 K/uL    NEUTROPHILS 30 (L) 32 - 75 %    BAND NEUTROPHILS 5 0 - 6 %    LYMPHOCYTES 27 12 - 49 %    MONOCYTES 28 (H) 5 - 13 %    EOSINOPHILS 0 0 - 7 %    BASOPHILS 0 0 - 1 %    METAMYELOCYTES 5 (H) 0 %    MYELOCYTES 5 (H) 0 %    PROMYELOCYTES 0 0 %    BLASTS 0 0 %    OTHER CELL 0 %    IMMATURE GRANULOCYTES 0 %    ABS. NEUTROPHILS 1.1 (L) 1.8 - 8.0 K/UL    ABS. LYMPHOCYTES 0.8 0.8 - 3.5 K/UL    ABS. MONOCYTES 0.8 0.0 - 1.0 K/UL    ABS. EOSINOPHILS 0.0 0.0 - 0.4 K/UL    ABS. BASOPHILS 0.0 0.0 - 0.1 K/UL    ABS. IMM.  GRANS. 0.0 K/UL    DF Manual      RBC COMMENTS Anisocytosis  1+        RBC COMMENTS Macrocytosis  1+        DIFFERENTIAL Manual Differenctial Ordered     CBC W/O DIFF    Collection Time: 02/08/22  9:05 AM   Result Value Ref Range    WBC 7.7 3.6 - 11.0 K/uL    RBC 2.69 (L) 3.80 - 5.20 M/uL    HGB 8.4 (L) 11.5 - 16.0 g/dL    HCT 26.5 (L) 35.0 - 47.0 %    MCV 98.5 80.0 - 99.0 FL    MCH 31.2 26.0 - 34.0 PG    MCHC 31.7 30.0 - 36.5 g/dL    RDW 18.6 (H) 11.5 - 14.5 %    PLATELET 233 134 - 679 K/uL    MPV 12.3 8.9 - 12.9 FL    NRBC 1.0 (H) 0.0  WBC    ABSOLUTE NRBC 0.08 (H) 0.00 - 0.17 K/uL   METABOLIC PANEL, BASIC    Collection Time: 02/08/22  9:05 AM   Result Value Ref Range    Sodium 143 136 - 145 mmol/L    Potassium 3.6 3.5 - 5.1 mmol/L    Chloride 105 97 - 108 mmol/L    CO2 30 21 - 32 mmol/L    Anion gap 8 5 - 15 mmol/L    Glucose 59 (L) 65 - 100 mg/dL    BUN 38 (H) 6 - 20 mg/dL    Creatinine 0.89 0.55 - 1.02 mg/dL    BUN/Creatinine ratio 43 (H) 12 - 20      GFR est AA >60 >60 ml/min/1.73m2    GFR est non-AA >60 >60 ml/min/1.73m2    Calcium 7.9 (L) 8.5 - 10.1 mg/dL           Assessment/     Abdominal pain and constipation, improving     Right lung abscess, status post treatment with meropenem              -Improved, antibiotics have been discontinued     Chronic debility     Leukopenia/neutropenia, likely represents agranulocytosis due to meropenem which has been discontinued     Accessible atrial fibrillation, on Eliquis and amiodarone     History of DVT and pulmonary embolism. On Eliquis     Peptic ulcer disease with recent large duodenal bulb ulcer        Plan:     Continue supportive care  Clinical updates provided to son and daughter  Patient's mobility limitations impair his ability to participate in activities such as toileting feeding dressing grooming and bathing, mobility limitations cannot be resolved by use of cane or walker. Patient is safely able to use a manual wheelchair. Patient's functional mobility deficit can be resolved by the use of a manual wheelchair and patient can maneuver the chair independently. Patient has a caregiver that can propel the chair    Anticipate discharge to home with home health in a.m. Care Plan discussed with: Nurse    Total time spent with patient: 30 minutes.     Nickie Maciel Parth Escoto MD

## 2022-02-08 NOTE — PROGRESS NOTES
Hematology Oncology Progress Note           Follow up for: neutropenia  Chart notes reviewed since last visit. Breathing stable  No fevers or other changes      Patient Vitals for the past 24 hrs:   BP Temp Pulse Resp SpO2   02/08/22 1453 94/65 97.5 °F (36.4 °C) 80 18 94 %   02/08/22 1129     98 %   02/08/22 1048 (!) 86/56 97.7 °F (36.5 °C) 60 18 96 %   02/08/22 0722     92 %   02/08/22 0721 92/62 97.6 °F (36.4 °C) 61 18 92 %   02/08/22 0400 110/61 97.8 °F (36.6 °C) 98 20 95 %   02/07/22 2227     94 %   02/07/22 2000 109/70 98 °F (36.7 °C) 100 22 95 %       Review of Systems:    Constitutional No fevers, chills, night sweats, excessive fatigue or weight loss. Allergic/Immunologic No recent allergic reactions   Eyes No significant visual difficulties. No diplopia. ENMT No problems with hearing, no sore throat, no sinus drainage. Endocrine No hot flashes or night sweats. No cold intolerance, polyuria, or polydipsia   Hematologic/Lymphatic No easy bruising or bleeding. The patient denies any tender or palpable lymph nodes   Breasts No abnormal masses of breast, nipple discharge or pain. Respiratory No dyspnea on exertion, orthopnea, chest pain, cough or hemoptysis. Cardiovascular No anginal chest pain, irregular heart beat, tachycardia, palpitations or orthopnea. Gastrointestinal No nausea, vomiting, diarrhea, constipation, cramping, dysphagia, reflux, heartburn, GI bleeding, or early satiety. No change in bowel habits. Genitourinary (M) No hematuria, dysuria, increased frequency, urgency, hesitancy or incontinence. Musculoskeletal No joint pain, swelling or redness. No decreased range of motion. Integumentary No chronic rashes, inflammation, ulcerations, pruritus, petechiae, purpura, ecchymoses, or skin changes. Neurologic No headache, blurred vision, and no areas of focal weakness or numbness. Normal gait. No sensory problems.    Psychiatric No insomnia, depression, esha or mood swings. No psychotropic drugs       Physical Examination:  Constitutional Sedation to maintain respiratory support   Head Normocephalic; no scars   Eyes Conjunctivae and sclerae are clear and without icterus. Pupils are reactive and equal.   ENMT Sinuses are nontender. No oral exudates, ulcers, masses, thrush or mucositis. Oropharynx clear. Tongue normal.   Neck Supple without masses or thyromegaly. No jugular venous distension. Hematologic/Lymphatic No petechiae or purpura. No tender or palpable lymph nodes in the cervical, supraclavicular, axillary or inguinal area. Respiratory Lungs are clear to auscultation without rhonchi or wheezing. Cardiovascular Regular rate and rhythm of heart without murmurs, gallops or rubs. Chest / Line Site Chest is symmetric with no chest wall deformities. Abdomen Non-tender, non-distended, no masses, ascites or hepatosplenomegaly. Good bowel sounds. No guarding or rebound tenderness. No pulsatile masses. Musculoskeletal No tenderness or swelling, normal range of motion without obvious weakness. Extremities No visible deformities, no cyanosis, clubbing or edema. Skin No rashes, scars, or lesions suggestive of malignancy. No petechiae, purpura, or ecchymoses. No excoriations. Neurologic No sensory or motor deficits, normal cerebellar function, normal gait, cranial nerves intact. Psychiatric Alert and oriented times three. Coherent speech. Verbalizes understanding of our discussions today.        Labs:  Recent Results (from the past 24 hour(s))   METABOLIC PANEL, BASIC    Collection Time: 02/07/22  4:05 PM   Result Value Ref Range    Sodium 143 136 - 145 mmol/L    Potassium 3.4 (L) 3.5 - 5.1 mmol/L    Chloride 106 97 - 108 mmol/L    CO2 30 21 - 32 mmol/L    Anion gap 7 5 - 15 mmol/L    Glucose 89 65 - 100 mg/dL    BUN 36 (H) 6 - 20 mg/dL    Creatinine 0.85 0.55 - 1.02 mg/dL    BUN/Creatinine ratio 42 (H) 12 - 20      GFR est AA >60 >60 ml/min/1.73m2    GFR est non-AA >60 >60 ml/min/1.73m2    Calcium 8.0 (L) 8.5 - 10.1 mg/dL   MAGNESIUM    Collection Time: 02/07/22  4:05 PM   Result Value Ref Range    Magnesium 2.0 1.6 - 2.4 mg/dL   CBC WITH MANUAL DIFF    Collection Time: 02/07/22  4:05 PM   Result Value Ref Range    WBC 3.0 (L) 3.6 - 11.0 K/uL    RBC 2.68 (L) 3.80 - 5.20 M/uL    HGB 8.3 (L) 11.5 - 16.0 g/dL    HCT 26.5 (L) 35.0 - 47.0 %    MCV 98.9 80.0 - 99.0 FL    MCH 31.0 26.0 - 34.0 PG    MCHC 31.3 30.0 - 36.5 g/dL    RDW 18.5 (H) 11.5 - 14.5 %    PLATELET 580 796 - 342 K/uL    MPV 11.1 8.9 - 12.9 FL    NRBC 1.3 (H) 0.0  WBC    ABSOLUTE NRBC 0.04 (H) 0.00 - 0.01 K/uL    NEUTROPHILS 30 (L) 32 - 75 %    BAND NEUTROPHILS 5 0 - 6 %    LYMPHOCYTES 27 12 - 49 %    MONOCYTES 28 (H) 5 - 13 %    EOSINOPHILS 0 0 - 7 %    BASOPHILS 0 0 - 1 %    METAMYELOCYTES 5 (H) 0 %    MYELOCYTES 5 (H) 0 %    PROMYELOCYTES 0 0 %    BLASTS 0 0 %    OTHER CELL 0 %    IMMATURE GRANULOCYTES 0 %    ABS. NEUTROPHILS 1.1 (L) 1.8 - 8.0 K/UL    ABS. LYMPHOCYTES 0.8 0.8 - 3.5 K/UL    ABS. MONOCYTES 0.8 0.0 - 1.0 K/UL    ABS. EOSINOPHILS 0.0 0.0 - 0.4 K/UL    ABS. BASOPHILS 0.0 0.0 - 0.1 K/UL    ABS. IMM.  GRANS. 0.0 K/UL    DF Manual      RBC COMMENTS Anisocytosis  1+        RBC COMMENTS Macrocytosis  1+        DIFFERENTIAL Manual Differenctial Ordered     CBC W/O DIFF    Collection Time: 02/08/22  9:05 AM   Result Value Ref Range    WBC 7.7 3.6 - 11.0 K/uL    RBC 2.69 (L) 3.80 - 5.20 M/uL    HGB 8.4 (L) 11.5 - 16.0 g/dL    HCT 26.5 (L) 35.0 - 47.0 %    MCV 98.5 80.0 - 99.0 FL    MCH 31.2 26.0 - 34.0 PG    MCHC 31.7 30.0 - 36.5 g/dL    RDW 18.6 (H) 11.5 - 14.5 %    PLATELET 854 248 - 787 K/uL    MPV 12.3 8.9 - 12.9 FL    NRBC 1.0 (H) 0.0  WBC    ABSOLUTE NRBC 0.08 (H) 0.00 - 5.59 K/uL   METABOLIC PANEL, BASIC    Collection Time: 02/08/22  9:05 AM   Result Value Ref Range    Sodium 143 136 - 145 mmol/L    Potassium 3.6 3.5 - 5.1 mmol/L    Chloride 105 97 - 108 mmol/L    CO2 30 21 - 32 mmol/L    Anion gap 8 5 - 15 mmol/L    Glucose 59 (L) 65 - 100 mg/dL    BUN 38 (H) 6 - 20 mg/dL    Creatinine 0.89 0.55 - 1.02 mg/dL    BUN/Creatinine ratio 43 (H) 12 - 20      GFR est AA >60 >60 ml/min/1.73m2    GFR est non-AA >60 >60 ml/min/1.73m2    Calcium 7.9 (L) 8.5 - 10.1 mg/dL       Imaging:      Assessment and Plan:   Leukopenia neutropenia:Severe. Most likely due to meropenem. R/o due to amiadarone    -CBC today with good response to granix  -Diff on CBC pending; but will hold granix starting now     Normocytic hypochromic anemia: Patient iron studies normal .check ferritin . Hb improving.     Constipation:Mangement per primary team.Better. D/w pt nurse.     Folate defficiency:Continue folic acid 1 mg every day.      Pulmonary emboli  and DVT:continue eliquis. Monitor for bleeding closely.      Lung abscess:S/p Meropenem. ID following. Better. D/w ID.  Susana Barth  H/o GI bleeding:no active bleeding clinically.     Ok to release once medically stable

## 2022-02-08 NOTE — PROGRESS NOTES
Infectious Diseases Progress Note  Alex Kohler MD  [phone]    Date:2022       Room:[facility]  Patient Alysia Boyd     YOB: 1940     Age:81 y.o. 81F with past medical history of hypertension hyperlipidemia CVA. Reportedly positive for coronavirus the first week of 2021.     22 presents to [facility] with altered mental status. During the ED course was found to have an infiltrate on chest imaging, and started on broad spectrum antibiotics. Admitted to hospital and improved during the hospital course with meropenem. Hospital course further complicated by pulmonary embolism. 22 discharged from hospital on oral anticoagulation.     22 returns to hospital for acute hypoxic respiratory failure. CT reveals new development of Cavitary lesion of the inferior lateral right middle lobe. Admitted to hospital and I have been asked to see her in consultation.     1/10/22 underwent EGD with Dr Mary Grace Cooley:   Large duodenal bulb ulcer - injected and cauterized  Hiatal hernia  Recommendations:   PPI continuous infusion  NPO, IVF     For the cavitary pneumonia I had her on empiric meropenem with an anticipated stop date of 22.  22 discharged from [facility]     22 presents to [facility] because of lower abdominal pains. Reports constipation for the past few days. No improvement with enemas at home. Admitted to hospital for stabilization and further workup of her condition. Subjective    Subjective:  Symptoms:  Stable. Review of Systems   All other systems reviewed and are negative.     Objective         Vitals Last 24 Hours:  TEMPERATURE:  Temp  Av.7 °F (36.5 °C)  Min: 97.5 °F (36.4 °C)  Max: 98 °F (36.7 °C)  RESPIRATIONS RANGE: Resp  Av.2  Min: 18  Max: 22  PULSE OXIMETRY RANGE: SpO2  Av.5 %  Min: 92 %  Max: 98 %  PULSE RANGE: Pulse  Av.8  Min: 60  Max: 100  BLOOD PRESSURE RANGE: Systolic (09XTH), XFQ:79 , Min:86 , QWI:188   ; Diastolic (24hrs), Av, Min:56, Max:70    I/O (24Hr): No intake or output data in the 24 hours ending 22 1657  Objective:  General Appearance:  Comfortable. Vital signs: (most recent): Blood pressure 94/65, pulse 80, temperature 97.5 °F (36.4 °C), resp. rate 18, height 5' 4\" (1.626 m), weight 46 kg (101 lb 6.6 oz), SpO2 94 %. Vital signs are normal.    HEENT: Normal HEENT exam.    Lungs:  Normal effort and normal respiratory rate. Heart: Normal rate. Abdomen: Abdomen is soft. Neurological: Patient is alert. Skin:  Warm and dry. Labs/Imaging/Diagnostics    Labs:  CBC:  Recent Labs     22  0922  1605 22  1259   WBC 7.7 3.0* 1.4*   RBC 2.69* 2.68* 2.73*   HGB 8.4* 8.3* 8.4*   HCT 26.5* 26.5* 26.8*   MCV 98.5 98.9 98.2   RDW 18.6* 18.5* 18.5*    240 238     CHEMISTRIES:  Recent Labs     22  0922  1605 22  1259    143 141   K 3.6 3.4* 3.6    106 105   CO2 30 30 31   BUN 38* 36* 32*   CA 7.9* 8.0* 8.1*   MG 2.0 2.0 1.9   PT/INR:  No results for input(s): INR, INREXT, INREXT in the last 72 hours. No lab exists for component: PROTIME  APTT:  No results for input(s): APTT in the last 72 hours. LIVER PROFILE:  No results for input(s): AST, ALT in the last 72 hours. No lab exists for component: Jen Hemp, ALKPHOS  Lab Results   Component Value Date/Time    ALT (SGPT) 73 2022 07:21 AM    AST (SGOT) 57 (H) 2022 07:21 AM    Alk. phosphatase 97 2022 07:21 AM    Bilirubin, total 2.0 (H) 2022 07:21 AM       Imaging Last 24 Hours:  No results found.   Assessment//Plan   Principal Problem:    Acute respiratory failure with hypoxia ([address] (2022)    Active Problems:    Pulmonary emboli ([address] (2022)      Lung abscess ([address] (2022)      Deep vein thrombosis (DVT) of lower extremity ([address] (2022)      Atrial fibrillation ([address] (2022)      Constipation (2022)      GIB (gastrointestinal bleeding) (1/29/2022)      Overview: Recent      Chronic gastric ulcer (1/29/2022)      Overview: Recent      COVID-19 (1/29/2022)      Overview: Dec 2021      Anemia (1/29/2022)      Hypokalemia (1/29/2022)      Bilateral pleural effusion (1/29/2022)      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 Reportedly positive for coronavirus the first week of december 2021.     1/2/22 presents to [facility] with altered mental status. During the ED course was found to have an infiltrate on chest imaging, and started on broad spectrum antibiotics. Admitted to hospital and improved during the hospital course with meropenem. Hospital course further complicated by pulmonary embolism. 1/6/22 discharged from hospital on oral anticoagulation.     1/7/22 returns to hospital for acute hypoxic respiratory failure. CT reveals new development of Cavitary lesion of the inferior lateral right middle lobe. Admitted to hospital and I have been asked to see her in consultation.     1/10/22 underwent EGD with Dr Evans Carter:   Large duodenal bulb ulcer - injected and cauterized  Hiatal hernia  Recommendations:   PPI continuous infusion  NPO, IVF     For the cavitary pneumonia I had her on empiric meropenem with an anticipated stop date of 2/4/22.  1/24/22 discharged from [facility]     1/29/22 presents to [facility] because of lower abdominal pains. Reports constipation for the past few days. No improvement with enemas at home.  Admitted to hospital for stabilization and further workup of her condition.     MICROBIOLOGY     1/2/22              Covid 19          Positive  1/2/22              Blood               Negative  1/3/22              Urine                Negative  1/7/22              Blood               Negative    1/29/22            Covid 19          Negative  1/29/22 Blood               Negative     ASSESSMENT AND RECOMMENDATIONS     1) Pneumonia with development in the setting of SARS-CoV-2 coronaviral respiratory syndrome. Further complicated by new development of right sided cavitary lesion, improved with a long course of meropenem.                 Repeat CT chest shows improvement in her pulmonary condition              Meropenem iv completed 2/1/22     May remove picc once stable for discharge   No outpatient antibiotics anticipated    2) Development of neutropenia during this hospital stay.      Improved with Granix given during the hospital course      Electronically signed by Panda Brown MD on 2/8/2022 at 12:17 PM

## 2022-02-08 NOTE — PROGRESS NOTES
OCCUPATIONAL THERAPY TREATMENT  Patient: Heike Carter (32 y.o. female)  Date: 2/8/2022  Diagnosis: Acute respiratory failure with hypoxia ([facility] [J96.01]  Pleural effusion [J90]  Lung abscess ([facility] [J85.2] Acute respiratory failure with hypoxia [facility])      Precautions: Fall  Chart, occupational therapy assessment, plan of care, and goals were reviewed. ASSESSMENT  Patient continues with skilled OT services and is progressing towards goals. Upon NG/PTA arrival, pt semi supine in bed with daughter in room and agreeable to tx session. Pt completed bed mobility with ModA x2 supine>sit and ModA x2 scooting to EOB. Pt completed sit>stand from EOB with Mod/MaxA x2 and bed>chair transfer with MaxA x2. Pt required max cueing for hand placement on RW, for sequencing, and not to sit prior to being at chair. Pt noted with poor sitting balance and trunk control at EOB and in standing. Pt noted to require tactile and manual cueing ranging from Min-MaxA for maintaining balance while sitting at EOB and standing. Pt completed seated UE and LE therex while sitting in recliner (see chart below for UE therex). Pt noted with decreased command following and required AAROM at time for completion of therex. Pt left sitting in recliner with daughter in room. Educated daughter on pt completion of therex throughout day and HEP. Will continue to follow pt throughout remainder of stay and progress towards OT goals. Recommending SNF at discharge when medically appropriate. Other factors to consider for discharge: family support, DME, time since onset, severity of deficits          PLAN :  Patient continues to benefit from skilled intervention to address the above impairments. Continue treatment per established plan of care. to address goals.     Recommendation for discharge: (in order for the patient to meet his/her long term goals)  Krishan Driscoll    This discharge recommendation:  Has been made in collaboration with the attending provider and/or case management    IF patient discharges home will need the following DME: TBD       SUBJECTIVE:   Patient stated [place] will do whatever you think is best.    OBJECTIVE DATA SUMMARY:   Cognitive/Behavioral Status:  Neurologic State: Alert;Confused  Orientation Level: Oriented to person  Cognition: Decreased command following    Functional Mobility and Transfers for ADLs:  Bed Mobility:  Supine to Sit: Moderate assistance;Assist x2  Scooting: Moderate assistance;Assist x2    Transfers:  Sit to Stand: Moderate assistance;Maximum assistance;Assist x2     Bed to Chair: Maximum assistance;Assist x2    Balance:  Sitting: Impaired; With support  Sitting - Static: Poor (constant support)  Sitting - Dynamic: Poor (constant support)  Standing: Impaired; With support  Standing - Static: Constant support;Poor  Standing - Dynamic : Constant support;Poor    Therapeutic Exercises:   Exercise Sets Reps AROM AAROM PROM Self PROM Comments   Shoulder flex/ext 1 10 [x] [x] [] []      Pain:  Pain reported in stomach and back, no formal rating given. 5-6/10 via FACES scale. Activity Tolerance:   Fair and requires rest breaks  Please refer to the flowsheet for vital signs taken during this treatment. After treatment patient left in no apparent distress:   Sitting in chair, Call bell within reach, and Caregiver / family present    COMMUNICATION/COLLABORATION:   The patients plan of care was discussed with: Physical therapy assistant and Registered nurse. [place] with PT for increased safety for pt and clinician.     HERNANDEZ Solano  Time Calculation: 28 mins    Problem: Self Care Deficits Care Plan (Adult)  Goal: *Acute Goals and Plan of Care (Insert Text)  Description: Pt will be Mod I sup <> sit in prep for EOB ADLs  Pt will be Mod I grooming standing sink side LRAD  Pt will be Mod I LE dressing sitting EOB/long sit  Pt will be Mod I sit <>  prep for toileting LRAD  Pt will be Mod I toileting/toilet transfer/cloth mgmt LRAD  Pt will be IND following UE HEP in prep for self care tasks    Outcome: Progressing Towards Goal

## 2022-02-08 NOTE — PROGRESS NOTES
CM spoke with patient's daughter, Ms. Mikhail Khan, regarding DCP, patient continues to be recc for SNF, however family reports that they continue to wish to bring her home with [facility], patient accepted with [facility], family able to provide 24/7 care. Documentation and order for wheelchair has been received by attending, sent to [facility], awaiting approval.    CM notified Ms. Mikhail Khan that patient is likely to d/c tomorrow, she verbalized understanding and stated she would need medical transport, CM explained that we would attempt to get a ambulance however it has been extremely difficult to obtain insurance provided ambulances at this time. CM continues to follow, current DCP is to return home with [facility] and a wheelchair.

## 2022-02-08 NOTE — PROGRESS NOTES
Problem: Pressure Injury - Risk of  Goal: *Prevention of pressure injury  Description: Document Zeyad Scale and appropriate interventions in the flowsheet. Outcome: Progressing Towards Goal  Note: Pressure Injury Interventions:  Sensory Interventions: Assess changes in LOC,Float heels,Keep linens dry and wrinkle-free,Minimize linen layers    Moisture Interventions: Absorbent underpads,Minimize layers    Activity Interventions: Pressure redistribution bed/mattress(bed type),PT/OT evaluation    Mobility Interventions: Float heels,Pressure redistribution bed/mattress (bed type)    Nutrition Interventions: Offer support with meals,snacks and hydration    Friction and Shear Interventions: HOB 30 degrees or less,Minimize layers                Problem: Patient Education: Go to Patient Education Activity  Goal: Patient/Family Education  Outcome: Progressing Towards Goal     Problem: Falls - Risk of  Goal: *Absence of Falls  Description: Document Esther Fall Risk and appropriate interventions in the flowsheet.   Outcome: Progressing Towards Goal  Note: Fall Risk Interventions:  Mobility Interventions: Bed/chair exit alarm,Patient to call before getting OOB    Mentation Interventions: Bed/chair exit alarm,Adequate sleep, hydration, pain control    Medication Interventions: Bed/chair exit alarm,Patient to call before getting OOB    Elimination Interventions: Bed/chair exit alarm,Call light in reach,Patient to call for help with toileting needs              Problem: Patient Education: Go to Patient Education Activity  Goal: Patient/Family Education  Outcome: Progressing Towards Goal

## 2022-02-09 LAB
BASOPHILS # BLD: 0 K/UL (ref 0–0.1)
BASOPHILS NFR BLD: 0 % (ref 0–1)
BLASTS NFR BLD MANUAL: 0 %
DIFFERENTIAL METHOD BLD: ABNORMAL
EOSINOPHIL # BLD: 0 K/UL (ref 0–0.4)
EOSINOPHIL NFR BLD: 0 % (ref 0–7)
ERYTHROCYTE [DISTWIDTH] IN BLOOD BY AUTOMATED COUNT: 18.6 % (ref 11.5–14.5)
HCT VFR BLD AUTO: 23.4 % (ref 35–47)
HGB BLD-MCNC: 7.3 G/DL (ref 11.5–16)
IMM GRANULOCYTES # BLD AUTO: 0 K/UL
IMM GRANULOCYTES NFR BLD AUTO: 0 %
LYMPHOCYTES # BLD: 1.3 K/UL (ref 0.8–3.5)
LYMPHOCYTES NFR BLD: 5 % (ref 12–49)
MANUAL DIFFERENTIAL PERFORMED BLD QL: ABNORMAL
MCH RBC QN AUTO: 31.2 PG (ref 26–34)
MCHC RBC AUTO-ENTMCNC: 31.2 G/DL (ref 30–36.5)
MCV RBC AUTO: 100 FL (ref 80–99)
METAMYELOCYTES NFR BLD MANUAL: 0 %
MONOCYTES # BLD: 3.2 K/UL (ref 0–1)
MONOCYTES NFR BLD: 12 % (ref 5–13)
MYELOCYTES NFR BLD MANUAL: 0 %
NEUTS BAND NFR BLD MANUAL: 0 % (ref 0–6)
NEUTS SEG # BLD: 22.4 K/UL (ref 1.8–8)
NEUTS SEG NFR BLD: 83 % (ref 32–75)
NRBC # BLD: 0.45 K/UL (ref 0–0.01)
NRBC BLD-RTO: 1.7 PER 100 WBC
OTHER CELLS NFR BLD MANUAL: 0 %
PLATELET # BLD AUTO: 306 K/UL (ref 150–400)
PMV BLD AUTO: 11.2 FL (ref 8.9–12.9)
PROMYELOCYTES NFR BLD MANUAL: 0 %
RBC # BLD AUTO: 2.34 M/UL (ref 3.8–5.2)
RBC MORPH BLD: ABNORMAL
WBC # BLD AUTO: 26.9 K/UL (ref 3.6–11)

## 2022-02-09 PROCEDURE — 74011250637 HC RX REV CODE- 250/637: Performed by: NURSE PRACTITIONER

## 2022-02-09 PROCEDURE — 36415 COLL VENOUS BLD VENIPUNCTURE: CPT

## 2022-02-09 PROCEDURE — 74011000250 HC RX REV CODE- 250: Performed by: INTERNAL MEDICINE

## 2022-02-09 PROCEDURE — 74011250636 HC RX REV CODE- 250/636: Performed by: INTERNAL MEDICINE

## 2022-02-09 PROCEDURE — 74011250637 HC RX REV CODE- 250/637: Performed by: STUDENT IN AN ORGANIZED HEALTH CARE EDUCATION/TRAINING PROGRAM

## 2022-02-09 PROCEDURE — 74011250637 HC RX REV CODE- 250/637: Performed by: INTERNAL MEDICINE

## 2022-02-09 PROCEDURE — 94760 N-INVAS EAR/PLS OXIMETRY 1: CPT

## 2022-02-09 PROCEDURE — 97530 THERAPEUTIC ACTIVITIES: CPT

## 2022-02-09 PROCEDURE — 94761 N-INVAS EAR/PLS OXIMETRY MLT: CPT

## 2022-02-09 PROCEDURE — 85027 COMPLETE CBC AUTOMATED: CPT

## 2022-02-09 PROCEDURE — 94640 AIRWAY INHALATION TREATMENT: CPT

## 2022-02-09 PROCEDURE — 77010033678 HC OXYGEN DAILY

## 2022-02-09 PROCEDURE — 65270000029 HC RM PRIVATE

## 2022-02-09 RX ORDER — GUAIFENESIN 600 MG/1
600 TABLET, EXTENDED RELEASE ORAL EVERY 12 HOURS
Qty: 14 TABLET | Refills: 0 | Status: SHIPPED | OUTPATIENT
Start: 2022-02-09 | End: 2022-02-16

## 2022-02-09 RX ORDER — MIDODRINE HYDROCHLORIDE 5 MG/1
5 TABLET ORAL
Qty: 90 TABLET | Refills: 0 | Status: SHIPPED | OUTPATIENT
Start: 2022-02-09 | End: 2022-03-11

## 2022-02-09 RX ORDER — AMIODARONE HYDROCHLORIDE 200 MG/1
200 TABLET ORAL DAILY
Qty: 30 TABLET | Refills: 0 | Status: SHIPPED | OUTPATIENT
Start: 2022-02-10 | End: 2022-03-12

## 2022-02-09 RX ADMIN — IPRATROPIUM BROMIDE AND ALBUTEROL SULFATE 3 ML: .5; 2.5 SOLUTION RESPIRATORY (INHALATION) at 08:00

## 2022-02-09 RX ADMIN — SODIUM CHLORIDE, PRESERVATIVE FREE 10 ML: 5 INJECTION INTRAVENOUS at 15:01

## 2022-02-09 RX ADMIN — FOLIC ACID 1 MG: 1 TABLET ORAL at 08:56

## 2022-02-09 RX ADMIN — MIDODRINE HYDROCHLORIDE 5 MG: 5 TABLET ORAL at 08:58

## 2022-02-09 RX ADMIN — IPRATROPIUM BROMIDE AND ALBUTEROL SULFATE 3 ML: .5; 2.5 SOLUTION RESPIRATORY (INHALATION) at 20:47

## 2022-02-09 RX ADMIN — SODIUM CHLORIDE, PRESERVATIVE FREE 10 ML: 5 INJECTION INTRAVENOUS at 05:03

## 2022-02-09 RX ADMIN — SODIUM CHLORIDE, PRESERVATIVE FREE 10 ML: 5 INJECTION INTRAVENOUS at 21:36

## 2022-02-09 RX ADMIN — IPRATROPIUM BROMIDE AND ALBUTEROL SULFATE 3 ML: .5; 2.5 SOLUTION RESPIRATORY (INHALATION) at 14:00

## 2022-02-09 RX ADMIN — SODIUM CHLORIDE 250 ML: 9 INJECTION, SOLUTION INTRAVENOUS at 13:29

## 2022-02-09 RX ADMIN — AMIODARONE HYDROCHLORIDE 200 MG: 200 TABLET ORAL at 08:58

## 2022-02-09 RX ADMIN — MIDODRINE HYDROCHLORIDE 5 MG: 5 TABLET ORAL at 16:03

## 2022-02-09 RX ADMIN — GUAIFENESIN 600 MG: 600 TABLET, EXTENDED RELEASE ORAL at 08:56

## 2022-02-09 RX ADMIN — GUAIFENESIN 600 MG: 600 TABLET, EXTENDED RELEASE ORAL at 21:36

## 2022-02-09 RX ADMIN — APIXABAN 2.5 MG: 2.5 TABLET, FILM COATED ORAL at 21:36

## 2022-02-09 RX ADMIN — SODIUM CHLORIDE 250 ML: 9 INJECTION, SOLUTION INTRAVENOUS at 15:18

## 2022-02-09 RX ADMIN — POLYETHYLENE GLYCOL 3350 17 G: 17 POWDER, FOR SOLUTION ORAL at 08:56

## 2022-02-09 RX ADMIN — APIXABAN 2.5 MG: 2.5 TABLET, FILM COATED ORAL at 08:56

## 2022-02-09 NOTE — PROGRESS NOTES
Patient is clear to d/c from  to home with [facility], accepted with [facility]. Iliana Berman will provide wheelchair, to be delivered to patient's home. CM spoke with patient's daughter, Ms. Cantu How, she is agreeable to wheelchair delivered to home. Medicare pt has received, reviewed, and signed 2nd IM letter informing them of their right to appeal the discharge. Signed copied has been placed on pt bedside chart.

## 2022-02-09 NOTE — DISCHARGE SUMMARY
Physician Discharge Summary     Patient ID:    Michele Kruger  [id number]  71 y.o.  1940    Admit date: 1/29/2022    Discharge date : 2/9/2022    Chronic Diagnoses:    Problem List as of 2/9/2022 Date Reviewed: 1/29/2022          Codes Class Noted - Resolved    Pulmonary emboli ([facility] ICD-10-CM: I26.99  ICD-9-CM: 415.19  1/29/2022 - Present        * (Principal) Acute respiratory failure with hypoxia ([facility] ICD-10-CM: J96.01  ICD-9-CM: 518.81  1/29/2022 - Present        Lung abscess ([facility] ICD-10-CM: J85.2  ICD-9-CM: 513.0  1/29/2022 - Present        Deep vein thrombosis (DVT) of lower extremity ([facility] ICD-10-CM: I82.409  ICD-9-CM: 453.40  1/29/2022 - Present        Atrial fibrillation ([facility] ICD-10-CM: I48.91  ICD-9-CM: 427.31  1/29/2022 - Present        Constipation ICD-10-CM: K59.00  ICD-9-CM: 564.00  1/29/2022 - Present        GIB (gastrointestinal bleeding) (Chronic) ICD-10-CM: K92.2  ICD-9-CM: 578.9  1/29/2022 - Present    Overview Signed 1/29/2022  5:04 PM by Georgina Staples MD     Recent             Chronic gastric ulcer (Chronic) ICD-10-CM: K25.7  ICD-9-CM: 531.70  1/29/2022 - Present    Overview Signed 1/29/2022  5:05 PM by Georgina Staples MD     Recent             COVID-19 (Chronic) ICD-10-CM: U07.1  ICD-9-CM: 079.89  1/29/2022 - Present    Overview Signed 1/29/2022  5:05 PM by Georgina Staples MD     Dec 2021             Anemia ICD-10-CM: D64.9  ICD-9-CM: 285.9  1/29/2022 - Present        Hypokalemia ICD-10-CM: E87.6  ICD-9-CM: 276.8  1/29/2022 - Present        Bilateral pleural effusion ICD-10-CM: J90  ICD-9-CM: 511.9  1/29/2022 - Present        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 Improved  Chronic debilitation  Leukopenia/neutropenia. Improved  Atrial fibrillation now on Eliquis and amiodarone    Reason for Hospitalization:        Hospital Course:   Patient is an 69-year-old female with a very complex medical history in recent months.  She was admitted for COVID-19 in December 2021 and then hospitalized at [facility] on 1/2/2022 with altered mental status. Katty Flores was treated with meropenem.  That hospital stay was complicated by pulmonary embolism.  She was discharged on 1/6 on oral anticoagulation.     On 1/7 admitted to [facility] for hypoxic respiratory failure.  CT showed new development of cavitary lesion lateral right middle lobe.  She was seen by ID and started back on IV Merrem with an anticipated stop date of 2/4.  During that hospital stay she underwent EGD which showed a large duodenal bulb ulcer. Katty Flores was discharged from [facility] on 1/24     Patient then presents to our facility on 1/29 with abdominal pain and constipation     Patient has been seen by multiple consultants this hospital stay including pulmonology, cardiology, ID and hematology, the latter for leukopenia and neutropenia. Christiano Michael was concern that this was related to [address] [place] [address] Street which was discontinued. Patient has shown clinical improvement over time. There has been increase in WBC count. No further need for home IV antibiotics. Recommended skilled care facility but family declined and will take her home with home health            Discharge Medications:   Current Discharge Medication List      START taking these medications    Details   amiodarone (CORDARONE) 200 mg tablet Take 1 Tablet by mouth daily for 30 days.   Qty: 30 Tablet, Refills: 0  Start date: 2/10/2022, End date: 3/12/2022      apixaban (ELIQUIS) 2.5 mg tablet Take 1 Tablet by mouth two (2) times a day for 30 days. Indications: blood clot in a deep vein of the extremities, treatment to prevent blood clots in chronic atrial fibrillation, a clot in the lung  Qty: 60 Tablet, Refills: 0  Start date: 2022, End date: 3/11/2022      guaiFENesin ER (MUCINEX) 600 mg ER tablet Take 1 Tablet by mouth every twelve (12) hours for 7 days. Qty: 14 Tablet, Refills: 0  Start date: 2022, End date: 2022      midodrine (PROAMATINE) 5 mg tablet Take 1 Tablet by mouth three (3) times daily (with meals) for 30 days. Qty: 90 Tablet, Refills: 0  Start date: 2022, End date: 3/11/2022               Follow up Care:    Fracisco. Jan Diehl DO in 1-2 weeks. Please call to set up an appointment shortly after discharge. Diet:  Cardiac Diet    Disposition:  Home. Advanced Directive:   FULL    DNR      Discharge Exam:  Visit Vitals  /61 (BP 1 Location: Left upper arm, BP Patient Position: Supine; At rest)   Pulse (!) 109   Temp 97.7 °F (36.5 °C)   Resp 16   Ht 5' 4\" (1.626 m)   Wt 44.1 kg (97 lb 3.6 oz)   SpO2 93%   BMI 16.69 kg/m²    O2 Flow Rate (L/min): 1 l/min O2 Device: Nasal cannula    Temp (24hrs), Av.7 °F (36.5 °C), Min:97.5 °F (36.4 °C), Max:98 °F (36.7 °C)    No intake/output data recorded. No intake/output data recorded. General:  Alert, cooperative, no distress, appears stated age. Lungs:   Clear to auscultation bilaterally. Chest wall:  No tenderness or deformity. Heart:  Regular rate and rhythm, S1, S2 normal, no murmur, click, rub or gallop. Abdomen:   Soft, non-tender. Bowel sounds normal. No masses,  No organomegaly. Extremities: Extremities normal, atraumatic, no cyanosis or edema. Pulses: 2+ and symmetric all extremities. Skin: Skin color, texture, turgor normal. No rashes or lesions   Neurologic: CNII-XII intact.  No gross sensory or motor deficits         CONSULTATIONS: Pulmonary/Intensive care    Significant Diagnostic Studies:   2022: BUN 26 mg/dL (H; Ref range: 6 - 20 mg/dL); Calcium 7.6 mg/dL (L; Ref range: 8.5 - 10.1 mg/dL); CO2 36 mmol/L (H; Ref range: 21 - 32 mmol/L); Creatinine 0.53 mg/dL (L; Ref range: 0.55 - 1.02 mg/dL); Glucose 110 mg/dL (H; Ref range: 65 - 100 mg/dL); HCT 28.3 % (L; Ref range: 35.0 - 47.0 %); HGB 8.9 g/dL (L; Ref range: 11.5 - 16.0 g/dL); Potassium 3.0 mmol/L (L; Ref range: 3.5 - 5.1 mmol/L); Sodium 140 mmol/L (Ref range: 136 - 145 mmol/L)  1/30/2022: BUN 23 mg/dL (H; Ref range: 6 - 20 mg/dL); Calcium 7.3 mg/dL (L; Ref range: 8.5 - 10.1 mg/dL); CO2 36 mmol/L (H; Ref range: 21 - 32 mmol/L); Creatinine 0.49 mg/dL (L; Ref range: 0.55 - 1.02 mg/dL); Glucose 84 mg/dL (Ref range: 65 - 100 mg/dL); HCT 23.4 % (L; Ref range: 35.0 - 47.0 %); HCT 27.5 % (L; Ref range: 35.0 - 47.0 %); HGB 7.4 g/dL (L; Ref range: 11.5 - 16.0 g/dL); HGB 8.7 g/dL (L; Ref range: 11.5 - 16.0 g/dL); Potassium 3.0 mmol/L (L; Ref range: 3.5 - 5.1 mmol/L); Sodium 141 mmol/L (Ref range: 136 - 145 mmol/L)  Recent Labs     02/08/22 0905 02/07/22  1605   WBC 7.7 3.0*   HGB 8.4* 8.3*   HCT 26.5* 26.5*    240     Recent Labs     02/08/22  0905 02/07/22  1605 02/06/22  1259    143 141   K 3.6 3.4* 3.6    106 105   CO2 30 30 31   BUN 38* 36* 32*   CREA 0.89 0.85 0.79   GLU 59* 89 109*   CA 7.9* 8.0* 8.1*   MG 2.0 2.0 1.9     No results for input(s): ALT, AP, TBIL, TBILI, TP, ALB, GLOB, GGT, AML, LPSE in the last 72 hours. No lab exists for component: SGOT, GPT, AMYP, HLPSE  No results for input(s): INR, PTP, APTT, INREXT in the last 72 hours. Recent Labs     02/06/22  1259   FERR 1,042*      No results for input(s): PH, PCO2, PO2 in the last 72 hours. No results for input(s): CPK, CKMB in the last 72 hours.     No lab exists for component: TROPONINI  No results found for: GLUCPOC    Total Time: 35 minutes    Signed:  Lele Pierre MD  2/9/2022  10:40 AM

## 2022-02-09 NOTE — PROGRESS NOTES
PHYSICAL THERAPY TREATMENT  Patient: Dwight Quiles (6 y.o. female)  Date: 2/9/2022  Diagnosis: Acute respiratory failure with hypoxia ([facility] [address] [J96.01]  Pleural effusion [J90]  Lung abscess ([facility] [address] [J85.2] Acute respiratory failure with hypoxia [facility])       Precautions: Fall  Chart, physical therapy assessment, plan of care and goals were reviewed. ASSESSMENT  Patient continues with skilled PT services and is not progressing towards goals. Patient was supine in bed upon approach and agreed to therapy today. Patient was mod Ax2 for all bed mobility as therapists transferred patient from supine to seated EOB. Patient required constant support for sitting posture and reported feeling dizzy when seated upright. Patient requested to lay down due to dizziness which therapists assisted with mod Ax2. Once supine in bed patient performed supine TE ( see details below). Patient required max VC and TC for continued participation in [facility] [employer]. Patient left supine in bed with call bell within reach and all needs meets with family member present. Current Level of Function Impacting Discharge (mobility/balance): mod Ax2 for bed mobility, constant support during seated position. Other factors to consider for discharge: PLOF, assistance at home , reduced activity tolerance. PLAN :  Patient continues to benefit from skilled intervention to address the above impairments. Continue treatment per established plan of care. to address goals. Recommendation for discharge: (in order for the patient to meet his/her long term goals)  Krishan Driscoll    This discharge recommendation:  Has been made in collaboration with the attending provider and/or case management    IF patient discharges home will need the following DME: gait belt and rolling walker       SUBJECTIVE:   Patient stated i didn't sleep well last night.     OBJECTIVE DATA SUMMARY:   Critical Behavior:  Neurologic State: Alert,Confused  Orientation Level: Oriented to person  Cognition: Follows commands  Functional Mobility Training:  Bed Mobility:  Supine to Sit: Moderate assistance;Assist x2  Sit to Supine: Moderate assistance;Assist x2  Balance:  Sitting: Impaired; With support  Sitting - Static: Poor (constant support)  Sitting - Dynamic: Poor (constant support)  Therapeutic Exercises:   1x8 AP  1x8 supine marches/heel slides   Provided max VC and Tc to continue in TE. Pain Ratin/10    Activity Tolerance:   Poor and requires rest breaks  Please refer to the flowsheet for vital signs taken during this treatment. After treatment patient left in no apparent distress:   Supine in bed, Call bell within reach, Caregiver / family present, and Side rails x 3    COMMUNICATION/COLLABORATION:   The patients plan of care was discussed with: Registered nurse. Treatment occurred in conjunction with OT due to patient requiring Ax2 for all balancing and transferring. Problem: Mobility Impaired (Adult and Pediatric)  Goal: *Acute Goals and Plan of Care (Insert Text)  Description: Pt will be I with LE HEP in 7 days. Pt will perform bed mobility with min Ax1 in 7 days. Pt will perform transfers with min Ax1 in 7 days. Pt will amb 25-50 feet with LRAD safely with min Ax1 in 7 days.        Outcome: Not Progressing Towards Goal       Montana Hernandez PTA   Time Calculation: 25 mins

## 2022-02-09 NOTE — PROGRESS NOTES
OCCUPATIONAL THERAPY TREATMENT  Patient: Luis Fernando Kulkarni (71 y.o. female)  Date: 2/9/2022  Diagnosis: Acute respiratory failure with hypoxia ([facility] [address] [J96.01]  Pleural effusion [J90]  Lung abscess ([facility] [address] [J85.2] Acute respiratory failure with hypoxia [facility])       Precautions: Fall  Chart, occupational therapy assessment, plan of care, and goals were reviewed. ASSESSMENT  Patient continues with skilled OT services and is slowly progressing towards goals. Upon NG/PTA arrival, pt semi supine in bed and agreeable to tx session. Pt completed bed mobility with ModA x2 this date. Pt required constant support throughout sitting at EOB and noted with poor balance. Pt reported feeling dizzy while seated at EOB. Pt unable to maintain sitting balance at all this date compared to prior session. Pt requesting to lay down so returned to supine with [address] x2. Pt completed supine UE therex (see chart below). Pt required max verbal and tactile cueing for completion of all activities and therex. Pt left semi supine in bed with daughter in room. Will continue to follow pt throughout remainder of stay and progress towards OT goals. Recommending SNF at discharge when medically appropriate. Other factors to consider for discharge: family support, DME, time since onset, severity of deficits          PLAN :  Patient continues to benefit from skilled intervention to address the above impairments. Continue treatment per established plan of care. to address goals. Recommendation for discharge: (in order for the patient to meet his/her long term goals)  Krishan Driscoll    This discharge recommendation:  Has been made in collaboration with the attending provider and/or case management    IF patient discharges home will need the following DME: TBD       SUBJECTIVE:   Patient stated I will do whatever.     OBJECTIVE DATA SUMMARY:   Cognitive/Behavioral Status:  Neurologic State: Alert;Confused  Orientation Level: Oriented to person  Cognition: Follows commands    Functional Mobility and Transfers for ADLs:  Bed Mobility:  Rolling: Minimum assistance  Supine to Sit: Moderate assistance;Assist x2  Sit to Supine: Moderate assistance;Assist x2    Balance:  Sitting: Impaired; With support  Sitting - Static: Poor (constant support)  Sitting - Dynamic: Poor (constant support)    Therapeutic Exercises:   Exercise Sets Reps AROM AAROM PROM Self PROM Comments   Elbow flex/ext 1 10 [x] [] [] []      Pain:  0/10    Activity Tolerance:   Fair and requires rest breaks  Please refer to the flowsheet for vital signs taken during this treatment. After treatment patient left in no apparent distress:   Supine in bed, Call bell within reach, and Caregiver / family present    COMMUNICATION/COLLABORATION:   The patients plan of care was discussed with: Physical therapy assistant and Registered nurse. [place] with PT for increased safety for pt and clinician.     HERNANDEZ Solano  Time Calculation: 27 mins    Problem: Self Care Deficits Care Plan (Adult)  Goal: *Acute Goals and Plan of Care (Insert Text)  Description: Pt will be Mod I sup <> sit in prep for EOB ADLs  Pt will be Mod I grooming standing sink side LRAD  Pt will be Mod I LE dressing sitting EOB/long sit  Pt will be Mod I sit <>  prep for toileting LRAD  Pt will be Mod I toileting/toilet transfer/cloth mgmt LRAD  Pt will be IND following UE HEP in prep for self care tasks    Outcome: Progressing Towards Goal

## 2022-02-09 NOTE — PROGRESS NOTES
Dr. Kamar Bianchi paged about patient blood pressure post bolus.  [address] . [facility]

## 2022-02-09 NOTE — PROGRESS NOTES
Hematology Oncology Progress Note           Follow up for:  Chart notes reviewed since last visit. Patient Vitals for the past 24 hrs:   BP Temp Pulse Resp SpO2 Weight   02/09/22 1439 (!) 75/54 98.6 °F (37 °C) (!) 130 18 93 %    02/09/22 1103 (!) 73/52 97.5 °F (36.4 °C) (!) 120 18 97 %    02/09/22 0711 103/61 97.7 °F (36.5 °C) (!) 109 16 93 %    02/09/22 0402 94/63 98 °F (36.7 °C) (!) 104 18 95 % 44.1 kg (97 lb 3.6 oz)   02/08/22 2106 105/67 97.6 °F (36.4 °C) 91 18 96 %        Review of Systems:    Constitutional No fevers, chills, night sweats, excessive fatigue or weight loss. Allergic/Immunologic No recent allergic reactions   Eyes No significant visual difficulties. No diplopia. ENMT No problems with hearing, no sore throat, no sinus drainage. Endocrine No hot flashes or night sweats. No cold intolerance, polyuria, or polydipsia   Hematologic/Lymphatic No easy bruising or bleeding. The patient denies any tender or palpable lymph nodes   Breasts No abnormal masses of breast, nipple discharge or pain. Respiratory No dyspnea on exertion, orthopnea, chest pain, cough or hemoptysis. Cardiovascular No anginal chest pain, irregular heart beat, tachycardia, palpitations or orthopnea. Gastrointestinal No nausea, vomiting, diarrhea, constipation, cramping, dysphagia, reflux, heartburn, GI bleeding, or early satiety. No change in bowel habits. Genitourinary (M) No hematuria, dysuria, increased frequency, urgency, hesitancy or incontinence. Musculoskeletal No joint pain, swelling or redness. No decreased range of motion. Integumentary No chronic rashes, inflammation, ulcerations, pruritus, petechiae, purpura, ecchymoses, or skin changes. Neurologic No headache, blurred vision, and no areas of focal weakness or numbness. Normal gait. No sensory problems. Psychiatric No insomnia, depression, esha or mood swings.   No psychotropic drugs       Physical Examination:  Constitutional Sedation to maintain respiratory support   Head Normocephalic; no scars   Eyes Conjunctivae and sclerae are clear and without icterus. Pupils are reactive and equal.   ENMT Sinuses are nontender. No oral exudates, ulcers, masses, thrush or mucositis. Oropharynx clear. Tongue normal.   Neck Supple without masses or thyromegaly. No jugular venous distension. Hematologic/Lymphatic No petechiae or purpura. No tender or palpable lymph nodes in the cervical, supraclavicular, axillary or inguinal area. Respiratory Lungs are clear to auscultation without rhonchi or wheezing. Cardiovascular Regular rate and rhythm of heart without murmurs, gallops or rubs. Chest / Line Site Chest is symmetric with no chest wall deformities. Abdomen Non-tender, non-distended, no masses, ascites or hepatosplenomegaly. Good bowel sounds. No guarding or rebound tenderness. No pulsatile masses. Musculoskeletal No tenderness or swelling, normal range of motion without obvious weakness. Extremities No visible deformities, no cyanosis, clubbing or edema. Skin No rashes, scars, or lesions suggestive of malignancy. No petechiae, purpura, or ecchymoses. No excoriations. Neurologic No sensory or motor deficits, normal cerebellar function, normal gait, cranial nerves intact. Psychiatric Alert and oriented times three. Coherent speech. Verbalizes understanding of our discussions today.        Labs:  Recent Results (from the past 24 hour(s))   CBC WITH MANUAL DIFF    Collection Time: 02/09/22  1:20 PM   Result Value Ref Range    WBC 26.9 (H) 3.6 - 11.0 K/uL    RBC 2.34 (L) 3.80 - 5.20 M/uL    HGB 7.3 (L) 11.5 - 16.0 g/dL    HCT 23.4 (L) 35.0 - 47.0 %    .0 (H) 80.0 - 99.0 FL    MCH 31.2 26.0 - 34.0 PG    MCHC 31.2 30.0 - 36.5 g/dL    RDW 18.6 (H) 11.5 - 14.5 %    PLATELET 587 722 - 787 K/uL    MPV 11.2 8.9 - 12.9 FL    NRBC 1.7 (H) 0.0  WBC    ABSOLUTE NRBC 0.45 (H) 0.00 - 0.01 K/uL    NEUTROPHILS 83 (H) 32 - 75 %    BAND NEUTROPHILS 0 0 - 6 %    LYMPHOCYTES 5 (L) 12 - 49 %    MONOCYTES 12 5 - 13 %    EOSINOPHILS 0 0 - 7 %    BASOPHILS 0 0 - 1 %    METAMYELOCYTES 0 0 %    MYELOCYTES 0 0 %    PROMYELOCYTES 0 0 %    BLASTS 0 0 %    OTHER CELL 0 %    IMMATURE GRANULOCYTES 0 %    ABS. NEUTROPHILS 22.4 (H) 1.8 - 8.0 K/UL    ABS. LYMPHOCYTES 1.3 0.8 - 3.5 K/UL    ABS. MONOCYTES 3.2 (H) 0.0 - 1.0 K/UL    ABS. EOSINOPHILS 0.0 0.0 - 0.4 K/UL    ABS. BASOPHILS 0.0 0.0 - 0.1 K/UL    ABS. IMM. GRANS. 0.0 K/UL    DF Manual      RBC COMMENTS Anisocytosis  1+        DIFFERENTIAL Manual Differenctial Ordered         Imaging:      Assessment and Plan:   Leukopenia neutropenia:Severe. Most likely due to meropenem. R/o due to amiadarone     -CBC today with good response to granix  Wbc over correction will decline on its own     Normocytic hypochromic anemia: Patient iron studies normal .check ferritin . Hb improving.     Constipation:Mangement per primary team.Better. D/w pt nurse.     Folate defficiency:Continue folic acid 1 mg every day.      Pulmonary emboli  and DVT:continue eliquis. Monitor for bleeding closely.      Lung abscess:S/p Meropenem. ID following. Better. D/w ID.  [facility] [place]  H/o GI bleeding:no active bleeding clinically.     Ok to release once medically stable, will sign off, feel free to call with questions

## 2022-02-09 NOTE — PROGRESS NOTES
Infectious Diseases Progress Note  Alex Landers MD  [phone]    Date:2022       Room:[facility]  Patient Linda Barillas     YOB: 1940     Age:81 y.o. 81F with past medical history of hypertension hyperlipidemia CVA. Reportedly positive for coronavirus the first week of 2021.     22 presents to [facility] with altered mental status. During the ED course was found to have an infiltrate on chest imaging, and started on broad spectrum antibiotics. Admitted to hospital and improved during the hospital course with meropenem. Hospital course further complicated by pulmonary embolism. 22 discharged from hospital on oral anticoagulation.     22 returns to hospital for acute hypoxic respiratory failure. CT reveals new development of Cavitary lesion of the inferior lateral right middle lobe. Admitted to hospital and I have been asked to see her in consultation.     1/10/22 underwent EGD with Dr Alanis Coffman:   Large duodenal bulb ulcer - injected and cauterized  Hiatal hernia  Recommendations:   PPI continuous infusion  NPO, IVF     For the cavitary pneumonia I had her on empiric meropenem with an anticipated stop date of 22.  22 discharged from [facility]     22 presents to [facility] because of lower abdominal pains. Reports constipation for the past few days. No improvement with enemas at home. Admitted to hospital for stabilization and further workup of her condition. Subjective    Subjective:  Symptoms:  Stable. Review of Systems   All other systems reviewed and are negative.     Objective         Vitals Last 24 Hours:  TEMPERATURE:  Temp  Av.7 °F (36.5 °C)  Min: 97.5 °F (36.4 °C)  Max: 98 °F (36.7 °C)  RESPIRATIONS RANGE: Resp  Av.6  Min: 16  Max: 18  PULSE OXIMETRY RANGE: SpO2  Av %  Min: 93 %  Max: 97 %  PULSE RANGE: Pulse  Av.8  Min: 80  Max: 120  BLOOD PRESSURE RANGE: Systolic (18QIU), KPE:79 , Min:73 , CZC:115   ; Diastolic (24hrs), Av, Min:52, Max:67    I/O (24Hr): No intake or output data in the 24 hours ending 22 1438  Objective:  General Appearance:  Comfortable. Vital signs: (most recent): Blood pressure (!) 73/52, pulse (!) 120, temperature 97.5 °F (36.4 °C), resp. rate 18, height 5' 4\" (1.626 m), weight 44.1 kg (97 lb 3.6 oz), SpO2 97 %. Vital signs are normal.    HEENT: Normal HEENT exam.    Lungs:  Normal effort and normal respiratory rate. Heart: Normal rate. Abdomen: Abdomen is soft. Neurological: Patient is alert. Skin:  Warm and dry. Labs/Imaging/Diagnostics    Labs:  CBC:  Recent Labs     22  1320 22  0905 22  1605   WBC 26.9* 7.7 3.0*   RBC 2.34* 2.69* 2.68*   HGB 7.3* 8.4* 8.3*   HCT 23.4* 26.5* 26.5*   .0* 98.5 98.9   RDW 18.6* 18.6* 18.5*    268 240     CHEMISTRIES:  Recent Labs     22  0922  1605    143   K 3.6 3.4*    106   CO2 30 30   BUN 38* 36*   CA 7.9* 8.0*   MG 2.0 2.0   PT/INR:  No results for input(s): INR, INREXT, INREXT in the last 72 hours. No lab exists for component: PROTIME  APTT:  No results for input(s): APTT in the last 72 hours. LIVER PROFILE:  No results for input(s): AST, ALT in the last 72 hours. No lab exists for component: MORRIS Brown  Lab Results   Component Value Date/Time    ALT (SGPT) 73 2022 07:21 AM    AST (SGOT) 57 (H) 2022 07:21 AM    Alk. phosphatase 97 2022 07:21 AM    Bilirubin, total 2.0 (H) 2022 07:21 AM       Imaging Last 24 Hours:  No results found.   Assessment//Plan   Principal Problem:    Acute respiratory failure with hypoxia ([address] (2022)    Active Problems:    Pulmonary emboli ([address] (2022)      Lung abscess ([address] (2022)      Deep vein thrombosis (DVT) of lower extremity ([address] (2022)      Atrial fibrillation ([address] (2022)      Constipation (2022)      GIB (gastrointestinal bleeding) (2022)      Overview: Recent      Chronic gastric ulcer (1/29/2022)      Overview: Recent      COVID-19 (1/29/2022)      Overview: Dec 2021      Anemia (1/29/2022)      Hypokalemia (1/29/2022)      Bilateral pleural effusion (1/29/2022)      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 Reportedly positive for coronavirus the first week of december 2021.     1/2/22 presents to [facility] with altered mental status. During the ED course was found to have an infiltrate on chest imaging, and started on broad spectrum antibiotics. Admitted to hospital and improved during the hospital course with meropenem. Hospital course further complicated by pulmonary embolism. 1/6/22 discharged from hospital on oral anticoagulation.     1/7/22 returns to hospital for acute hypoxic respiratory failure. CT reveals new development of Cavitary lesion of the inferior lateral right middle lobe. Admitted to hospital and I have been asked to see her in consultation.     1/10/22 underwent EGD with Dr Hilary Rosas:   Large duodenal bulb ulcer - injected and cauterized  Hiatal hernia  Recommendations:   PPI continuous infusion  NPO, IVF     For the cavitary pneumonia I had her on empiric meropenem with an anticipated stop date of 2/4/22.  1/24/22 discharged from [facility]     1/29/22 presents to [facility] because of lower abdominal pains. Reports constipation for the past few days. No improvement with enemas at home.  Admitted to hospital for stabilization and further workup of her condition.     MICROBIOLOGY     1/2/22              Covid 19          Positive  1/2/22              Blood               Negative  1/3/22              Urine                Negative  1/7/22              Blood               Negative    1/29/22            Covid 19          Negative  1/29/22            Blood Negative     ASSESSMENT AND RECOMMENDATIONS     1) Pneumonia with development in the setting of SARS-CoV-2 coronaviral respiratory syndrome. Further complicated by new development of right sided cavitary lesion, improved with a long course of meropenem.                 Repeat CT chest shows improvement in her pulmonary condition              Meropenem iv completed 2/1/22     May remove picc once stable for discharge   No outpatient antibiotics anticipated    2) Development of neutropenia during this hospital stay.      Improved with Granix given during the hospital course      Electronically signed by Marily Snider MD on 2/9/2022 at 12:17 PM

## 2022-02-10 VITALS
OXYGEN SATURATION: 92 % | HEIGHT: 64 IN | WEIGHT: 95.24 LBS | SYSTOLIC BLOOD PRESSURE: 84 MMHG | BODY MASS INDEX: 16.26 KG/M2 | TEMPERATURE: 98.4 F | HEART RATE: 126 BPM | RESPIRATION RATE: 26 BRPM | DIASTOLIC BLOOD PRESSURE: 55 MMHG

## 2022-02-10 PROCEDURE — 74011000250 HC RX REV CODE- 250: Performed by: INTERNAL MEDICINE

## 2022-02-10 PROCEDURE — 74011250637 HC RX REV CODE- 250/637: Performed by: INTERNAL MEDICINE

## 2022-02-10 PROCEDURE — 94761 N-INVAS EAR/PLS OXIMETRY MLT: CPT

## 2022-02-10 PROCEDURE — 94640 AIRWAY INHALATION TREATMENT: CPT

## 2022-02-10 PROCEDURE — 77010033678 HC OXYGEN DAILY

## 2022-02-10 PROCEDURE — 74011250637 HC RX REV CODE- 250/637: Performed by: NURSE PRACTITIONER

## 2022-02-10 PROCEDURE — 94760 N-INVAS EAR/PLS OXIMETRY 1: CPT

## 2022-02-10 RX ADMIN — IPRATROPIUM BROMIDE AND ALBUTEROL SULFATE 3 ML: .5; 2.5 SOLUTION RESPIRATORY (INHALATION) at 13:35

## 2022-02-10 RX ADMIN — APIXABAN 2.5 MG: 2.5 TABLET, FILM COATED ORAL at 10:30

## 2022-02-10 RX ADMIN — GUAIFENESIN 600 MG: 600 TABLET, EXTENDED RELEASE ORAL at 10:30

## 2022-02-10 RX ADMIN — MIDODRINE HYDROCHLORIDE 5 MG: 5 TABLET ORAL at 10:30

## 2022-02-10 RX ADMIN — MIDODRINE HYDROCHLORIDE 5 MG: 5 TABLET ORAL at 15:51

## 2022-02-10 RX ADMIN — FOLIC ACID 1 MG: 1 TABLET ORAL at 10:30

## 2022-02-10 RX ADMIN — SODIUM CHLORIDE, PRESERVATIVE FREE 10 ML: 5 INJECTION INTRAVENOUS at 05:37

## 2022-02-10 RX ADMIN — IPRATROPIUM BROMIDE AND ALBUTEROL SULFATE 3 ML: .5; 2.5 SOLUTION RESPIRATORY (INHALATION) at 09:05

## 2022-02-10 RX ADMIN — AMIODARONE HYDROCHLORIDE 200 MG: 200 TABLET ORAL at 10:30

## 2022-02-10 RX ADMIN — SODIUM CHLORIDE, PRESERVATIVE FREE 10 ML: 5 INJECTION INTRAVENOUS at 16:17

## 2022-02-10 NOTE — PROGRESS NOTES
Infectious Diseases Progress Note  Alex Rollins MD  [phone]    Date:2/10/2022       Room:[facility]  Patient Glo Perales     YOB: 1940     Age:81 y.o. 81F with past medical history of hypertension hyperlipidemia CVA. Reportedly positive for coronavirus the first week of 2021.     22 presents to [facility] with altered mental status. During the ED course was found to have an infiltrate on chest imaging, and started on broad spectrum antibiotics. Admitted to hospital and improved during the hospital course with meropenem. Hospital course further complicated by pulmonary embolism. 22 discharged from hospital on oral anticoagulation.     22 returns to hospital for acute hypoxic respiratory failure. CT reveals new development of Cavitary lesion of the inferior lateral right middle lobe. Admitted to hospital and I have been asked to see her in consultation.     1/10/22 underwent EGD with Dr Balbir Arias:   Large duodenal bulb ulcer - injected and cauterized  Hiatal hernia  Recommendations:   PPI continuous infusion  NPO, IVF     For the cavitary pneumonia I had her on empiric meropenem with an anticipated stop date of 22.  22 discharged from [facility]     22 presents to [facility] because of lower abdominal pains. Reports constipation for the past few days. No improvement with enemas at home. Admitted to hospital for stabilization and further workup of her condition. Subjective    Subjective:  Symptoms:  Stable. Review of Systems   All other systems reviewed and are negative.     Objective         Vitals Last 24 Hours:  TEMPERATURE:  Temp  Av.1 °F (36.7 °C)  Min: 97.7 °F (36.5 °C)  Max: 98.4 °F (36.9 °C)  RESPIRATIONS RANGE: Resp  Av  Min: 18  Max: 26  PULSE OXIMETRY RANGE: SpO2  Av.8 %  Min: 92 %  Max: 98 %  PULSE RANGE: Pulse  Av  Min: 102  Max: 128  BLOOD PRESSURE RANGE: Systolic (23REL), JHJ:48 , Min:84 , ULP:159   ; Diastolic (24hrs), Av, Min:55, Max:68    I/O (24Hr): No intake or output data in the 24 hours ending 02/10/22 1441  Objective:  General Appearance:  Comfortable. Vital signs: (most recent): Blood pressure (!) 84/55, pulse (!) 126, temperature 98.4 °F (36.9 °C), resp. rate 26, height 5' 4\" (1.626 m), weight 43.2 kg (95 lb 3.8 oz), SpO2 92 %. Vital signs are normal.    HEENT: Normal HEENT exam.    Lungs:  Normal effort and normal respiratory rate. Heart: Normal rate. Abdomen: Abdomen is soft. Neurological: Patient is alert. Skin:  Warm and dry. Labs/Imaging/Diagnostics    Labs:  CBC:  Recent Labs     22  1320 22  0905 22  1605   WBC 26.9* 7.7 3.0*   RBC 2.34* 2.69* 2.68*   HGB 7.3* 8.4* 8.3*   HCT 23.4* 26.5* 26.5*   .0* 98.5 98.9   RDW 18.6* 18.6* 18.5*    268 240     CHEMISTRIES:  Recent Labs     22  0905 22  1605    143   K 3.6 3.4*    106   CO2 30 30   BUN 38* 36*   CA 7.9* 8.0*   MG 2.0 2.0   PT/INR:  No results for input(s): INR, INREXT, INREXT in the last 72 hours. No lab exists for component: PROTIME  APTT:  No results for input(s): APTT in the last 72 hours. LIVER PROFILE:  No results for input(s): AST, ALT in the last 72 hours. No lab exists for component: MORRIS Cee  Lab Results   Component Value Date/Time    ALT (SGPT) 73 2022 07:21 AM    AST (SGOT) 57 (H) 2022 07:21 AM    Alk. phosphatase 97 2022 07:21 AM    Bilirubin, total 2.0 (H) 2022 07:21 AM       Imaging Last 24 Hours:  No results found.   Assessment//Plan   Principal Problem:    Acute respiratory failure with hypoxia ([address] (2022)    Active Problems:    Pulmonary emboli ([address] (2022)      Lung abscess ([address] (2022)      Deep vein thrombosis (DVT) of lower extremity ([address] (2022)      Atrial fibrillation ([address] (2022)      Constipation (2022)      GIB (gastrointestinal bleeding) (2022)      Overview: Recent      Chronic gastric ulcer (1/29/2022)      Overview: Recent      COVID-19 (1/29/2022)      Overview: Dec 2021      Anemia (1/29/2022)      Hypokalemia (1/29/2022)      Bilateral pleural effusion (1/29/2022)      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 Reportedly positive for coronavirus the first week of december 2021.     1/2/22 presents to [facility] with altered mental status. During the ED course was found to have an infiltrate on chest imaging, and started on broad spectrum antibiotics. Admitted to hospital and improved during the hospital course with meropenem. Hospital course further complicated by pulmonary embolism. 1/6/22 discharged from hospital on oral anticoagulation.     1/7/22 returns to hospital for acute hypoxic respiratory failure. CT reveals new development of Cavitary lesion of the inferior lateral right middle lobe. Admitted to hospital and I have been asked to see her in consultation.     1/10/22 underwent EGD with Dr Puente Lexi:   Large duodenal bulb ulcer - injected and cauterized  Hiatal hernia  Recommendations:   PPI continuous infusion  NPO, IVF     For the cavitary pneumonia I had her on empiric meropenem with an anticipated stop date of 2/4/22.  1/24/22 discharged from [facility]     1/29/22 presents to [facility] because of lower abdominal pains. Reports constipation for the past few days. No improvement with enemas at home.  Admitted to hospital for stabilization and further workup of her condition.     MICROBIOLOGY     1/2/22              Covid 19          Positive  1/2/22              Blood               Negative  1/3/22              Urine                Negative  1/7/22              Blood               Negative    1/29/22            Covid 19          Negative  1/29/22            Blood Negative     ASSESSMENT AND RECOMMENDATIONS     1) Pneumonia with development in the setting of SARS-CoV-2 coronaviral respiratory syndrome. Further complicated by new development of right sided cavitary lesion, improved with a long course of meropenem.                 Repeat CT chest shows improvement in her pulmonary condition              Meropenem iv completed 2/1/22     May remove picc once stable for discharge   No outpatient antibiotics anticipated    2) Development of neutropenia during this hospital stay.      Improved with Granix given during the hospital course      Electronically signed by Katerina Aly MD on 2/10/2022 at 12:17 PM

## 2022-02-10 NOTE — DISCHARGE SUMMARY
Physician Discharge Summary     Patient ID:    Lizette Mcnair  [id number]  58 y.o.  1940    Admit date: 1/29/2022    Discharge date : 2/10/2022    Chronic Diagnoses:    Problem List as of 2/10/2022 Date Reviewed: 1/29/2022          Codes Class Noted - Resolved    Pulmonary emboli ([facility] ICD-10-CM: I26.99  ICD-9-CM: 415.19  1/29/2022 - Present        * (Principal) Acute respiratory failure with hypoxia ([facility] ICD-10-CM: J96.01  ICD-9-CM: 518.81  1/29/2022 - Present        Lung abscess ([facility] ICD-10-CM: J85.2  ICD-9-CM: 513.0  1/29/2022 - Present        Deep vein thrombosis (DVT) of lower extremity ([facility] ICD-10-CM: I82.409  ICD-9-CM: 453.40  1/29/2022 - Present        Atrial fibrillation ([facility] ICD-10-CM: I48.91  ICD-9-CM: 427.31  1/29/2022 - Present        Constipation ICD-10-CM: K59.00  ICD-9-CM: 564.00  1/29/2022 - Present        GIB (gastrointestinal bleeding) (Chronic) ICD-10-CM: K92.2  ICD-9-CM: 578.9  1/29/2022 - Present    Overview Signed 1/29/2022  5:04 PM by Star Mann MD     Recent             Chronic gastric ulcer (Chronic) ICD-10-CM: K25.7  ICD-9-CM: 531.70  1/29/2022 - Present    Overview Signed 1/29/2022  5:05 PM by Star Mann MD     Recent             COVID-19 (Chronic) ICD-10-CM: U07.1  ICD-9-CM: 079.89  1/29/2022 - Present    Overview Signed 1/29/2022  5:05 PM by Star Mann MD     Dec 2021             Anemia ICD-10-CM: D64.9  ICD-9-CM: 285.9  1/29/2022 - Present        Hypokalemia ICD-10-CM: E87.6  ICD-9-CM: 276.8  1/29/2022 - Present        Bilateral pleural effusion ICD-10-CM: J90  ICD-9-CM: 511.9  1/29/2022 - Present        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 Improved  Chronic debilitation  Leukopenia/neutropenia. Improved  Atrial fibrillation now on Eliquis and amiodarone  Leukocytosis due to granix  Reason for Hospitalization:        Hospital Course:   Patient is an 79-year-old female with a very complex medical history in recent months.  She was admitted for COVID-19 in December 2021 and then hospitalized at [facility] on 1/2/2022 with altered mental status. Cruzito Morel was treated with meropenem.  That hospital stay was complicated by pulmonary embolism.  She was discharged on 1/6 on oral anticoagulation.     On 1/7 admitted to [facility] for hypoxic respiratory failure.  CT showed new development of cavitary lesion lateral right middle lobe.  She was seen by ID and started back on IV Merrem with an anticipated stop date of 2/4.  During that hospital stay she underwent EGD which showed a large duodenal bulb ulcer. Cruzito Morel was discharged from [facility] on 1/24     Patient then presents to our facility on 1/29 with abdominal pain and constipation     Patient has been seen by multiple consultants this hospital stay including pulmonology, cardiology, ID and hematology, the latter for leukopenia and neutropenia. Johnny Monge was concern that this was related to [facility] which was discontinued. Patient has shown clinical improvement over time. There has been increase in WBC count. No further need for home IV antibiotics. Recommended skilled care facility but family declined and will take her home with home health  I met with daughter at bedside on day of discharge and went through hospital course. Patient is so debilitated from recent illness. May or may not improve in days ahead. Encourage family attempt rehab and if it fails,consider hospice care.           Discharge Medications:   Current Discharge Medication List      START taking these medications    Details   amiodarone (CORDARONE) 200 mg tablet Take 1 Tablet by mouth daily for 30 days. Qty: 30 Tablet, Refills: 0  Start date: 2/10/2022, End date: 3/12/2022      apixaban (ELIQUIS) 2.5 mg tablet Take 1 Tablet by mouth two (2) times a day for 30 days. Indications: blood clot in a deep vein of the extremities, treatment to prevent blood clots in chronic atrial fibrillation, a clot in the lung  Qty: 60 Tablet, Refills: 0  Start date: 2022, End date: 3/11/2022      guaiFENesin ER (MUCINEX) 600 mg ER tablet Take 1 Tablet by mouth every twelve (12) hours for 7 days. Qty: 14 Tablet, Refills: 0  Start date: 2022, End date: 2022      midodrine (PROAMATINE) 5 mg tablet Take 1 Tablet by mouth three (3) times daily (with meals) for 30 days. Qty: 90 Tablet, Refills: 0  Start date: 2022, End date: 3/11/2022               Follow up Care:    1. Kaye Diehl DO in 1-2 weeks. Please call to set up an appointment shortly after discharge. Diet:  Cardiac Diet    Disposition:  Home. Advanced Directive:   FULL    DNR      Discharge Exam:  Visit Vitals  BP 90/68 (BP 1 Location: Left upper arm, BP Patient Position: At rest;Lying left side)   Pulse (!) 102   Temp 97.7 °F (36.5 °C)   Resp 18   Ht 5' 4\" (1.626 m)   Wt 43.2 kg (95 lb 3.8 oz)   SpO2 98%   BMI 16.35 kg/m²    O2 Flow Rate (L/min): 1.5 l/min O2 Device: Nasal cannula    Temp (24hrs), Av.1 °F (36.7 °C), Min:97.7 °F (36.5 °C), Max:98.6 °F (37 °C)    No intake/output data recorded. No intake/output data recorded. General:  Alert. Frail appearing   Lungs:   Clear to auscultation bilaterally. Chest wall:  No tenderness or deformity. Heart:  Regular rate and rhythm, S1, S2 normal, no murmur, click, rub or gallop. Abdomen:   Soft, non-tender. Bowel sounds normal. No masses,  No organomegaly. Extremities: Extremities normal, atraumatic, no cyanosis or edema. Pulses: 2+ and symmetric all extremities. Skin: Skin color, texture, turgor normal. No rashes or lesions   Neurologic: CNII-XII intact. No gross sensory or motor deficits         CONSULTATIONS: Pulmonary/Intensive care    Significant Diagnostic Studies:   1/29/2022: BUN 26 mg/dL (H; Ref range: 6 - 20 mg/dL); Calcium 7.6 mg/dL (L; Ref range: 8.5 - 10.1 mg/dL); CO2 36 mmol/L (H; Ref range: 21 - 32 mmol/L); Creatinine 0.53 mg/dL (L; Ref range: 0.55 - 1.02 mg/dL); Glucose 110 mg/dL (H; Ref range: 65 - 100 mg/dL); HCT 28.3 % (L; Ref range: 35.0 - 47.0 %); HGB 8.9 g/dL (L; Ref range: 11.5 - 16.0 g/dL); Potassium 3.0 mmol/L (L; Ref range: 3.5 - 5.1 mmol/L); Sodium 140 mmol/L (Ref range: 136 - 145 mmol/L)  1/30/2022: BUN 23 mg/dL (H; Ref range: 6 - 20 mg/dL); Calcium 7.3 mg/dL (L; Ref range: 8.5 - 10.1 mg/dL); CO2 36 mmol/L (H; Ref range: 21 - 32 mmol/L); Creatinine 0.49 mg/dL (L; Ref range: 0.55 - 1.02 mg/dL); Glucose 84 mg/dL (Ref range: 65 - 100 mg/dL); HCT 23.4 % (L; Ref range: 35.0 - 47.0 %); HCT 27.5 % (L; Ref range: 35.0 - 47.0 %); HGB 7.4 g/dL (L; Ref range: 11.5 - 16.0 g/dL); HGB 8.7 g/dL (L; Ref range: 11.5 - 16.0 g/dL); Potassium 3.0 mmol/L (L; Ref range: 3.5 - 5.1 mmol/L); Sodium 141 mmol/L (Ref range: 136 - 145 mmol/L)  Recent Labs     02/09/22  1320 02/08/22  0905   WBC 26.9* 7.7   HGB 7.3* 8.4*   HCT 23.4* 26.5*    268     Recent Labs     02/08/22  0905 02/07/22  1605    143   K 3.6 3.4*    106   CO2 30 30   BUN 38* 36*   CREA 0.89 0.85   GLU 59* 89   CA 7.9* 8.0*   MG 2.0 2.0     No results for input(s): ALT, AP, TBIL, TBILI, TP, ALB, GLOB, GGT, AML, LPSE in the last 72 hours. No lab exists for component: SGOT, GPT, AMYP, HLPSE  No results for input(s): INR, PTP, APTT, INREXT, INREXT in the last 72 hours. No results for input(s): FE, TIBC, PSAT, FERR in the last 72 hours. No results for input(s): PH, PCO2, PO2 in the last 72 hours. No results for input(s): CPK, CKMB in the last 72 hours.     No lab exists for component: TROPONINI  No results found for: GLUCPOC    Total Time: 35 minutes    Signed:  Martha Medrano MD  2/10/2022  10:40 AM

## 2022-02-10 NOTE — PROGRESS NOTES
Patient discharged via transport. Discharge instructions gone over with daughter in room. PICC line dc. Site WNL. Belongings with family. Telemetry dc. Unit returned to telemetry and tech notified of discharge.

## 2022-02-10 NOTE — PROGRESS NOTES
Physician Progress Note      Ivy Payne  [facility] #:                  [id number]  :                       1940  ADMIT DATE:       2022 11:38 AM  DISCH DATE:  RESPONDING  PROVIDER #:        Cindy Laws MD          QUERY TEXT:    Patient admitted with pulmonary embolism. Per  Wound RN consult, patient noted to also have Stage 2 PI to coccyx, possibly stage 3 ulcer. If possible, please document in progress notes and discharge summary the type of ulcer, site of the ulcer and present on admission status of the ulcer: The medical record reflects the following:  Risk Factors:79year old female, decreased mobility, incontinence of urine  Clinical Indicators:  RN skin assessment - Patient has moisture associated excoriation to her sacrum and groin.  Wound RN consult - Stage 2 PI to coccyx, possibly stage 3  Unable to completely remove zinc paste from wound to coccyx to adequately visualize wound bed. Stage 2 PI noted with friction/shear injury, possibly stage 3 PI, will follow up to re-evaluate again. Treatment: Wash area gently with soap and water and apply Therahoney gel daily, see dressing order. Avoid applying briefs. Maintain the PureWick to manage  incontinence. Use foam wedge to turn q2h at 30 degree angle or more to offload sacrum. Please email [email or web address] with any questions  Options provided:  -- Decubitus Pressure Ulcer  Stage 2 PI to coccyx, possibly stage 3 present on admission  -- Decubitus Pressure Ulcer Stage 2 PI to coccyx, possibly stage 3 not present on admission  -- Other - I will add my own diagnosis  -- Disagree - Not applicable / Not valid  -- Disagree - Clinically unable to determine / Unknown  -- Refer to Clinical Documentation Reviewer    PROVIDER RESPONSE TEXT:    This patient has a decubitus pressure ulcer Stage 2 PI to coccyx, possibly stage 3 that was present on admission. Query created by:  Jesus Alberto Lane on 2/9/2022 12:00 PM      Electronically signed by:  Arabella Davenport MD 2/10/2022 1:51 PM

## 2022-03-18 PROBLEM — K25.7 CHRONIC GASTRIC ULCER: Status: ACTIVE | Noted: 2022-01-29

## 2022-03-18 PROBLEM — Q44.1 GALLBLADDER ANOMALY: Status: ACTIVE | Noted: 2022-01-29

## 2022-03-18 PROBLEM — I26.99 PULMONARY EMBOLI (HCC): Status: ACTIVE | Noted: 2022-01-29

## 2022-03-18 PROBLEM — J90 PLEURAL EFFUSION: Status: ACTIVE | Noted: 2022-01-29

## 2022-03-19 PROBLEM — K92.2 GIB (GASTROINTESTINAL BLEEDING): Status: ACTIVE | Noted: 2022-01-29

## 2022-03-19 PROBLEM — J96.01 ACUTE RESPIRATORY FAILURE WITH HYPOXIA (HCC): Status: ACTIVE | Noted: 2022-01-29

## 2022-03-19 PROBLEM — K59.00 CONSTIPATION: Status: ACTIVE | Noted: 2022-01-29

## 2022-03-19 PROBLEM — I95.9 HYPOTENSION: Status: ACTIVE | Noted: 2022-01-29

## 2022-03-19 PROBLEM — E87.6 HYPOKALEMIA: Status: ACTIVE | Noted: 2022-01-29

## 2022-03-19 PROBLEM — J90 BILATERAL PLEURAL EFFUSION: Status: ACTIVE | Noted: 2022-01-29

## 2022-03-19 PROBLEM — K44.9 HIATAL HERNIA: Status: ACTIVE | Noted: 2022-01-29

## 2022-03-19 PROBLEM — J85.2 LUNG ABSCESS (HCC): Status: ACTIVE | Noted: 2022-01-29

## 2022-03-19 PROBLEM — U07.1 COVID-19: Status: ACTIVE | Noted: 2022-01-29

## 2022-03-20 PROBLEM — I48.91 ATRIAL FIBRILLATION (HCC): Status: ACTIVE | Noted: 2022-01-29

## 2022-03-20 PROBLEM — D64.9 ANEMIA: Status: ACTIVE | Noted: 2022-01-29

## 2022-03-20 PROBLEM — I82.409 DEEP VEIN THROMBOSIS (DVT) OF LOWER EXTREMITY (HCC): Status: ACTIVE | Noted: 2022-01-29
